# Patient Record
Sex: FEMALE | Race: WHITE | NOT HISPANIC OR LATINO | Employment: OTHER
[De-identification: names, ages, dates, MRNs, and addresses within clinical notes are randomized per-mention and may not be internally consistent; named-entity substitution may affect disease eponyms.]

---

## 2017-06-24 ENCOUNTER — HEALTH MAINTENANCE LETTER (OUTPATIENT)
Age: 57
End: 2017-06-24

## 2018-10-11 ENCOUNTER — OFFICE VISIT (OUTPATIENT)
Dept: FAMILY MEDICINE | Facility: CLINIC | Age: 58
End: 2018-10-11
Payer: COMMERCIAL

## 2018-10-11 ENCOUNTER — RADIANT APPOINTMENT (OUTPATIENT)
Dept: GENERAL RADIOLOGY | Facility: CLINIC | Age: 58
End: 2018-10-11
Attending: INTERNAL MEDICINE
Payer: COMMERCIAL

## 2018-10-11 VITALS
WEIGHT: 227 LBS | HEART RATE: 98 BPM | RESPIRATION RATE: 16 BRPM | OXYGEN SATURATION: 97 % | TEMPERATURE: 98.1 F | DIASTOLIC BLOOD PRESSURE: 96 MMHG | SYSTOLIC BLOOD PRESSURE: 171 MMHG | BODY MASS INDEX: 33.52 KG/M2

## 2018-10-11 DIAGNOSIS — M25.551 HIP PAIN, RIGHT: ICD-10-CM

## 2018-10-11 DIAGNOSIS — K21.9 GASTROESOPHAGEAL REFLUX DISEASE, ESOPHAGITIS PRESENCE NOT SPECIFIED: ICD-10-CM

## 2018-10-11 DIAGNOSIS — M25.551 HIP PAIN, RIGHT: Primary | ICD-10-CM

## 2018-10-11 PROCEDURE — 73502 X-RAY EXAM HIP UNI 2-3 VIEWS: CPT

## 2018-10-11 PROCEDURE — 99214 OFFICE O/P EST MOD 30 MIN: CPT | Performed by: INTERNAL MEDICINE

## 2018-10-11 NOTE — NURSING NOTE
"Chief Complaint   Patient presents with     Hip right     pain for 4-5 days     initial BP (!) 171/96 (BP Location: Right arm, Cuff Size: Adult Large)  Pulse 98  Temp 98.1  F (36.7  C) (Tympanic)  Resp 16  Wt 227 lb (103 kg)  LMP 08/05/2012  SpO2 97%  BMI 33.52 kg/m2 Estimated body mass index is 33.52 kg/(m^2) as calculated from the following:    Height as of 10/13/15: 5' 9\" (1.753 m).    Weight as of this encounter: 227 lb (103 kg).  BP completed using cuff size: large.    R arm      Health Maintenance that is potentially due pending provider review:  NONE    n/a    Clif Caballero ma  "

## 2018-10-11 NOTE — MR AVS SNAPSHOT
After Visit Summary   10/11/2018    Vandana Gonzalez    MRN: [id number]           Patient Information     Date Of Birth          1960        Visit Information        Provider Department      10/11/2018 3:20 PM Korin Abbasi MD [facility]        Today's Diagnoses     Hip pain, right    -  1    Gastroesophageal reflux disease, esophagitis presence not specified           Follow-ups after your visit        Additional Services     ORTHO  REFERRAL       [facility] is referring you to the Orthopedic  Services at [facility].       The  Representative will assist you in the coordination of your Orthopedic and Musculoskeletal Care as prescribed by your physician.    The  Representative will call you within 1 business day to help schedule your appointment, or you may contact the  Representative at:    All areas ~ [phone]     Type of Referral : Surgical / Specialist       Timeframe requested: 1 - 2 days    Coverage of these services is subject to the terms and limitations of your health insurance plan.  Please call member services at your health plan with any benefit or coverage questions.      If X-rays, CT or MRI's have been performed, please contact the facility where they were done to arrange for , prior to your scheduled appointment.  Please bring this referral request to your appointment and present it to your specialist.                  Your next 10 appointments already scheduled     Oct 11, 2018  3:40 PM CDT   XR HIP RIGHT G/E 2 VIEWS with CSXR1   [facility] ([facility])    [address] [address] [phone]           How do I prepare for my exam? (Food and drink instructions) No Food and Drink Restrictions.  How do I prepare for my exam? (Other instructions) You do not need to do anything special for this exam.  What should I wear: Wear  comfortable clothes.  How long does the exam take: Most scans take less than 5 minutes.  What should I bring: Bring a list of your medicines, including vitamins, minerals and over-the-counter drugs. It is safest to leave personal items at home.  Do I need a :  No  is needed.  What do I need to tell my doctor: Tell your doctor if there s any chance you are pregnant.  What should I do after the exam: No restrictions, You may resume normal activities.  What is this test: An image of a specific body part shown in shades of black and white.  Who should I call with questions: If you have any questions, please call the Imaging Department where you will have your exam. Directions, parking instructions, and other information is available on our website, [email or web address].            Oct 12, 2018  9:20 AM CDT   New Visit with Krzysztof Masterson MD   [facility] ORTHOPEDIC SURGERY ([facility])    [address]  [address]  [place] [address]   [phone]              Who to contact     If you have questions or need follow up information about today's clinic visit or your schedule please contact [facility] directly at [phone].  Normal or non-critical lab and imaging results will be communicated to you by MyChart, letter or phone within 4 business days after the clinic has received the results. If you do not hear from us within 7 days, please contact the clinic through [facility] or phone. If you have a critical or abnormal lab result, we will notify you by phone as soon as possible.  Submit refill requests through [facility] or call your pharmacy and they will forward the refill request to us. Please allow 3 business days for your refill to be completed.          Additional Information About Your Visit        [facility] Information     [facility] gives you secure access to your electronic health record. If you see a primary care provider, you can also send messages to  your care team and make appointments. If you have questions, please call your primary care clinic.  If you do not have a primary care provider, please call [phone] and they will assist you.        Care EveryWhere ID     This is your Care EveryWhere ID. This could be used by other organizations to access your [facility] medical records  [id number]        Your Vitals Were     Pulse Temperature Respirations Last Period Pulse Oximetry BMI (Body Mass Index)    98 98.1  F (36.7  C) (Tympanic) 16 08/05/2012 97% 33.52 kg/m2       Blood Pressure from Last 3 Encounters:   10/11/18 (!) 171/96   10/13/15 166/81   07/30/13 138/81    Weight from Last 3 Encounters:   10/11/18 227 lb (103 kg)   10/13/15 225 lb 9.6 oz (102.3 kg)   07/30/13 223 lb 12.8 oz (101.5 kg)              We Performed the Following     ORTHO  REFERRAL          Today's Medication Changes          These changes are accurate as of 10/11/18  3:39 PM.  If you have any questions, ask your nurse or doctor.               These medicines have changed or have updated prescriptions.        Dose/Directions    priLOSEC 20 MG CR capsule   This may have changed:  how much to take   Used for:  Gastroesophageal reflux disease, esophagitis presence not specified   Generic drug:  omeprazole   Changed by:  Korin Abbasi MD        Dose:  20 mg   Take 1 capsule (20 mg) by mouth 2 times daily   Quantity:  30 capsule   Refills:  0                Primary Care Provider Fax #    Physician No Ref-Primary [phone]       No address on file        Equal Access to Services     [facility]: Hadlyndsey webster Soryan, waaxda luqadaha, qaybta kaalmada jose alberto, debbie leon. So [facility] [phone].    ATENCIÓN: Si habla español, tiene a wheatley disposición servicios gratuitos de asistencia lingüística. Llame al [phone].    We comply with applicable federal civil rights laws and [place] laws. We do not discriminate on the basis of race,  color, national origin, age, disability, sex, sexual orientation, or gender identity.            Thank you!     Thank you for choosing [facility]  for your care. Our goal is always to provide you with excellent care. Hearing back from our patients is one way we can continue to improve our services. Please take a few minutes to complete the written survey that you may receive in the mail after your visit with us. Thank you!             Your Updated Medication List - Protect others around you: Learn how to safely use, store and throw away your medicines at www.disposemymeds.org.          This list is accurate as of 10/11/18  3:39 PM.  Always use your most recent med list.                   Brand Name Dispense Instructions for use Diagnosis    aspirin 81 MG tablet     0    1 tab Once per day        priLOSEC 20 MG CR capsule   Generic drug:  omeprazole     30 capsule    Take 1 capsule (20 mg) by mouth 2 times daily    Gastroesophageal reflux disease, esophagitis presence not specified

## 2018-10-11 NOTE — PROGRESS NOTES
SUBJECTIVE:   Vandana Gonzalez is a 58 year old female who presents to clinic today for the following health issues:      Musculoskeletal problem/pain      Duration: 4-5 days    Description  Location: right hip    Intensity:  mild    Accompanying signs and symptoms: none    History  Previous similar problem: no   Previous evaluation:  none    Precipitating or alleviating factors:  Trauma or overuse: no   Aggravating factors include: weight bearing and walking    Therapies tried and outcome: stretching          Current Medications:     Current Outpatient Prescriptions   Medication Sig Dispense Refill     ASPIRIN 81 MG OR TABS 1 tab Once per day 0 0     omeprazole (PRILOSEC) 20 MG CR capsule Take 1 capsule (20 mg) by mouth 2 times daily 30 capsule          Allergies:      Allergies   Allergen Reactions     No Known Allergies             Past Medical History:     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, unspecified site     large angioma adomen     Klippel Trenaunay disease 2002    AVM left leg     Phlebitis and thrombophlebitis of other deep vessels of lower extremities 6-2002    AVM left leg     Stasis ulcer of lower extremity (H) 2012    Left-recurrent         Past Surgical History:     Past Surgical History:   Procedure Laterality Date     BIOPSY OF BREAST, INCISIONAL  7-09    phyllodes tumor left     C LIGATN FEMORAL VEIN      multiple vein strippings left     FEMUR SURGERY      2002 left side      [facility] UGI ENDOSCOPY, SIMPLE EXAM  4-2002    esophageal stricture         Family Medical History:     Family History   Problem Relation Age of Onset     Cancer - colorectal Father          Social History:     Social History     Social History     Marital status:      Spouse name: N/A     Number of children: 0     Years of education: N/A     Occupational History     book-keeping      Social History  Main Topics     Smoking status: Former Smoker     Smokeless tobacco: Never Used      Comment: Quit 1994     Alcohol use 0.0 oz/week     0 Standard drinks or equivalent per week      Comment: couple beers per day     Drug use: No     Sexual activity: Yes     Partners: Female     Other Topics Concern     Not on file     Social History Narrative           Review of System:     Constitutional: Negative for fever or chills  Skin: Negative for rashes  Ears/Nose/Throat: Negative for nasal congestion, sore throat  Respiratory: No shortness of breath, dyspnea on exertion, cough, or hemoptysis  Cardiovascular: Negative for chest pain  Gastrointestinal: Negative for nausea, vomiting  Genitourinary: Negative for dysuria, hematuria  Musculoskeletal: Positive for right hip pains  Neurologic: Negative for headaches  Psychiatric: Negative for depression, anxiety  Hematologic/Lymphatic/Immunologic: Negative  Endocrine: Negative  Behavioral: Negative for tobacco use       Physical Exam:   BP (!) 171/96 (BP Location: Right arm, Cuff Size: Adult Large)  Pulse 98  Temp 98.1  F (36.7  C) (Tympanic)  Resp 16  Wt 227 lb (103 kg)  LMP 08/05/2012  SpO2 97%  BMI 33.52 kg/m2    GENERAL: alert and no distress  EYES: eyes grossly normal to inspection, and conjunctivae and sclerae normal  HENT: Normocephalic atraumatic. Nose and mouth without ulcers or lesions  NECK: supple  RESP: lungs clear to auscultation   CV: regular rate and rhythm, normal S1 S2  LYMPH: no peripheral edema   ABDOMEN: nondistended  MS: right hip pains noted  SKIN: no suspicious lesions or rashes  NEURO: Alert & Oriented x 3.   PSYCH: mentation appears normal, affect normal        Diagnostic Test Results:     Diagnostic Test Results:  Results for orders placed or performed during the hospital encounter of 10/13/15   US Venous Lower Extremity Rt    Narrative    ULTRASOUND RIGHT LOWER EXTREMITY VENOUS DUPLEX  10/13/2015 11:13 AM     HISTORY:  Right leg pain.    FINDINGS:  Duplex ultrasound with waveform spectral analysis and color  flow imaging was performed.    There is normal compressibility, phasicity, augmentation, and patency  of the right common femoral, superficial femoral, popliteal, and  visualized posterior tibial veins, as well as the greater saphenous  vein, with no thrombus seen.    No evidence of popliteal cyst.      Impression    IMPRESSION: No evidence of deep vein thrombosis or greater saphenous  vein thrombosis in the right leg. No evidence of popliteal cyst.    VERITO DODD MD       ASSESSMENT/PLAN:       (M25.551) Hip pain, right  (primary encounter diagnosis)  Comment: poorly controlled chronic right hip pains  Plan: ORTHO  REFERRAL, XR Hip Right 2-3 Views      (K21.9) Esophageal reflux  Comment: patient is due for a refill of Omeprazole medication for GERD treatment  Plan: omeprazole (PRILOSEC) 20 MG CR capsule        Follow Up Plan:     Patient is instructed to return to Internal Medicine clinic for follow-up visit in 1 week.        Korin Abbasi MD  Internal Medicine  [facility]

## 2018-10-12 ENCOUNTER — OFFICE VISIT (OUTPATIENT)
Dept: ORTHOPEDICS | Facility: CLINIC | Age: 58
End: 2018-10-12
Payer: COMMERCIAL

## 2018-10-12 VITALS — WEIGHT: 227 LBS | BODY MASS INDEX: 33.52 KG/M2 | SYSTOLIC BLOOD PRESSURE: 178 MMHG | DIASTOLIC BLOOD PRESSURE: 84 MMHG

## 2018-10-12 DIAGNOSIS — M25.551 PAIN OF RIGHT HIP JOINT: Primary | ICD-10-CM

## 2018-10-12 PROCEDURE — 99243 OFF/OP CNSLTJ NEW/EST LOW 30: CPT | Performed by: ORTHOPAEDIC SURGERY

## 2018-10-12 NOTE — PROGRESS NOTES
HISTORY OF PRESENT ILLNESS:    Vandana Gonzalez is a 58 year old female who is seen in consultation at the request of Dr. Abbasi for right hip pain. Patient reports right hip pain that started 10/7/18. Patient reports no injury or trauma to the hip.  Patient noted increased pain on Monday, 10/8/18 while moving from sit to stand.     Present symptoms: raghavendra lateral hip and thigh pain. Patient notes sharp pain with walking and weight bearing. She denies pain while seated and at rest. Patient denies swelling and bruising of the area.   Patient denies weakness of the right hip. No catching, clicking, and popping of the hip.  Current pain level while walking 8/10.   Treatments tried to this point: Ice pack  Orthopedic PMH: left femoral neck fracture.     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, unspecified site     large angioma adomen     Klippel Trenaunay disease 2002    AVM left leg     Phlebitis and thrombophlebitis of other deep vessels of lower extremities 6-2002    AVM left leg     Stasis ulcer of lower extremity (H) 2012    Left-recurrent       Past Surgical History:   Procedure Laterality Date     BIOPSY OF BREAST, INCISIONAL  7-09    phyllodes tumor left     C LIGATN FEMORAL VEIN      multiple vein strippings left     FEMUR SURGERY      2002 left side      [facility] UGI ENDOSCOPY, SIMPLE EXAM  4-2002    esophageal stricture       Family History   Problem Relation Age of Onset     Cancer - colorectal Father        Social History     Social History     Marital status:      Spouse name: N/A     Number of children: 0     Years of education: N/A     Occupational History     book-keeping      Social History Main Topics     Smoking status: Former Smoker     Smokeless tobacco: Never Used      Comment: Quit 1994     Alcohol use 0.0 oz/week     0 Standard drinks or equivalent per week      Comment: couple  beers per day     Drug use: No     Sexual activity: Yes     Partners: Female     Other Topics Concern     Not on file     Social History Narrative       Current Outpatient Prescriptions   Medication Sig Dispense Refill     ASPIRIN 81 MG OR TABS 1 tab Once per day 0 0     omeprazole (PRILOSEC) 20 MG CR capsule Take 1 capsule (20 mg) by mouth 2 times daily 30 capsule        Allergies   Allergen Reactions     No Known Allergies        REVIEW OF SYSTEMS:  CONSTITUTIONAL:  NEGATIVE for fever, chills, change in weight  INTEGUMENTARY/SKIN:  NEGATIVE for worrisome rashes, moles or lesions  EYES:  NEGATIVE for vision changes or irritation  ENT/MOUTH:  NEGATIVE for ear, mouth and throat problems  RESP:  NEGATIVE for significant cough or SOB  BREAST:  NEGATIVE for masses, tenderness or discharge  CV:  NEGATIVE for chest pain, palpitations or peripheral edema  GI:  NEGATIVE for nausea, abdominal pain, heartburn, or change in bowel habits  :  Negative   MUSCULOSKELETAL:  See HPI above  NEURO:  NEGATIVE for weakness, dizziness or paresthesias  ENDOCRINE:  NEGATIVE for temperature intolerance, skin/hair changes  HEME/ALLERGY/IMMUNE:  NEGATIVE for bleeding problems  PSYCHIATRIC:  NEGATIVE for changes in mood or affect      PHYSICAL EXAM:  [facility] 08/05/2012  There is no height or weight on file to calculate BMI.   GENERAL APPEARANCE: healthy, alert and no distress   SKIN: no suspicious lesions or rashes  NEURO: Normal strength and tone, mentation intact and speech normal  VASCULAR: Good pulses, and capillary refill   LYMPH: no lymphadenopathy   PSYCH:  mentation appears normal and affect normal/bright    MSK:  A&OX3, NAD  Neck supple, no lymphadenopathy  The patient ambulates without an antalgic gait.  The patient is able to get on and off the exam table without difficulty.    Examination of the spine reveals a normal lordosis to the cervical and lumbar spine, and a normal kyphosis to the thoracic spine.  There is no clinical  evidence of scoliosis.    The pelvis is clinically level.  Trendelenberg is negative.  The patient is nontender to palpation over the greater trochanteric bursal region or piriformis fossa.  With the hip flexed to 90 degrees, internal and external rotation is 30/60 respectively,  with no pain.  The calves and thighs are symmetric, without atrophy and non-tender to palpation. Rima's sign is negative, bilaterally.   CMS is intact to the toes.      ASSESSMENT / PLAN: Acute onset of right hip and groin pain.  I ordered an MRI of the hip to look for pathology such as avascular necrosis, or labral pathology.    Imaging Interpretation:     Recent Results (from the past 744 hour(s))   XR Hip Right 2-3 Views    Narrative    XR HIP RIGHT 2-3 VIEWS 10/11/2018 3:46 PM    HISTORY: Pain.    COMPARISON: None.      Impression    IMPRESSION: No evidence of acute fracture or malalignment.     MD Tal RIDER MD  Department of Orthopedic Surgery

## 2018-10-12 NOTE — LETTER
10/12/2018         RE: Vandana Gonzalez  [address] [address]        Dear Colleague,    Thank you for referring your patient, Vandana Gonzalez, to the [facility] ORTHOPEDIC SURGERY. Please see a copy of my visit note below.    HISTORY OF PRESENT ILLNESS:    Vandana Gonzalez is a 58 year old female who is seen in consultation at the request of Dr. Abbasi for right hip pain. Patient reports right hip pain that started 10/7/18. Patient reports no injury or trauma to the hip.  Patient noted increased pain on Monday, 10/8/18 while moving from sit to stand.     Present symptoms: raghavendra lateral hip and thigh pain. Patient notes sharp pain with walking and weight bearing. She denies pain while seated and at rest. Patient denies swelling and bruising of the area.   Patient denies weakness of the right hip. No catching, clicking, and popping of the hip.  Current pain level while walking 8/10.   Treatments tried to this point: Ice pack  Orthopedic PMH: left femoral neck fracture.     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, unspecified site     large angioma adomen     Klippel Trenaunay disease 2002    AVM left leg     Phlebitis and thrombophlebitis of other deep vessels of lower extremities 6-2002    AVM left leg     Stasis ulcer of lower extremity (H) 2012    Left-recurrent       Past Surgical History:   Procedure Laterality Date     BIOPSY OF BREAST, INCISIONAL  7-09    phyllodes tumor left     C LIGATN FEMORAL VEIN      multiple vein strippings left     FEMUR SURGERY      2002 left side      [facility] UGI ENDOSCOPY, SIMPLE EXAM  4-2002    esophageal stricture       Family History   Problem Relation Age of Onset     Cancer - colorectal Father        Social History     Social History     Marital status:      Spouse name: N/A     Number of children: 0     Years of education: N/A      Occupational History     book-keeping      Social History Main Topics     Smoking status: Former Smoker     Smokeless tobacco: Never Used      Comment: Quit 1994     Alcohol use 0.0 oz/week     0 Standard drinks or equivalent per week      Comment: couple beers per day     Drug use: No     Sexual activity: Yes     Partners: Female     Other Topics Concern     Not on file     Social History Narrative       Current Outpatient Prescriptions   Medication Sig Dispense Refill     ASPIRIN 81 MG OR TABS 1 tab Once per day 0 0     omeprazole (PRILOSEC) 20 MG CR capsule Take 1 capsule (20 mg) by mouth 2 times daily 30 capsule        Allergies   Allergen Reactions     No Known Allergies        REVIEW OF SYSTEMS:  CONSTITUTIONAL:  NEGATIVE for fever, chills, change in weight  INTEGUMENTARY/SKIN:  NEGATIVE for worrisome rashes, moles or lesions  EYES:  NEGATIVE for vision changes or irritation  ENT/MOUTH:  NEGATIVE for ear, mouth and throat problems  RESP:  NEGATIVE for significant cough or SOB  BREAST:  NEGATIVE for masses, tenderness or discharge  CV:  NEGATIVE for chest pain, palpitations or peripheral edema  GI:  NEGATIVE for nausea, abdominal pain, heartburn, or change in bowel habits  :  Negative   MUSCULOSKELETAL:  See HPI above  NEURO:  NEGATIVE for weakness, dizziness or paresthesias  ENDOCRINE:  NEGATIVE for temperature intolerance, skin/hair changes  HEME/ALLERGY/IMMUNE:  NEGATIVE for bleeding problems  PSYCHIATRIC:  NEGATIVE for changes in mood or affect      PHYSICAL EXAM:  LMP 08/05/2012  There is no height or weight on file to calculate BMI.   GENERAL APPEARANCE: healthy, alert and no distress   SKIN: no suspicious lesions or rashes  NEURO: Normal strength and tone, mentation intact and speech normal  VASCULAR: Good pulses, and capillary refill   LYMPH: no lymphadenopathy   PSYCH:  mentation appears normal and affect normal/bright    MSK:  A&OX3, NAD  Neck supple, no lymphadenopathy  The patient ambulates  without an antalgic gait.  The patient is able to get on and off the exam table without difficulty.    Examination of the spine reveals a normal lordosis to the cervical and lumbar spine, and a normal kyphosis to the thoracic spine.  There is no clinical evidence of scoliosis.    The pelvis is clinically level.  Trendelenberg is negative.  The patient is nontender to palpation over the greater trochanteric bursal region or piriformis fossa.  With the hip flexed to 90 degrees, internal and external rotation is 30/60 respectively,  with no pain.  The calves and thighs are symmetric, without atrophy and non-tender to palpation. Rima's sign is negative, bilaterally.   CMS is intact to the toes.      ASSESSMENT / PLAN: Acute onset of right hip and groin pain.  I ordered an MRI of the hip to look for pathology such as avascular necrosis, or labral pathology.    Imaging Interpretation:     Recent Results (from the past 744 hour(s))   XR Hip Right 2-3 Views    Narrative    XR HIP RIGHT 2-3 VIEWS 10/11/2018 3:46 PM    HISTORY: Pain.    COMPARISON: None.      Impression    IMPRESSION: No evidence of acute fracture or malalignment.     MD Tal RIDER MD  Department of Orthopedic Surgery          Again, thank you for allowing me to participate in the care of your patient.        Sincerely,        Krzysztof Masterson MD

## 2018-10-12 NOTE — MR AVS SNAPSHOT
After Visit Summary   10/12/2018    Vandana Gonzalez    MRN: [id number]           Patient Information     Date Of Birth          1960        Visit Information        Provider Department      10/12/2018 9:20 AM Krzysztof Masterson MD [facility] ORTHOPEDIC SURGERY        Today's Diagnoses     Pain of right hip joint    -  1      Care Instructions    Please call  [phone] to schedule your appointment if you have not heard from them in two business days  If you need to cancel or change the appointment please call [phone]     Please follow up in clinic 2 business days following the MRI / MRI Arthrogram of right hip.          Follow-ups after your visit        Future tests that were ordered for you today     Open Future Orders        Priority Expected Expires Ordered    MR Hip Right w Contrast Routine  10/12/2019 10/12/2018    XR Gadolinium Injection Routine 10/12/2018 10/12/2019 10/12/2018            Who to contact     If you have questions or need follow up information about today's clinic visit or your schedule please contact [facility] ORTHOPEDIC SURGERY directly at [phone].  Normal or non-critical lab and imaging results will be communicated to you by [employer], letter or phone within 4 business days after the clinic has received the results. If you do not hear from us within 7 days, please contact the clinic through [employer] or phone. If you have a critical or abnormal lab result, we will notify you by phone as soon as possible.  Submit refill requests through [employer] or call your pharmacy and they will forward the refill request to us. Please allow 3 business days for your refill to be completed.          Additional Information About Your Visit        [employer] Information     [employer] gives you secure access to your electronic health record. If you see a primary care provider, you can also send messages to your care team and make appointments. If you have questions, please call  your primary care clinic.  If you do not have a primary care provider, please call [phone] and they will assist you.        Care EveryWhere ID     This is your Care EveryWhere ID. This could be used by other organizations to access your [place] medical records  [id number]        Your Vitals Were     Last Period BMI (Body Mass Index)                08/05/2012 33.52 kg/m2           Blood Pressure from Last 3 Encounters:   10/12/18 178/84   10/11/18 (!) 171/96   10/13/15 166/81    Weight from Last 3 Encounters:   10/12/18 227 lb (103 kg)   10/11/18 227 lb (103 kg)   10/13/15 225 lb 9.6 oz (102.3 kg)               Primary Care Provider Fax #    Physician No Ref-Primary [phone]       No address on file        Equal Access to Services     JC PIERRE : Shola Pemberton, waaxstacey cameron, max olivasaltomasz abrams, debbie neff . So [facility] [phone].    ATENCIÓN: Si habla español, tiene a wheatley disposición servicios gratuitos de asistencia lingüística. Pedrito al [phone].    We comply with applicable federal civil rights laws and [place] laws. We do not discriminate on the basis of race, color, national origin, age, disability, sex, sexual orientation, or gender identity.            Thank you!     Thank you for choosing [facility] ORTHOPEDIC SURGERY  for your care. Our goal is always to provide you with excellent care. Hearing back from our patients is one way we can continue to improve our services. Please take a few minutes to complete the written survey that you may receive in the mail after your visit with us. Thank you!             Your Updated Medication List - Protect others around you: Learn how to safely use, store and throw away your medicines at www.disposemymeds.org.          This list is accurate as of 10/12/18  9:46 AM.  Always use your most recent med list.                   Brand Name Dispense Instructions for use Diagnosis    aspirin 81 MG  tablet     0    1 tab Once per day        priLOSEC 20 MG CR capsule   Generic drug:  omeprazole     30 capsule    Take 1 capsule (20 mg) by mouth 2 times daily    Gastroesophageal reflux disease, esophagitis presence not specified

## 2018-10-12 NOTE — PATIENT INSTRUCTIONS
Please call  [phone] to schedule your appointment if you have not heard from them in two business days  If you need to cancel or change the appointment please call [phone]     Please follow up in clinic 2 business days following the MRI / MRI Arthrogram of right hip.

## 2018-10-22 ENCOUNTER — HOSPITAL ENCOUNTER (OUTPATIENT)
Dept: MRI IMAGING | Facility: CLINIC | Age: 58
End: 2018-10-22
Attending: ORTHOPAEDIC SURGERY
Payer: COMMERCIAL

## 2018-10-22 ENCOUNTER — HOSPITAL ENCOUNTER (OUTPATIENT)
Dept: GENERAL RADIOLOGY | Facility: CLINIC | Age: 58
Discharge: HOME | End: 2018-10-22
Attending: ORTHOPAEDIC SURGERY | Admitting: ORTHOPAEDIC SURGERY
Payer: COMMERCIAL

## 2018-10-22 VITALS — HEART RATE: 86 BPM | DIASTOLIC BLOOD PRESSURE: 94 MMHG | SYSTOLIC BLOOD PRESSURE: 164 MMHG | OXYGEN SATURATION: 100 %

## 2018-10-22 DIAGNOSIS — M25.551 PAIN OF RIGHT HIP JOINT: ICD-10-CM

## 2018-10-22 PROCEDURE — 27211111 XR GADOLINIUM INJECTION

## 2018-10-22 PROCEDURE — 25000125 ZZHC RX 250: Performed by: PHYSICIAN ASSISTANT

## 2018-10-22 PROCEDURE — A9585 GADOBUTROL INJECTION: HCPCS | Performed by: PHYSICIAN ASSISTANT

## 2018-10-22 PROCEDURE — 73722 MRI JOINT OF LWR EXTR W/DYE: CPT | Mod: RT

## 2018-10-22 PROCEDURE — 25000128 H RX IP 250 OP 636: Performed by: PHYSICIAN ASSISTANT

## 2018-10-22 PROCEDURE — 25500064 ZZH RX 255 OP 636: Performed by: PHYSICIAN ASSISTANT

## 2018-10-22 RX ORDER — GADOBUTROL 604.72 MG/ML
0.1 INJECTION INTRAVENOUS ONCE
Status: COMPLETED | OUTPATIENT
Start: 2018-10-22 | End: 2018-10-22

## 2018-10-22 RX ORDER — EPINEPHRINE 1 MG/ML
1 INJECTION, SOLUTION, CONCENTRATE INTRAVENOUS ONCE
Status: COMPLETED | OUTPATIENT
Start: 2018-10-22 | End: 2018-10-22

## 2018-10-22 RX ORDER — IOPAMIDOL 408 MG/ML
10 INJECTION, SOLUTION INTRATHECAL ONCE
Status: COMPLETED | OUTPATIENT
Start: 2018-10-22 | End: 2018-10-22

## 2018-10-22 RX ADMIN — EPINEPHRINE 0.1 MG: 1 INJECTION, SOLUTION, CONCENTRATE INTRAVENOUS at 09:43

## 2018-10-22 RX ADMIN — GADOBUTROL 0.1 ML: 604.72 INJECTION INTRAVENOUS at 09:42

## 2018-10-22 RX ADMIN — IOPAMIDOL 3 ML: 408 INJECTION, SOLUTION INTRATHECAL at 09:43

## 2018-10-22 RX ADMIN — LIDOCAINE HYDROCHLORIDE 3 ML: 10 INJECTION, SOLUTION EPIDURAL; INFILTRATION; INTRACAUDAL; PERINEURAL at 09:41

## 2018-10-22 NOTE — DISCHARGE INSTRUCTIONS
Orthopedic Discharge Instructions:   After Your Injection or Aspiration  ________________________________________    Patient Name:  Vandana Gonzalez  Today's Date:  October 22, 2018  The doctor who performed your INJECTION PROCEDURE was Herve TUBBS in the RADIOLOGY Department  Care of needle site    If you have new numbness down your leg, this may last up to 6 hours, but it should go away. You may need help with walking until your leg feels normal.     Over the next 24 to 48 hours, pain at the needle site may increase before it gets better.     For the next 48 hours, use ice packs for 15 minutes, three to four times a day for pain.    If you have a bandage, you may remove it the next morning.     No tub baths, hot tubs or swimming for 48 hours. You may shower the next day.   Activity    Do not drive until morning.     Limit your activity based on your pain level. Follow your doctor s orders for activity.     You may eat a normal diet.     If you had sedation,   - You may feel sleepy, forgetful or unsteady.   - Do not drink alcohol for 24 hours.  Medicines    If you take aspirin or platelet inhibitors, you can restart them tomorrow.     Restart all other medicines today at your regular dose, including Coumadin (warfarin).    If you are restarting Coumadin, talk to your doctor about having your INR checked.   If you had a steroid shot     The medicine should help reduce swelling and pain. This may take from 7 to 10 days.     Side effects from the shot will be mild and go away in 2 to 3 days. Common side effects may include:  -  Insomnia (trouble sleeping).  -  Heartburn.  -  Flushed face.  -  Water retention (bloating or fluid build-up).  -  Increased appetite (feeling more hungry than usual).  -  Increased blood sugar.  If you have diabetes, watch your blood sugar closely. If needed, call your doctor to help you control your blood sugar.  Some patients will get lasting relief from a single shot. Others may require  up to three shots to get results. If you have more than one steroid shot, they should be given two weeks apart.  Some patients do not have relief of symptoms.    Follow-up:  NO FOLLOW-UP NEEDED   Call your doctor or go to the Emergency Room if you have severe pain, fever or problems with bowel or bladder control.

## 2018-10-22 NOTE — IP AVS SNAPSHOT
[facility] Radiology    [address] [address]    Phone:  [phone]                                       After Visit Summary   10/22/2018    Vandana Gonzalez    MRN: [id number]           After Visit Summary Signature Page     I have received my discharge instructions, and my questions have been answered. I have discussed any challenges I see with this plan with the nurse or doctor.    ..........................................................................................................................................  Patient/Patient Representative Signature      ..........................................................................................................................................  Patient Representative Print Name and Relationship to Patient    ..................................................               ................................................  Date                                   Time    ..........................................................................................................................................  Reviewed by Signature/Title    ...................................................              ..............................................  Date                                               Time          22EPIC Rev 08/18

## 2018-10-22 NOTE — IP AVS SNAPSHOT
MRN:[id number]                      After Visit Summary   10/22/2018    Vandana Gonzalez    MRN: [id number]           Visit Information        Provider Department      10/22/2018  9:00 AM SHXR4 [facility] Radiology           Review of your medicines      UNREVIEWED medicines. Ask your doctor about these medicines        Dose / Directions    aspirin 81 MG tablet        1 tab Once per day   Quantity:  0   Refills:  0       priLOSEC 20 MG CR capsule   Used for:  Gastroesophageal reflux disease, esophagitis presence not specified   Generic drug:  omeprazole        Dose:  20 mg   Take 1 capsule (20 mg) by mouth 2 times daily   Quantity:  30 capsule   Refills:  0                Protect others around you: Learn how to safely use, store and throw away your medicines at www.disposemymeds.org.         Follow-ups after your visit        Your next 10 appointments already scheduled     Oct 22, 2018 10:00 AM CDT   MR HIP RIGHT W CONTRAST with SHMRP2   [facility] MRI ([facility])    [address] [place] [id number] [phone]           How do I prepare for my exam? (Food and drink instructions) **If you will be receiving sedation or general anesthesia, please see special notes below.**  How do I prepare for my exam? (Other instructions) Take your medicines as usual, unless your doctor tells you not to. You may or may not receive intravenous (IV) contrast for this exam pending the discretion of the Radiologist.  You do not need to do anything special to prepare.  **If you will be receiving sedation or general anesthesia, please see special notes below.**  What should I wear: The MRI machine uses a strong magnet. Please wear clothes without metal (snaps, zippers). A sweatsuit works well, or we may give you a hospital gown. Please remove any body piercings and hair extensions before you arrive. You will also remove watches, jewelry, hairpins, wallets, dentures, partial  dental plates and hearing aids. You may wear contact lenses, and you may be able to wear your rings. We have a safe place to keep your personal items, but it is safer to leave them at home.  How long does the exam take: Most tests take 30 to 60 minutes.  HOWEVER, IF YOUR DOCTOR PRESCRIBES ANESTHESIA please plan on spending four to five hours in the recovery room.  What should I bring:  Bring a list of your current medicines to your exam (including vitamins, minerals and over-the-counter drugs).  Do I need a :  **If you will be receiving sedation or general anesthesia, please see special notes below.**  What should I do after the exam: No Restrictions, You may resume normal activities.  What is this test: MRI (magnetic resonance imaging) uses a strong magnet and radio waves to look inside the body. An MRA (magnetic resonance angiogram) does the same thing, but it lets us look at your blood vessels. A computer turns the radio waves into pictures showing cross sections of the body, much like slices of bread. This helps us see any problems more clearly. You may receive fluid (called  contrast ) before or during your scan. The fluid helps us see the pictures better. We give the fluid through an IV (small needle in your arm).  Who should I call with questions:  Please call the Imaging Department at your exam site with any questions. Directions, parking instructions, and other information is available on our website, [email or web address].  How do I prepare if I m having sedation or anesthesia? **IMPORTANT** THE INSTRUCTIONS BELOW ARE ONLY FOR THOSE PATIENTS WHO HAVE BEEN TOLD THEY WILL RECEIVE SEDATION OR GENERAL ANESTHESIA DURING THEIR MRI PROCEDURE:  IF YOU WILL RECEIVE SEDATION (take medicine to help you relax during your exam): You must get the medicine from your doctor before you arrive. Bring the medicine to the exam. Do not take it at home. Arrive one hour early. Bring someone who can take you home after the  test. Your medicine will make you sleepy. After the exam, you may not drive, take a bus or take a taxi by yourself. No eating 8 hours before your exam. You may have clear liquids up until 4 hours before your exam. (Clear liquids include water, clear tea, black coffee and fruit juice without pulp.)  IF YOU WILL RECEIVE ANESTHESIA (be asleep for your exam): Arrive 1 1/2 hours early. Bring someone who can take you home after the test. You may not drive, take a bus or take a taxi by yourself. No eating 8 hours before your exam. You may have clear liquids up until 4 hours before your exam. (Clear liquids include water, clear tea, black coffee and fruit juice without pulp.)               Care Instructions        Further instructions from your care team         Orthopedic Discharge Instructions:   After Your Injection or Aspiration  ________________________________________    Patient Name:  Vandana Gonzalez  Today's Date:  October 22, 2018  The doctor who performed your INJECTION PROCEDURE was Herve TUBBS in the RADIOLOGY Department  Care of needle site    If you have new numbness down your leg, this may last up to 6 hours, but it should go away. You may need help with walking until your leg feels normal.     Over the next 24 to 48 hours, pain at the needle site may increase before it gets better.     For the next 48 hours, use ice packs for 15 minutes, three to four times a day for pain.    If you have a bandage, you may remove it the next morning.     No tub baths, hot tubs or swimming for 48 hours. You may shower the next day.   Activity    Do not drive until morning.     Limit your activity based on your pain level. Follow your doctor s orders for activity.     You may eat a normal diet.     If you had sedation,   - You may feel sleepy, forgetful or unsteady.   - Do not drink alcohol for 24 hours.  Medicines    If you take aspirin or platelet inhibitors, you can restart them tomorrow.     Restart all other medicines  today at your regular dose, including Coumadin (warfarin).    If you are restarting Coumadin, talk to your doctor about having your INR checked.   If you had a steroid shot     The medicine should help reduce swelling and pain. This may take from 7 to 10 days.     Side effects from the shot will be mild and go away in 2 to 3 days. Common side effects may include:  -  Insomnia (trouble sleeping).  -  Heartburn.  -  Flushed face.  -  Water retention (bloating or fluid build-up).  -  Increased appetite (feeling more hungry than usual).  -  Increased blood sugar.  If you have diabetes, watch your blood sugar closely. If needed, call your doctor to help you control your blood sugar.  Some patients will get lasting relief from a single shot. Others may require up to three shots to get results. If you have more than one steroid shot, they should be given two weeks apart.  Some patients do not have relief of symptoms.    Follow-up:  NO FOLLOW-UP NEEDED   Call your doctor or go to the Emergency Room if you have severe pain, fever or problems with bowel or bladder control.      Additional Information About Your Visit        [employer] Information     [employer] gives you secure access to your electronic health record. If you see a primary care provider, you can also send messages to your care team and make appointments. If you have questions, please call your primary care clinic.  If you do not have a primary care provider, please call [phone] and they will assist you.        Care EveryWhere ID     This is your Care EveryWhere ID. This could be used by other organizations to access your [place] medical records  [id number]        Your Vitals Were     Blood Pressure Pulse Last Period Pulse Oximetry          164/94 (BP Location: Right arm) 86 08/05/2012 100%         Primary Care Provider Fax #    Physician No Ref-Primary [phone]      Equal Access to Services     JC PIERRE AH: lai Zelaya  max cameronmastacey barnesblakecoretta ibarragarret clareerick leon. So [facility] [phone].    ATENCIÓN: Si lashanda schwartz, tiene a wheatley disposición servicios gratuitos de asistencia lingüística. Llame al [phone].    We comply with applicable federal civil rights laws and [place] laws. We do not discriminate on the basis of race, color, national origin, age, disability, sex, sexual orientation, or gender identity.            Thank you!     Thank you for choosing [facility] for your care. Our goal is always to provide you with excellent care. Hearing back from our patients is one way we can continue to improve our services. Please take a few minutes to complete the written survey that you may receive in the mail after you visit with us. Thank you!             Medication List: This is a list of all your medications and when to take them. Check marks below indicate your daily home schedule. Keep this list as a reference.      Medications           Morning Afternoon Evening Bedtime As Needed    aspirin 81 MG tablet   1 tab Once per day                                priLOSEC 20 MG CR capsule   Take 1 capsule (20 mg) by mouth 2 times daily   Generic drug:  omeprazole

## 2018-11-02 ENCOUNTER — OFFICE VISIT (OUTPATIENT)
Dept: ORTHOPEDICS | Facility: CLINIC | Age: 58
End: 2018-11-02
Payer: COMMERCIAL

## 2018-11-02 VITALS — SYSTOLIC BLOOD PRESSURE: 160 MMHG | DIASTOLIC BLOOD PRESSURE: 96 MMHG | BODY MASS INDEX: 33.52 KG/M2 | WEIGHT: 227 LBS

## 2018-11-02 DIAGNOSIS — M25.551 PAIN OF RIGHT HIP JOINT: Primary | ICD-10-CM

## 2018-11-02 PROCEDURE — 99213 OFFICE O/P EST LOW 20 MIN: CPT | Performed by: ORTHOPAEDIC SURGERY

## 2018-11-02 NOTE — MR AVS SNAPSHOT
After Visit Summary   11/2/2018    Vandana Gonzalez    MRN: [id number]           Patient Information     Date Of Birth          1960        Visit Information        Provider Department      11/2/2018 9:00 AM Krzysztof Masterson MD [facility] ORTHOPEDIC SURGERY        Today's Diagnoses     Pain of right hip joint    -  1      Care Instructions    Patient to follow up with Primary Care provider regarding elevated blood pressure.            Follow-ups after your visit        Who to contact     If you have questions or need follow up information about today's clinic visit or your schedule please contact [facility] ORTHOPEDIC SURGERY directly at [phone].  Normal or non-critical lab and imaging results will be communicated to you by [employer], letter or phone within 4 business days after the clinic has received the results. If you do not hear from us within 7 days, please contact the clinic through [employer] or phone. If you have a critical or abnormal lab result, we will notify you by phone as soon as possible.  Submit refill requests through [employer] or call your pharmacy and they will forward the refill request to us. Please allow 3 business days for your refill to be completed.          Additional Information About Your Visit        MyChart Information     [employer] gives you secure access to your electronic health record. If you see a primary care provider, you can also send messages to your care team and make appointments. If you have questions, please call your primary care clinic.  If you do not have a primary care provider, please call [phone] and they will assist you.        Care EveryWhere ID     This is your Care EveryWhere ID. This could be used by other organizations to access your [place] medical records  [id number]        Your Vitals Were     Last Period BMI (Body Mass Index)                08/05/2012 33.52 kg/m2           Blood Pressure from Last 3 Encounters:   11/02/18  (!) 160/96   10/22/18 (!) 164/94   10/12/18 178/84    Weight from Last 3 Encounters:   11/02/18 227 lb (103 kg)   10/12/18 227 lb (103 kg)   10/11/18 227 lb (103 kg)              Today, you had the following     No orders found for display       Primary Care Provider Fax #    Physician No Ref-Primary [phone]       No address on file        Equal Access to Services     JC PIERRE : Hadii debbie flores hadasho Soomaali, waaxda luqadaha, qaybta kaalmada adeegyada, debbie pacheco leonardaaminta duncanjamesrakesh neff . So [facility] [phone].    ATENCIÓN: Si habla español, tiene a wheatley disposición servicios gratuitos de asistencia lingüística. Llame al [phone].    We comply with applicable federal civil rights laws and [place] laws. We do not discriminate on the basis of race, color, national origin, age, disability, sex, sexual orientation, or gender identity.            Thank you!     Thank you for choosing [facility] ORTHOPEDIC SURGERY  for your care. Our goal is always to provide you with excellent care. Hearing back from our patients is one way we can continue to improve our services. Please take a few minutes to complete the written survey that you may receive in the mail after your visit with us. Thank you!             Your Updated Medication List - Protect others around you: Learn how to safely use, store and throw away your medicines at www.disposemymeds.org.          This list is accurate as of 11/2/18 11:05 AM.  Always use your most recent med list.                   Brand Name Dispense Instructions for use Diagnosis    aspirin 81 MG tablet     0    1 tab Once per day        priLOSEC 20 MG CR capsule   Generic drug:  omeprazole     30 capsule    Take 1 capsule (20 mg) by mouth 2 times daily    Gastroesophageal reflux disease, esophagitis presence not specified

## 2018-11-02 NOTE — LETTER
11/2/2018         RE: Vandana Gonzalez  [address] [address]        Dear Colleague,    Thank you for referring your patient, Vandana Gonzalez, to the [facility] ORTHOPEDIC SURGERY. Please see a copy of my visit note below.    HISTORY OF PRESENT ILLNESS:    Vandana Gonzalez is a 58 year old female who is seen in follow up for right hip pain.  Present symptoms: raghavendra lateral hip and thigh pain. Patient notes sharp pain with walking and weight bearing. She denies pain while seated and at rest. Patient denies swelling and bruising of the area.   Patient denies weakness of the right hip. No catching, clicking, and popping of the hip.  Current pain level while walking 8/10.   Treatments tried to this point: ice pack    PHYSICAL EXAM:  [facility] 08/05/2012  There is no height or weight on file to calculate BMI.   GENERAL APPEARANCE: healthy, alert and no distress   SKIN: no suspicious lesions or rashes  NEURO: Normal strength and tone, mentation intact and speech normal  VASCULAR:  good pulses, and cappillary refill   LYMPH: no lymphadenopathy   PSYCH:  mentation appears normal and affect normal/bright    MSK:  The patient ambulates without an antalgic gait.  The patient is able to get on and off the exam table without difficulty.        The calves and thighs are symmetric, without atrophy and non-tender to palpation. Rima's sign is negative, bilaterally.   CMS is intact to the toes.       IMAGING INTERPRETATION:   MRI 10/22/18  Impression:     IMPRESSION: Abnormal signal at the base of the anterior superior  labrum consistent with labral tear. No acetabular cyst seen. Articular  surfaces appear intact.         ASSESSMENT / PLAN: Labral tear to the right hip.  I will refer her to 1 of my colleagues for their considerations, with regard to possible hip arthroscopy.      Tal Masterson MD  Dept. Orthopedic Surgery  [facility]         Again, thank you for allowing me to participate in the care of  your patient.        Sincerely,        Krzysztof Masterson MD

## 2018-11-02 NOTE — PROGRESS NOTES
HISTORY OF PRESENT ILLNESS:    Vandana Gonzalez is a 58 year old female who is seen in follow up for right hip pain.  Present symptoms: raghavendra lateral hip and thigh pain. Patient notes sharp pain with walking and weight bearing. She denies pain while seated and at rest. Patient denies swelling and bruising of the area.   Patient denies weakness of the right hip. No catching, clicking, and popping of the hip.  Current pain level while walking 8/10.   Treatments tried to this point: ice pack    PHYSICAL EXAM:  [facility] 08/05/2012  There is no height or weight on file to calculate BMI.   GENERAL APPEARANCE: healthy, alert and no distress   SKIN: no suspicious lesions or rashes  NEURO: Normal strength and tone, mentation intact and speech normal  VASCULAR:  good pulses, and cappillary refill   LYMPH: no lymphadenopathy   PSYCH:  mentation appears normal and affect normal/bright    MSK:  The patient ambulates without an antalgic gait.  The patient is able to get on and off the exam table without difficulty.        The calves and thighs are symmetric, without atrophy and non-tender to palpation. Rima's sign is negative, bilaterally.   CMS is intact to the toes.       IMAGING INTERPRETATION:   MRI 10/22/18  Impression:     IMPRESSION: Abnormal signal at the base of the anterior superior  labrum consistent with labral tear. No acetabular cyst seen. Articular  surfaces appear intact.         ASSESSMENT / PLAN: Labral tear to the right hip.  I will refer her to 1 of my colleagues for their considerations, with regard to possible hip arthroscopy.      Tal Masterson MD  Dept. Orthopedic Surgery  [facility]

## 2019-02-21 ENCOUNTER — TELEPHONE (OUTPATIENT)
Dept: FAMILY MEDICINE | Facility: CLINIC | Age: 59
End: 2019-02-21

## 2019-02-21 NOTE — TELEPHONE ENCOUNTER
Panel Management Review      Patient has the following on her problem list:       Composite cancer screening  Chart review shows that this patient is due/due soon for the following Pap Smear, Mammogram, Colonoscopy and Fecal Colorectal (FIT)  Summary:    Patient is due/failing the following:   COLONOSCOPY, FIT, MAMMOGRAM and PAP    Action needed:   Patient needs referral/order:     Type of outreach:    Phone, left message for patient to call back.     Questions for provider review:    None                                                                                                                                    Eryn Ireland CMA on 2/21/2019 at 12:40 PM

## 2019-12-15 ENCOUNTER — HEALTH MAINTENANCE LETTER (OUTPATIENT)
Age: 59
End: 2019-12-15

## 2020-10-20 ENCOUNTER — HOSPITAL ENCOUNTER (EMERGENCY)
Facility: CLINIC | Age: 60
Discharge: HOME | End: 2020-10-20
Attending: EMERGENCY MEDICINE | Admitting: EMERGENCY MEDICINE
Payer: COMMERCIAL

## 2020-10-20 ENCOUNTER — TELEPHONE (OUTPATIENT)
Dept: FAMILY MEDICINE | Facility: CLINIC | Age: 60
End: 2020-10-20

## 2020-10-20 VITALS
OXYGEN SATURATION: 100 % | BODY MASS INDEX: 32.49 KG/M2 | HEART RATE: 85 BPM | SYSTOLIC BLOOD PRESSURE: 177 MMHG | RESPIRATION RATE: 20 BRPM | WEIGHT: 220 LBS | DIASTOLIC BLOOD PRESSURE: 82 MMHG | TEMPERATURE: 98.4 F

## 2020-10-20 DIAGNOSIS — R13.10 DYSPHAGIA, UNSPECIFIED TYPE: ICD-10-CM

## 2020-10-20 LAB
ANION GAP SERPL CALCULATED.3IONS-SCNC: 5 MMOL/L (ref 3–14)
BUN SERPL-MCNC: 8 MG/DL (ref 7–30)
CALCIUM SERPL-MCNC: 8.9 MG/DL (ref 8.5–10.1)
CHLORIDE SERPL-SCNC: 109 MMOL/L (ref 94–109)
CO2 SERPL-SCNC: 25 MMOL/L (ref 20–32)
CREAT SERPL-MCNC: 0.45 MG/DL (ref 0.52–1.04)
GFR SERPL CREATININE-BSD FRML MDRD: >90 ML/MIN/{1.73_M2}
GLUCOSE SERPL-MCNC: 105 MG/DL (ref 70–99)
POTASSIUM SERPL-SCNC: 3.7 MMOL/L (ref 3.4–5.3)
SODIUM SERPL-SCNC: 139 MMOL/L (ref 133–144)

## 2020-10-20 PROCEDURE — 99283 EMERGENCY DEPT VISIT LOW MDM: CPT | Mod: 25

## 2020-10-20 PROCEDURE — 96360 HYDRATION IV INFUSION INIT: CPT

## 2020-10-20 PROCEDURE — 258N000003 HC RX IP 258 OP 636: Performed by: EMERGENCY MEDICINE

## 2020-10-20 PROCEDURE — 80048 BASIC METABOLIC PNL TOTAL CA: CPT | Performed by: EMERGENCY MEDICINE

## 2020-10-20 RX ADMIN — SODIUM CHLORIDE 1000 ML: 9 INJECTION, SOLUTION INTRAVENOUS at 16:45

## 2020-10-20 ASSESSMENT — ENCOUNTER SYMPTOMS
FEVER: 0
CONSTIPATION: 0
VOMITING: 0
NAUSEA: 0
TROUBLE SWALLOWING: 1
DIARRHEA: 0
COUGH: 1

## 2020-10-20 NOTE — DISCHARGE INSTRUCTIONS
Nothing to eat or drink after midnight.  [address] [place], at 8 AM for endoscopy.  You will need a ride home after sedation.  Return to the ER if you have new concerns or changes overnight.

## 2020-10-20 NOTE — TELEPHONE ENCOUNTER
Reason for call:  Patient reporting a symptom    Symptom or request: Difficulty swallowing She cant get fluids down    Duration (how long have symptoms been present): For a few days she has been having trouble swallowing food and she just ate slow but this morning she cant get fluids down   She has had this before about 18 years ago and it was GERD    No opening until this afternoon and I Checked [facility] and [facility]    Have you been treated for this before? No    Additional comments: Patient has insurance that states this is her Primary care clinic    Phone Number patient can be reached at:  Cell number on file:    Telephone Information:   Mobile [phone]       Best Time:  anytime    Can we leave a detailed message on this number:  YES    Call taken on 10/20/2020 at 9:15 AM by Tonya Mukherjee

## 2020-10-20 NOTE — ED PROVIDER NOTES
"  History     Chief Complaint:  Dysphagia    The history is provided by the patient.      Vandana Gonzalez is a 60 year old female with a history of GERD who presents with dysphagia. Vandana has not seen gastroenterology in 9 years since an endoscopy which revealed esophagitis and severe stenosis for which she was started on omeprazole. For the past 6 months she has had increased difficulty swallowing solid foods. She has been able to eat by taking small bites and eating a diet mostly of soft foods like soup and yogurt. This morning, she noticed difficulty swallowing her morning coffee which seemed to be getting stuck and \"coming right back up\". She has an associated full sensation in the center of her chest but no significant pain. She has been able to swallow her oral secretions without difficulty. She endorses months of a \"phlegmy\" cough but otherwise denies nausea, vomiting, fever, or irregular bowel movements.     Allergies:  The patient has no known drug allergies.     Medications:    Prilosec      Past Medical History:    Anxiety   IBS   Hypertension   Klippel Tranaunay disease     Past Surgical History:    Phyllodes tumor left breast   Multiple vein stripping-left   Femur surgery-left   UGI endoscopy -2002     Family History:    Colorectal cancer     Social History:  Presents to the ED with: her    Tobacco use: Former smoker  Alcohol use: 2 drinks/night  Drug use: No  Marital Status:      Review of Systems   Constitutional: Negative for fever and unexpected weight change.   HENT: Positive for trouble swallowing.    Respiratory: Positive for cough.    Cardiovascular: Negative for chest pain.   Gastrointestinal: Negative for blood in stool, constipation, diarrhea, nausea and vomiting.   All other systems reviewed and are negative.    Physical Exam     Patient Vitals for the past 24 hrs:   BP Temp Temp src Pulse Resp SpO2 Weight   10/20/20 1729 (!) 177/82 -- -- 85 -- 100 % --   10/20/20 1357 (!) 179/87 " 98.4  F (36.9  C) Oral 107 20 100 % 99.8 kg (220 lb)       Physical Exam  General: Well-developed and well-nourished. Well appearing middle aged  woman. Cooperative.  Head:  Atraumatic.  Eyes:  Conjunctivae, lids, and sclerae are normal.  Neck:  Supple. Normal range of motion.  CV:  Regular rate and rhythm. Normal heart sounds with no murmurs, rubs, or gallops detected.  Resp:  No respiratory distress. Clear to auscultation bilaterally without decreased breath sounds, wheezing, rales, or rhonchi.  GI:  Non-distended.    MS:  Normal ROM.  Skin:  Warm. Non-diaphoretic. No pallor.  Neuro:  Awake. A&Ox3. Normal strength.  Psych: Normal mood and affect. Normal speech.  Vitals reviewed.  Emergency Department Course   Laboratory:  BMP: Glucose 105 (H), Creatinine: 0.45 (L), o/w WNL     Interventions:  1645 NS 1L IV     Emergency Department Course:  Nursing notes and vitals reviewed. (1620) I performed an exam of the patient as documented above. She tolerated water.    IV inserted. Medicine administered as documented above. Blood drawn. This was sent to the lab for further testing, results above.    (1631) I consulted with Dr. Chavez, gastroenterology, regarding the patient's history and presentation here in the emergency department. He recommends giving her fluids and he will see her tomorrow in clinic for an endoscopy.     (1732) I rechecked the patient and discussed the results of her workup thus far.     Findings and plan explained to the patient. Patient discharged home with instructions regarding supportive care, medications, and reasons to return. The importance of close follow-up was reviewed.     I personally reviewed the laboratory results with the patient and answered all related questions prior to discharge.     Impression & Plan      Medical Decision Making:  Vandana is a 60-year-old female who had endoscopy in 2011 that did show severe stricture of the esophagus.  She is on omeprazole but has had no  further endoscopies or GI follow-up since that time.  Over the last several months, she has had difficulty swallowing solids but has been able to stay nourished with soft foods and liquids.  This morning she became concerned when she had difficulty swallowing liquids as well.  She denies all other concerns or complaints and appears quite well on exam.  I discussed her case with Dr. Chavez GI, who agrees she is appropriate for discharge as Vandana was able to tolerate water here for me without vomiting.  She remarks it is improved since this morning but worse than her recent baseline.  Dr. Chavez was able to arrange endoscopy tomorrow morning at 8 a.m. and I updated Vandana on the need to remain NPO after midnight and to have a  after her sedation.  She was given the address and phone number for Dr. Chavez's office.  She was given IV fluids here to make sure she was well hydrated. BMP is unremarkable without significant electrolyte derangements or kidney injury.  Vandana is comfortable with the aforementioned plan and has no further concerns.  I answered all her questions and provided return precautions.    Of note, as she is moderately hypertensive here to 170s systolic.  She does not follow with primary care and does not take antihypertensives.  I encouraged her to establish care and discuss health management as I suspect she will need antihypertensives.    Diagnosis:    ICD-10-CM    1. Dysphagia, unspecified type  R13.10        Disposition:  discharged home    Scribe Disclosure:  I, Guerita Garrett, am serving as a scribe on 10/20/2020 at 4:20 PM to personally document services performed by Lucretia Acevedo MD based on my observations and the provider's statements to me.     Guerita Garrett  10/20/2020   [facility] EMERGENCY DEPT         Lucretia Acevedo MD  10/22/20 0155

## 2020-10-20 NOTE — ED AVS SNAPSHOT
[facility] Emergency Dept  [address] [address]  Phone: [phone]  Fax: [phone]                                    Vandana Gonzalez   MRN: [id number]    Department: [facility] Emergency Dept   Date of Visit: 10/20/2020           After Visit Summary Signature Page    I have received my discharge instructions, and my questions have been answered. I have discussed any challenges I see with this plan with the nurse or doctor.    ..........................................................................................................................................  Patient/Patient Representative Signature      ..........................................................................................................................................  Patient Representative Print Name and Relationship to Patient    ..................................................               ................................................  Date                                   Time    ..........................................................................................................................................  Reviewed by Signature/Title    ...................................................              ..............................................  Date                                               Time          22EPIC Rev 08/18

## 2020-10-21 ENCOUNTER — TRANSFERRED RECORDS (OUTPATIENT)
Dept: HEALTH INFORMATION MANAGEMENT | Facility: CLINIC | Age: 60
End: 2020-10-21
Payer: COMMERCIAL

## 2020-10-22 ASSESSMENT — ENCOUNTER SYMPTOMS
UNEXPECTED WEIGHT CHANGE: 0
BLOOD IN STOOL: 0

## 2020-11-20 ENCOUNTER — TRANSFERRED RECORDS (OUTPATIENT)
Dept: HEALTH INFORMATION MANAGEMENT | Facility: CLINIC | Age: 60
End: 2020-11-20
Payer: COMMERCIAL

## 2020-11-25 ENCOUNTER — TRANSFERRED RECORDS (OUTPATIENT)
Dept: HEALTH INFORMATION MANAGEMENT | Facility: CLINIC | Age: 60
End: 2020-11-25
Payer: COMMERCIAL

## 2021-01-15 ENCOUNTER — TRANSFERRED RECORDS (OUTPATIENT)
Dept: HEALTH INFORMATION MANAGEMENT | Facility: CLINIC | Age: 61
End: 2021-01-15

## 2021-01-15 ENCOUNTER — HEALTH MAINTENANCE LETTER (OUTPATIENT)
Age: 61
End: 2021-01-15

## 2021-02-12 ENCOUNTER — TRANSFERRED RECORDS (OUTPATIENT)
Dept: HEALTH INFORMATION MANAGEMENT | Facility: CLINIC | Age: 61
End: 2021-02-12

## 2021-02-21 ENCOUNTER — TRANSFERRED RECORDS (OUTPATIENT)
Dept: HEALTH INFORMATION MANAGEMENT | Facility: CLINIC | Age: 61
End: 2021-02-21
Payer: COMMERCIAL

## 2021-04-23 ENCOUNTER — TRANSFERRED RECORDS (OUTPATIENT)
Dept: HEALTH INFORMATION MANAGEMENT | Facility: CLINIC | Age: 61
End: 2021-04-23

## 2021-05-21 ENCOUNTER — TRANSFERRED RECORDS (OUTPATIENT)
Dept: HEALTH INFORMATION MANAGEMENT | Facility: CLINIC | Age: 61
End: 2021-05-21

## 2021-06-14 ENCOUNTER — TRANSFERRED RECORDS (OUTPATIENT)
Dept: HEALTH INFORMATION MANAGEMENT | Facility: CLINIC | Age: 61
End: 2021-06-14

## 2021-07-02 ENCOUNTER — OFFICE VISIT (OUTPATIENT)
Dept: FAMILY MEDICINE | Facility: CLINIC | Age: 61
End: 2021-07-02
Payer: COMMERCIAL

## 2021-07-02 VITALS
BODY MASS INDEX: 30.21 KG/M2 | TEMPERATURE: 98.4 F | DIASTOLIC BLOOD PRESSURE: 77 MMHG | WEIGHT: 204 LBS | OXYGEN SATURATION: 100 % | HEART RATE: 99 BPM | HEIGHT: 69 IN | SYSTOLIC BLOOD PRESSURE: 159 MMHG

## 2021-07-02 DIAGNOSIS — H02.403 PTOSIS OF BOTH EYELIDS: ICD-10-CM

## 2021-07-02 DIAGNOSIS — K21.00 GASTROESOPHAGEAL REFLUX DISEASE WITH ESOPHAGITIS WITHOUT HEMORRHAGE: ICD-10-CM

## 2021-07-02 DIAGNOSIS — I10 HYPERTENSION, UNSPECIFIED TYPE: Primary | ICD-10-CM

## 2021-07-02 DIAGNOSIS — Z01.818 PREOP GENERAL PHYSICAL EXAM: ICD-10-CM

## 2021-07-02 PROCEDURE — 99214 OFFICE O/P EST MOD 30 MIN: CPT | Performed by: PHYSICIAN ASSISTANT

## 2021-07-02 PROCEDURE — 36415 COLL VENOUS BLD VENIPUNCTURE: CPT | Performed by: PHYSICIAN ASSISTANT

## 2021-07-02 PROCEDURE — 80048 BASIC METABOLIC PNL TOTAL CA: CPT | Performed by: PHYSICIAN ASSISTANT

## 2021-07-02 PROCEDURE — 93000 ELECTROCARDIOGRAM COMPLETE: CPT | Performed by: PHYSICIAN ASSISTANT

## 2021-07-02 RX ORDER — PANTOPRAZOLE SODIUM 40 MG/1
40 TABLET, DELAYED RELEASE ORAL 2 TIMES DAILY
Status: ON HOLD | COMMUNITY
Start: 2021-06-07 | End: 2021-10-08

## 2021-07-02 RX ORDER — SUCRALFATE 1 G/1
TABLET ORAL
COMMUNITY
Start: 2021-06-06 | End: 2021-10-06

## 2021-07-02 RX ORDER — AMLODIPINE BESYLATE 2.5 MG/1
2.5 TABLET ORAL DAILY
Qty: 60 TABLET | Refills: 0 | Status: SHIPPED | OUTPATIENT
Start: 2021-07-02 | End: 2021-10-06

## 2021-07-02 ASSESSMENT — MIFFLIN-ST. JEOR: SCORE: 1559.72

## 2021-07-02 NOTE — PROGRESS NOTES
[facility]  [address] [place] [address], [address]  [facility] [address]  Phone: [phone]  Primary Provider: No Ref-Primary, Physician  {FV AMB Performing Provider (Optional):540197}    {Provider  Link to PREOP SmartSet  Use this to apply standard patient instructions to AVS; includes medication directions, common orders, guidelines for anemia, warfarin, additional testing   :870655}  PREOPERATIVE EVALUATION:  Today's date: 7/2/2021    Vandana Gonzalez is a 60 year old female who presents for a preoperative evaluation.    Surgical Information:  Surgery/Procedure: ***  Surgery Location: ***  Surgeon: ***  Surgery Date: ***  Time of Surgery: ***  Where patient plans to recover: {Preop post recovery plans :000224}  Fax number for surgical facility: {SURGERY FAX NUMBER:[id number]}    Type of Anesthesia Anticipated: {ANESTHESIA:366488}    {2021 Provider Charting Preference for Preop :087779}    Subjective     HPI related to upcoming procedure: ***    Preop Questions 6/28/2021   1. Have you ever had a heart attack or stroke? No   2. Have you ever had surgery on your heart or blood vessels, such as a stent placement, a coronary artery bypass, or surgery on an artery in your head, neck, heart, or legs? No   3. Do you have chest pain with activity? No   4. Do you have a history of  heart failure? No   5. Do you currently have a cold, bronchitis or symptoms of other infection? No   6. Do you have a cough, shortness of breath, or wheezing? No   7. Do you or anyone in your family have previous history of blood clots? YES - ***   8. Do you or does anyone in your family have a serious bleeding problem such as prolonged bleeding following surgeries or cuts? No   9. Have you ever had problems with anemia or been told to take iron pills? No   10. Have you had any abnormal blood loss such as black, tarry or bloody stools, or abnormal vaginal bleeding? No   11. Have you ever had a blood transfusion? No   12. Are you willing  to have a blood transfusion if it is medically needed before, during, or after your surgery? Yes   13. Have you or any of your relatives ever had problems with anesthesia? No   14. Do you have sleep apnea, excessive snoring or daytime drowsiness? No   15. Do you have any artifical heart valves or other implanted medical devices like a pacemaker, defibrillator, or continuous glucose monitor? No   16. Do you have artificial joints? No   17. Are you allergic to latex? No   18. Is there any chance that you may be pregnant? No       Health Care Directive:  Patient does not have a Health Care Directive or Living Will: {ADVANCE_DIRECTIVE_STATUS:519351}    Preoperative Review of :  {Mnpmpreport:276334}  {Review MNPMP for all patients per [employer]CSI MNPMP Profile:044651}    {Chronic problem details (Optional) :165997}    Review of Systems  {ROS Preop Choices:051583}    Patient Active Problem List    Diagnosis Date Noted     CARDIOVASCULAR SCREENING; LDL GOAL LESS THAN 160 10/31/2010     Priority: Medium     Hypertensive disorder 10/29/2009     Priority: Medium     (Problem list name updated by automated process. Provider to review and confirm.)       NUMBNESS LEFT EXTREMITIES 06/26/2006     Priority: Medium     Anxiety state 06/26/2006     Priority: Medium     Problem list name updated by automated process. 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       Allergies   Allergen Reactions     No Known Allergies         Social History     Tobacco Use     Smoking status: Former Smoker     Smokeless tobacco: Never Used     Tobacco comment: Quit    Substance Use Topics     Alcohol use: Yes     Alcohol/week: 0.0 standard drinks     Comment: couple beers per day     {FAMILY HISTORY (Optional):038105529}  History   Drug Use No         Objective     LMP 2012     Physical Exam  {EXAM Preop Choices:288718}    Recent Labs   Lab Test 10/20/20  1646      POTASSIUM 3.7   CR 0.45*        Diagnostics:  {LABS:572989}   {EK}    Revised Cardiac Risk Index (RCRI):  The patient has the following serious cardiovascular risks for perioperative complications:  {PREOP REVISED CARDIAC RISK INDEX (RCRI) :290553::\" - No serious cardiac risks = 0 points\"}     RCRI Interpretation: {REVISED CARDIAC RISK INTERPRETATION :594074}         Signed Electronically by: Aye Wong PA-C  Copy of this evaluation report is provided to requesting physician.    {Provider Resources  Preop [facility] Preop Guidelines  Revised Cardiac Risk Index :078473}  "

## 2021-07-02 NOTE — PATIENT INSTRUCTIONS
Take protonix w/a sip of water the morning of surgery    Take carafate after your surgery    Don't take any NSAIDs (ibuprofen, aspirin or naproxen) one week prior to surgery    Honoring Choices (Health Care Directive) Phone:  [phone]  Hours 6a to 5p Monday-Friday  & Limited hours on weekends  Please leave a message and we will return your call as soon as possible.    Please start amlodipine 2.5mg daily and check blood pressure daily and record, bring to next wellness visit

## 2021-07-02 NOTE — PROGRESS NOTES
[address], [address]  [facility] [address]  Phone: [phone]  Primary Provider: No Ref-Primary, Physician  Pre-op Performing Provider: ANGELO KAHN      PREOPERATIVE EVALUATION:  Today's date: 7/2/2021    Vandana Gonzalez is a 60 year old female who presents for a preoperative evaluation.    Surgical Information:  Surgery/Procedure: bilateral eyelid lift   Surgery Location: [facility] [place]   Surgeon: Dr. Jeannine Miramontes  Surgery Date: 07/16/2021  Time of Surgery: AM  Where patient plans to recover: At home with family  Fax number for surgical facility: [phone]    Type of Anesthesia Anticipated: Local with MAC    Assessment & Plan     The proposed surgical procedure is considered LOW risk.  Assessment and Plan:     (I10) Hypertension, unspecified type  (primary encounter diagnosis)  Comment: has had elevated pressures on last 8 visits  Plan: amLODIPine (NORVASC) 2.5 MG tablet        Will start w/above amlodipine dose and she will record pressures at home, may need to increase if not controlled at 2.5mg, will get BMP today, she needs to establish w/a pcp and will do so after surgery    (K21.00) Gastroesophageal reflux disease with esophagitis without hemorrhage  Comment: well controlled with ppi and carafate  Plan: take protonix am of surgery, carafate is large pill and takes with cup of water so will hold until after procedure    (H02.403) Ptosis of both eyelids  Comment: ongoing x years, bothers her most at night  Plan: surgery per above    (Z01.818) Preop general physical exam  Comment: low risk surgery, low risk patient, able to do at least 4 METS w/out difficulty  Plan: Basic metabolic panel  (Ca, Cl, CO2, Creat,         Gluc, K, Na, BUN), EKG 12-lead complete w/read         - Clinics            Risks and Recommendations:  The patient has the following additional risks and recommendations for perioperative complications:   - No  identified additional risk factors other than previously addressed    Medication Instructions:  Take protonix w/a sip of water the morning of surgery    Take carafate after your surgery    RECOMMENDATION:  APPROVAL GIVEN to proceed with proposed procedure, without further diagnostic evaluation.    40 minutes on the day of the encounter doing chart review, history and exam, documentation and further activities as noted above.      Aye Wong PA-C        Subjective     HPI related to upcoming procedure:     Vandana will have surgery for correction of bilateral ptosis. The ptosis bothers her during the day and worsens in the evening.    She feels well otherwise.  She hasn't been on blood pressure pills in the past. She is able to walk up a flight of stairs w/out chest pain or significant dyspnea.    She denies dizziness, headache, chest pain, shortness of breath, cough, abdominal pain, nausea/vomiting, diarrhea, urinary issues, new leg swelling (lymphedema left leg is chronic and unchanged).    Preop Questions 6/28/2021   1. Have you ever had a heart attack or stroke? No   2. Have you ever had surgery on your heart or blood vessels, such as a stent placement, a coronary artery bypass, or surgery on an artery in your head, neck, heart, or legs? No   3. Do you have chest pain with activity? No   4. Do you have a history of  heart failure? No   5. Do you currently have a cold, bronchitis or symptoms of other infection? No   6. Do you have a cough, shortness of breath, or wheezing? No   7. Do you or anyone in your family have previous history of blood clots? YES - blood clot 2002 left leg, after prolonged flight   8. Do you or does anyone in your family have a serious bleeding problem such as prolonged bleeding following surgeries or cuts? No   9. Have you ever had problems with anemia or been told to take iron pills? No   10. Have you had any abnormal blood loss such as black, tarry or bloody stools, or abnormal  vaginal bleeding? No   11. Have you ever had a blood transfusion? No   12. Are you willing to have a blood transfusion if it is medically needed before, during, or after your surgery? Yes   13. Have you or any of your relatives ever had problems with anesthesia? No   14. Do you have sleep apnea, excessive snoring or daytime drowsiness? No   15. Do you have any artifical heart valves or other implanted medical devices like a pacemaker, defibrillator, or continuous glucose monitor? No   16. Do you have artificial joints? No   17. Are you allergic to latex? No   18. Is there any chance that you may be pregnant? No       Health Care Directive:  Patient does not have a Health Care Directive or Living Will: Discussed advance care planning with patient; however, patient declined at this time.      Review of Systems  Constitutional, neuro, ENT, endocrine, pulmonary, cardiac, gastrointestinal, genitourinary, musculoskeletal, integument and psychiatric systems are negative, except as otherwise noted.    Patient Active Problem List    Diagnosis Date Noted     CARDIOVASCULAR SCREENING; LDL GOAL LESS THAN 160 10/31/2010     Priority: Medium     Hypertensive disorder 10/29/2009     Priority: Medium     (Problem list name updated by automated process. Provider to review and confirm.)       NUMBNESS LEFT EXTREMITIES 06/26/2006     Priority: Medium     Anxiety state 06/26/2006     Priority: Medium     Problem list name updated by automated process. 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, unspecified site     large  angioma adomen     Klippel Trenaunay disease 2002    AVM left leg     Phlebitis and thrombophlebitis of other deep vessels of lower extremities 6-2002    AVM left leg     Stasis ulcer of lower extremity (H) 2012    Left-recurrent     Past Surgical History:   Procedure Laterality Date     BIOPSY OF BREAST, INCISIONAL  7-09    phyllodes tumor left     C LIGATN FEMORAL VEIN      multiple vein strippings left     FEMUR SURGERY      2002 left side      HC UGI ENDOSCOPY, SIMPLE EXAM  4-2002    esophageal stricture     No current outpatient medications on file.       Allergies   Allergen Reactions     No Known Allergies         Social History     Tobacco Use     Smoking status: Former Smoker     Smokeless tobacco: Never Used     Tobacco comment: Quit 1994   Substance Use Topics     Alcohol use: Yes     Alcohol/week: 0.0 standard drinks     Comment: couple beers per day     Family History   Problem Relation Age of Onset     Cancer - colorectal Father      History   Drug Use No         Objective     LMP 08/05/2012     Physical Exam    GENERAL APPEARANCE: healthy, alert and no distress     EYES: no scleral icterus     HENT: OP clear, mouth without ulcers or lesions     NECK: supple, no bruits     RESP: lungs clear to auscultation - no rales, rhonchi or wheezes     CV: regular rates and rhythm, normal S1 S2, no S3 or S4 and no murmur, click or rub     ABDOMEN:  soft, nontender, no HSM or masses and bowel sounds normal     MS: extremities normal-LLE with lymphedema     NEURO: Normal mentation intact and speech normal     PSYCH: mentation appears normal. and affect normal/bright    Diagnostics:  Labs pending at this time.  BMP results will be reviewed when available.   EKG--NSR, rate 85, no ischemia    Revised Cardiac Risk Index (RCRI):  The patient has the following serious cardiovascular risks for perioperative complications:   - No serious cardiac risks = 0 points     RCRI Interpretation: 0 points: Class I (very low risk -  0.4% complication rate)        Signed Electronically by: Aye Wong PA-C  Copy of this evaluation report is provided to requesting physician.

## 2021-07-03 LAB
ANION GAP SERPL CALCULATED.3IONS-SCNC: 7 MMOL/L (ref 3–14)
BUN SERPL-MCNC: 7 MG/DL (ref 7–30)
CALCIUM SERPL-MCNC: 8.3 MG/DL (ref 8.5–10.1)
CHLORIDE SERPL-SCNC: 107 MMOL/L (ref 94–109)
CO2 SERPL-SCNC: 23 MMOL/L (ref 20–32)
CREAT SERPL-MCNC: 0.55 MG/DL (ref 0.52–1.04)
GFR SERPL CREATININE-BSD FRML MDRD: >90 ML/MIN/{1.73_M2}
GLUCOSE SERPL-MCNC: 97 MG/DL (ref 70–99)
POTASSIUM SERPL-SCNC: 3.7 MMOL/L (ref 3.4–5.3)
SODIUM SERPL-SCNC: 137 MMOL/L (ref 133–144)

## 2021-07-05 NOTE — RESULT ENCOUNTER NOTE
Dear Vandana,     Here are your recent results which are within the expected range. Please continue with your current plan of care and let us know if you have any questions or concerns.    Regards,  Aye Wong PA-C

## 2021-08-06 ENCOUNTER — TRANSFERRED RECORDS (OUTPATIENT)
Dept: HEALTH INFORMATION MANAGEMENT | Facility: CLINIC | Age: 61
End: 2021-08-06

## 2021-09-04 ENCOUNTER — HEALTH MAINTENANCE LETTER (OUTPATIENT)
Age: 61
End: 2021-09-04

## 2021-09-27 ENCOUNTER — TRANSFERRED RECORDS (OUTPATIENT)
Dept: HEALTH INFORMATION MANAGEMENT | Facility: CLINIC | Age: 61
End: 2021-09-27

## 2021-10-06 ENCOUNTER — TELEPHONE (OUTPATIENT)
Dept: FAMILY MEDICINE | Facility: CLINIC | Age: 61
End: 2021-10-06

## 2021-10-06 ENCOUNTER — HOSPITAL ENCOUNTER (OUTPATIENT)
Facility: CLINIC | Age: 61
Setting detail: OBSERVATION
Discharge: HOME | End: 2021-10-08
Attending: EMERGENCY MEDICINE | Admitting: INTERNAL MEDICINE
Payer: COMMERCIAL

## 2021-10-06 ENCOUNTER — OFFICE VISIT (OUTPATIENT)
Dept: FAMILY MEDICINE | Facility: CLINIC | Age: 61
End: 2021-10-06
Payer: COMMERCIAL

## 2021-10-06 VITALS
SYSTOLIC BLOOD PRESSURE: 167 MMHG | OXYGEN SATURATION: 100 % | BODY MASS INDEX: 29.53 KG/M2 | DIASTOLIC BLOOD PRESSURE: 75 MMHG | WEIGHT: 200 LBS | TEMPERATURE: 100 F | HEART RATE: 94 BPM | RESPIRATION RATE: 12 BRPM

## 2021-10-06 DIAGNOSIS — Q87.2 KLIPPEL TRENAUNAY SYNDROME: ICD-10-CM

## 2021-10-06 DIAGNOSIS — I10 PRIMARY HYPERTENSION: ICD-10-CM

## 2021-10-06 DIAGNOSIS — Z01.818 PREOPERATIVE EXAMINATION: Primary | ICD-10-CM

## 2021-10-06 DIAGNOSIS — H02.413 MECHANICAL PTOSIS OF BILATERAL EYELIDS: ICD-10-CM

## 2021-10-06 DIAGNOSIS — D64.9 ANEMIA, UNSPECIFIED TYPE: ICD-10-CM

## 2021-10-06 LAB
ABO/RH(D): NORMAL
ANION GAP SERPL CALCULATED.3IONS-SCNC: 8 MMOL/L (ref 3–14)
ANTIBODY SCREEN: NEGATIVE
BASOPHILS # BLD AUTO: 0 10E3/UL (ref 0–0.2)
BASOPHILS NFR BLD AUTO: 1 %
BLD PROD TYP BPU: NORMAL
BLD PROD TYP BPU: NORMAL
BLOOD COMPONENT TYPE: NORMAL
BLOOD COMPONENT TYPE: NORMAL
BUN SERPL-MCNC: 11 MG/DL (ref 7–30)
CALCIUM SERPL-MCNC: 8.5 MG/DL (ref 8.5–10.1)
CHLORIDE BLD-SCNC: 108 MMOL/L (ref 94–109)
CO2 SERPL-SCNC: 22 MMOL/L (ref 20–32)
CODING SYSTEM: NORMAL
CODING SYSTEM: NORMAL
CREAT SERPL-MCNC: 0.56 MG/DL (ref 0.52–1.04)
CROSSMATCH: NORMAL
CROSSMATCH: NORMAL
EOSINOPHIL # BLD AUTO: 0.1 10E3/UL (ref 0–0.7)
EOSINOPHIL NFR BLD AUTO: 2 %
ERYTHROCYTE [DISTWIDTH] IN BLOOD BY AUTOMATED COUNT: 21.4 % (ref 10–15)
ERYTHROCYTE [DISTWIDTH] IN BLOOD BY AUTOMATED COUNT: 21.9 % (ref 10–15)
FERRITIN SERPL-MCNC: 6 NG/ML (ref 8–252)
GFR SERPL CREATININE-BSD FRML MDRD: >90 ML/MIN/1.73M2
GLUCOSE BLD-MCNC: 123 MG/DL (ref 70–99)
HCT VFR BLD AUTO: 20.4 % (ref 35–47)
HCT VFR BLD AUTO: 20.4 % (ref 35–47)
HEMOCCULT SP1 STL QL: NEGATIVE
HGB BLD-MCNC: 5 G/DL (ref 11.7–15.7)
HGB BLD-MCNC: 5.3 G/DL (ref 11.7–15.7)
HOLD SPECIMEN: NORMAL
HOLD SPECIMEN: NORMAL
IMM GRANULOCYTES # BLD: 0 10E3/UL
IMM GRANULOCYTES NFR BLD: 1 %
IRON SATN MFR SERPL: 4 % (ref 15–46)
IRON SERPL-MCNC: 16 UG/DL (ref 35–180)
ISSUE DATE AND TIME: NORMAL
ISSUE DATE AND TIME: NORMAL
LDH SERPL L TO P-CCNC: 262 U/L (ref 81–234)
LYMPHOCYTES # BLD AUTO: 1.5 10E3/UL (ref 0.8–5.3)
LYMPHOCYTES NFR BLD AUTO: 23 %
MCH RBC QN AUTO: 15.1 PG (ref 26.5–33)
MCH RBC QN AUTO: 15.9 PG (ref 26.5–33)
MCHC RBC AUTO-ENTMCNC: 24.5 G/DL (ref 31.5–36.5)
MCHC RBC AUTO-ENTMCNC: 26 G/DL (ref 31.5–36.5)
MCV RBC AUTO: 61 FL (ref 78–100)
MCV RBC AUTO: 62 FL (ref 78–100)
MONOCYTES # BLD AUTO: 0.5 10E3/UL (ref 0–1.3)
MONOCYTES NFR BLD AUTO: 8 %
NEUTROPHILS # BLD AUTO: 4.3 10E3/UL (ref 1.6–8.3)
NEUTROPHILS NFR BLD AUTO: 65 %
NRBC # BLD AUTO: 0 10E3/UL
NRBC BLD AUTO-RTO: 1 /100
PLATELET # BLD AUTO: 400 10E3/UL (ref 150–450)
PLATELET # BLD AUTO: 420 10E3/UL (ref 150–450)
POTASSIUM BLD-SCNC: 3.5 MMOL/L (ref 3.4–5.3)
RBC # BLD AUTO: 3.31 10E6/UL (ref 3.8–5.2)
RBC # BLD AUTO: 3.33 10E6/UL (ref 3.8–5.2)
RETICS # AUTO: 0.05 10E6/UL (ref 0.03–0.1)
RETICS/RBC NFR AUTO: 1.7 % (ref 0.5–2)
SODIUM SERPL-SCNC: 138 MMOL/L (ref 133–144)
SPECIMEN EXPIRATION DATE: NORMAL
TIBC SERPL-MCNC: 449 UG/DL (ref 240–430)
UNIT ABO/RH: NORMAL
UNIT ABO/RH: NORMAL
UNIT NUMBER: NORMAL
UNIT NUMBER: NORMAL
UNIT STATUS: NORMAL
UNIT STATUS: NORMAL
UNIT TYPE ISBT: 5100
UNIT TYPE ISBT: 5100
WBC # BLD AUTO: 5.6 10E3/UL (ref 4–11)
WBC # BLD AUTO: 6.5 10E3/UL (ref 4–11)

## 2021-10-06 PROCEDURE — 36415 COLL VENOUS BLD VENIPUNCTURE: CPT | Performed by: INTERNAL MEDICINE

## 2021-10-06 PROCEDURE — 85027 COMPLETE CBC AUTOMATED: CPT | Performed by: EMERGENCY MEDICINE

## 2021-10-06 PROCEDURE — 99214 OFFICE O/P EST MOD 30 MIN: CPT | Performed by: INTERNAL MEDICINE

## 2021-10-06 PROCEDURE — 82270 OCCULT BLOOD FECES: CPT | Performed by: EMERGENCY MEDICINE

## 2021-10-06 PROCEDURE — 86900 BLOOD TYPING SEROLOGIC ABO: CPT | Performed by: EMERGENCY MEDICINE

## 2021-10-06 PROCEDURE — C9803 HOPD COVID-19 SPEC COLLECT: HCPCS

## 2021-10-06 PROCEDURE — 83550 IRON BINDING TEST: CPT | Performed by: EMERGENCY MEDICINE

## 2021-10-06 PROCEDURE — 85045 AUTOMATED RETICULOCYTE COUNT: CPT | Performed by: EMERGENCY MEDICINE

## 2021-10-06 PROCEDURE — 83615 LACTATE (LD) (LDH) ENZYME: CPT | Performed by: EMERGENCY MEDICINE

## 2021-10-06 PROCEDURE — 99285 EMERGENCY DEPT VISIT HI MDM: CPT | Mod: 25

## 2021-10-06 PROCEDURE — 83010 ASSAY OF HAPTOGLOBIN QUANT: CPT | Performed by: EMERGENCY MEDICINE

## 2021-10-06 PROCEDURE — 80048 BASIC METABOLIC PNL TOTAL CA: CPT | Performed by: EMERGENCY MEDICINE

## 2021-10-06 PROCEDURE — 86923 COMPATIBILITY TEST ELECTRIC: CPT | Performed by: EMERGENCY MEDICINE

## 2021-10-06 PROCEDURE — 36430 TRANSFUSION BLD/BLD COMPNT: CPT

## 2021-10-06 PROCEDURE — P9016 RBC LEUKOCYTES REDUCED: HCPCS | Performed by: EMERGENCY MEDICINE

## 2021-10-06 PROCEDURE — 36415 COLL VENOUS BLD VENIPUNCTURE: CPT | Performed by: EMERGENCY MEDICINE

## 2021-10-06 PROCEDURE — 85018 HEMOGLOBIN: CPT | Performed by: EMERGENCY MEDICINE

## 2021-10-06 PROCEDURE — 80048 BASIC METABOLIC PNL TOTAL CA: CPT | Performed by: INTERNAL MEDICINE

## 2021-10-06 PROCEDURE — 82728 ASSAY OF FERRITIN: CPT | Performed by: EMERGENCY MEDICINE

## 2021-10-06 PROCEDURE — 85025 COMPLETE CBC W/AUTO DIFF WBC: CPT | Performed by: INTERNAL MEDICINE

## 2021-10-06 RX ORDER — HYDROCHLOROTHIAZIDE 12.5 MG/1
12.5 TABLET ORAL DAILY
Qty: 60 TABLET | Refills: 3 | Status: SHIPPED | OUTPATIENT
Start: 2021-10-06 | End: 2022-05-27 | Stop reason: ALTCHOICE

## 2021-10-06 RX ORDER — FERROUS SULFATE 325(65) MG
325 TABLET ORAL
Qty: 30 TABLET | Refills: 0 | Status: SHIPPED | OUTPATIENT
Start: 2021-10-06 | End: 2022-02-28

## 2021-10-06 ASSESSMENT — ENCOUNTER SYMPTOMS
LIGHT-HEADEDNESS: 0
SHORTNESS OF BREATH: 0
FEVER: 0
BLOOD IN STOOL: 0
WEAKNESS: 0
HEADACHES: 0
FATIGUE: 1
CHEST TIGHTNESS: 0
CHILLS: 0
PALPITATIONS: 0

## 2021-10-06 NOTE — PATIENT INSTRUCTIONS
Avoid aspirin 7 days before the surgery. Avoid nonsteroidal anti-inflammatory pain medication like ibuprofen, Motrin, or Aleve 7 days before the surgery.  Tylenol can be used for pain.  Avoid any over the counter multivitamins or herbal supplement 7 days before surgery   You can resume these medications after surgery

## 2021-10-06 NOTE — PROGRESS NOTES
Critical lab value of hgb 5.3   Called patient to let her know she needs to go to the ER right now. She agreed and will have her  drive her to the ER.

## 2021-10-06 NOTE — ED PROVIDER NOTES
History   Chief Complaint:  Abnormal Labs       HPI   Vandana Gonzalez is a 61 year old female with history of GERD and hypertension who presents with abnormal labs. Patient had an upper endoscopy back in May of this year and was told she had a stenosis of her esophagus but no bleeding. She has had 2 more endoscopies since, the latest being 9 days ago. Today, she was in her clinic having pre-op labs drawn and was told that she had a critically low hemoglobin of 5.3. she has not been feeling unwell recently. Her only complaint is that she often feels fatigued. She denies any weakness, chest pain, blood in her stool, vaginal bleeding, or leg swelling. She has never had a colonoscopy.       Review of Systems   Constitutional: Positive for fatigue.   Cardiovascular: Negative for chest pain and leg swelling.   Gastrointestinal: Negative for blood in stool.   Genitourinary: Negative for vaginal bleeding.   Neurological: Negative for weakness.   All other systems reviewed and are negative.    Allergies:  Gadolinium    Medications:  Protonix   Hydrodiuril     Past Medical History:     Femur fracture   Klippel Trenaunay disease   Stsis ulcer of lower extremity  GERD  Hypertension       Past Surgical History:    Broken femur repair   Breast biopsy   Multiple vein strippings   Endoscopy      Family History:    Father - colorectal   Brother - diabetes     Social History:  Patient presents to the ED alone.    Physical Exam     Patient Vitals for the past 24 hrs:   BP Temp Temp src Pulse Resp SpO2   10/06/21 2055 -- -- -- -- -- 98 %   10/06/21 2050 -- -- -- -- -- 98 %   10/06/21 2045 (!) 155/84 98.9  F (37.2  C) -- 85 16 100 %   10/06/21 2040 -- -- -- -- -- 98 %   10/06/21 1945 -- 98.9  F (37.2  C) -- -- 16 --   10/06/21 1934 (!) 152/80 98.8  F (37.1  C) -- 90 16 --   10/06/21 1720 (!) 167/77 97.2  F (36.2  C) Oral 100 12 100 %       Physical Exam  VS: Reviewed per above  HENT: Mucous membranes moist  EYES: sclera anicteric  CV:  Rate as noted, regular rhythm.   RESP: Effort normal. Breath sounds are normal bilaterally.  GI: no tenderness/rebound/guarding, not distended.  NEURO: Alert, moving all extremities  MSK: No deformity of the extremities  SKIN: Warm and dry    Emergency Department Course       Imaging:  No orders to display       Laboratory:  Labs Ordered and Resulted from Time of ED Arrival Up to the Time of Departure from the ED   BASIC METABOLIC PANEL - Abnormal; Notable for the following components:       Result Value    Glucose 123 (*)     All other components within normal limits   CBC WITH PLATELETS AND DIFFERENTIAL - Abnormal; Notable for the following components:    RBC Count 3.31 (*)     Hemoglobin 5.0 (*)     Hematocrit 20.4 (*)     MCV 62 (*)     MCH 15.1 (*)     MCHC 24.5 (*)     RDW 21.4 (*)     NRBCs per 100 WBC 1 (*)     All other components within normal limits   FERRITIN - Abnormal; Notable for the following components:    Ferritin 6 (*)     All other components within normal limits   IRON AND IRON BINDING CAPACITY - Abnormal; Notable for the following components:    Iron 16 (*)     Iron Binding Capacity 449 (*)     Iron Sat Index 4 (*)     All other components within normal limits   LACTATE DEHYDROGENASE - Abnormal; Notable for the following components:    Lactate Dehydrogenase 262 (*)     All other components within normal limits   OCCULT BLOOD STOOL 1-3 SPEC - Normal   RETICULOCYTE COUNT - Normal   EXTRA RED TOP TUBE   EXTRA BLOOD BANK PURPLE TOP TUBE   HAPTOGLOBIN   TYPE AND SCREEN, ADULT   PREPARE RED BLOOD CELLS (UNIT)   PREPARE RED BLOOD CELLS (UNIT)   PREPARE RED BLOOD CELLS (UNIT)   TRANSFUSE RED BLOOD CELLS (UNIT)   CBC WITH PLATELETS & DIFFERENTIAL    Narrative:     The following orders were created for panel order CBC with platelets + differential.  Procedure                               Abnormality         Status                     ---------                               -----------         ------                      CBC with platelets and d...[[id number]]  Abnormal            Final result                 Please view results for these tests on the individual orders.   ABO/RH TYPE AND SCREEN    Narrative:     The following orders were created for panel order ABO/Rh type and screen.  Procedure                               Abnormality         Status                     ---------                               -----------         ------                     Adult Type and Screen[[id number]]                            Edited Result - FINAL        Please view results for these tests on the individual orders.   EXTRA TUBE    Narrative:     The following orders were created for panel order [place] Draw.  Procedure                               Abnormality         Status                     ---------                               -----------         ------                     Extra Red Top Tube[[id number]]                               Final result               Extra Blood Bank Purple ...[[id number]]                      Final result                 Please view results for these tests on the individual orders.       Emergency Department Course:  Reviewed:  I reviewed nursing notes, vitals, past medical history and Care Everywhere    Assessments:    1902 I obtained history and examined the patient as noted above.     9:40 PM I rechecked the patient and explained findings.       Interventions:  2u PRBC    Disposition:  Care of the patient was transferred to my colleague Dr. Harper pending completion of blood transfusion and repeat hgb check thereafter.     Impression & Plan       Medical Decision Making:  Patient presents to the ER for evaluation of incidental anemia discovered on preoperative laboratory testing and primary care.  On arrival vital signs are reassuring.  Hemoglobin is confirmed to be 5.  Patient describes may be feeling fatigued but does not have any other symptoms.  She was consented for 2 units of packed  red cells.  Iron studies were added on it does appear that she is somewhat iron deficient.  No history to support blood loss anemia.  She is Hemoccult negative.  She has had recent EGDs for esophageal stenosis (without abnormal findings to suggest upper GI bleeding source) but never has had a colonoscopy.  I recommended she follow-up with her GI physician to discuss this.  I also recommended follow-up with primary care to discuss ongoing anemia management and evaluation.  At signout to my colleague, patient was awaiting completion of her transfusions.  If repeat hemoglobin is near transfusion threshold, plan for discharge home with iron supplementation.    Diagnosis:    ICD-10-CM    1. Anemia, unspecified type  D64.9        Discharge Medications:  New Prescriptions    FERROUS SULFATE (FEROSUL) 325 (65 FE) MG TABLET    Take 1 tablet (325 mg) by mouth daily (with breakfast)       Scribe Disclosure:  I, Last Avila, am serving as a scribe at 6:53 PM on 10/6/2021 to document services personally performed by Ned Thomson MD based on my observations and the provider's statements to me.            Ned Thomson MD  10/06/21 7721

## 2021-10-06 NOTE — TELEPHONE ENCOUNTER
"Doctor Tia huddled with triage. Provider advised pt be seen a today at ER given critical hgb value below for blood transfusion. Pt was surprised and states she feels fine. \"I cant\". States she is at work and has other things to do today. Triage stressed she seek care at ER now for possible need of blood trafusion but pt declines. States \"I will look in to it tomorrow\" \"I can't today\".      Hemoglobin   Date Value Ref Range Status   10/06/2021 5.3 (LL) 11.7 - 15.7 g/dL Final   11/26/2002 10.8 (L) 11.7 - 15.7 g/dL Final   ]    "

## 2021-10-06 NOTE — PROGRESS NOTES
[address], [address]  [facility] [address]  Phone: [phone]  Primary Provider: No Ref-Primary, Physician  Pre-op Performing Provider: PHIL STEVENS      PREOPERATIVE EVALUATION:  Today's date: 10/6/2021    Vandana Gonzalez is a 61 year old female who presents for a preoperative evaluation.    Surgical Information:  Surgery/Procedure: Bilateral Blephoplasty  Surgery Location: [facility]  Surgeon: Dr. Jeannine Miramontes  Surgery Date: 10/19/2021  Time of Surgery: 12:30 pm  Where patient plans to recover: At home with family  Fax number for surgical facility: .[phone]    Type of Anesthesia Anticipated: General    Assessment & Plan     The proposed surgical procedure is considered LOW risk.    Primary hypertension  Elevated blood pressure in office today and previous visits.  Started on HCTZ 12.5 mg daily to be taken in the morning.  Will check BMP in 1 week.  - hydrochlorothiazide (HYDRODIURIL) 12.5 MG tablet; Take 1 tablet (12.5 mg) by mouth daily    Klippel Trenaunay syndrome    Preoperative examination  - CBC with platelets; Future  - Basic metabolic panel  (Ca, Cl, CO2, Creat, Gluc, K, Na, BUN); Future    Bilateral eye ptosis:      Risks and Recommendations:  The patient has the following additional risks and recommendations for perioperative complications:   - No identified additional risk factors other than previously addressed    Medication Instructions:  Patient is to take all scheduled medications on the day of surgery    RECOMMENDATION:  APPROVAL GIVEN to proceed with proposed procedure, without further diagnostic evaluation.      Subjective     HPI related to upcoming procedure:     Vandana is a 61 year old lady who presented to the clinic for preoperative exam.  She is undergoing bilateral eyelid surgery for ptosis.  She has a history of elevated blood pressure during office visits.  Last week she checked blood pressure it was 142/60.  She denies  symptoms of chest pain, shortness of breath, palpitations, headache, syncope, lightheadedness, fever, chills.  She does not have a history of TIA or stroke.  She does not have a history of heart failure.  She is able to climb 1 flight of stairs without feeling short of breath or having chest pain.  She has no history of complications with anesthesia.  She does not smoke.  Drinks alcohol about 14 drinks per week.        Preop Questions 10/4/2021   1. Have you ever had a heart attack or stroke? No   2. Have you ever had surgery on your heart or blood vessels, such as a stent placement, a coronary artery bypass, or surgery on an artery in your head, neck, heart, or legs? No   3. Do you have chest pain with activity? No   4. Do you have a history of  heart failure? No   5. Do you currently have a cold, bronchitis or symptoms of other infection? No   6. Do you have a cough, shortness of breath, or wheezing? No   7. Do you or anyone in your family have previous history of blood clots? No   8. Do you or does anyone in your family have a serious bleeding problem such as prolonged bleeding following surgeries or cuts? No   9. Have you ever had problems with anemia or been told to take iron pills? No   10. Have you had any abnormal blood loss such as black, tarry or bloody stools, or abnormal vaginal bleeding? No   11. Have you ever had a blood transfusion? No   12. Are you willing to have a blood transfusion if it is medically needed before, during, or after your surgery? Yes   13. Have you or any of your relatives ever had problems with anesthesia? No   14. Do you have sleep apnea, excessive snoring or daytime drowsiness? No   15. Do you have any artifical heart valves or other implanted medical devices like a pacemaker, defibrillator, or continuous glucose monitor? No   16. Do you have artificial joints? No   17. Are you allergic to latex? No   18. Is there any chance that you may be pregnant? -       Health Care  Directive:  Patient does not have a Health Care Directive or Living Will: Discussed advance care planning with patient; however, patient declined at this time.    Preoperative Review of :   reviewed - no record of controlled substances prescribed.      Review of Systems   Constitutional: Negative for chills and fever.   Respiratory: Negative for chest tightness and shortness of breath.    Cardiovascular: Negative for chest pain and palpitations.   Neurological: Negative for syncope, light-headedness and headaches.       Patient Active Problem List    Diagnosis Date Noted     CARDIOVASCULAR SCREENING; LDL GOAL LESS THAN 160 10/31/2010     Priority: Medium     Hypertensive disorder 10/29/2009     Priority: Medium     (Problem list name updated by automated process. Provider to review and confirm.)       NUMBNESS LEFT EXTREMITIES 06/26/2006     Priority: Medium     Anxiety state 06/26/2006     Priority: Medium     Problem list name updated by automated process. 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, unspecified site     large angioma adomen     Klippel Trenaunay disease 2002    AVM left leg     Phlebitis and thrombophlebitis of other deep vessels of lower extremities 6-2002    AVM left leg     Stasis ulcer of lower extremity (H) 2012    Left-recurrent     Past Surgical History:   Procedure Laterality Date     BIOPSY OF BREAST, INCISIONAL  7-09    phyllodes tumor left     BREAST SURGERY  7-2009     C LIGATN FEMORAL VEIN      multiple vein strippings left     FEMUR SURGERY      2002 left side       ORTHOPEDIC SURGERY      repair broken femur     ZZHC UGI ENDOSCOPY, SIMPLE EXAM      esophageal stricture     Current Outpatient Medications   Medication Sig Dispense Refill     pantoprazole (PROTONIX) 40 MG EC tablet Take 40 mg by mouth 2 times daily         Allergies   Allergen Reactions     No Known Allergies         Social History     Tobacco Use     Smoking status: Former Smoker     Packs/day: 0.25     Years: 2.00     Pack years: 0.50     Types: Cigarettes     Start date: 10/1/1990     Quit date: 10/1/1992     Years since quittin.0     Smokeless tobacco: Never Used     Tobacco comment: Quit    Substance Use Topics     Alcohol use: Yes     Alcohol/week: 0.0 standard drinks     Comment: couple beers per day     History   Drug Use No         Objective     BP (!) 167/75 (BP Location: Right arm, Cuff Size: Adult Regular)   Pulse 94   Temp 100  F (37.8  C) (Temporal)   Resp 12   Wt 90.7 kg (200 lb)   LMP 2012   SpO2 100%   BMI 29.53 kg/m      Physical Exam  Vitals reviewed.   Constitutional:       Appearance: Normal appearance.   Cardiovascular:      Rate and Rhythm: Normal rate and regular rhythm.      Heart sounds: Normal heart sounds. No murmur heard.   No gallop.    Pulmonary:      Effort: Pulmonary effort is normal. No respiratory distress.      Breath sounds: Normal breath sounds. No wheezing or rales.   Neurological:      General: No focal deficit present.      Mental Status: She is alert and oriented to person, place, and time.   Psychiatric:         Mood and Affect: Mood normal.         Behavior: Behavior normal.         Recent Labs   Lab Test 21  1040 10/20/20  1646    139   POTASSIUM 3.7 3.7   CR 0.55 0.45*        Diagnostics: CBC, BMP    Revised Cardiac Risk Index (RCRI):  The patient has the following serious cardiovascular risks for perioperative complications:   - No serious cardiac risks = 0 points     RCRI Interpretation: 0 points: Class I (very low risk  - 0.4% complication rate)       Signed Electronically by: PHIL STEVENS MD  Copy of this evaluation report is provided to requesting physician.

## 2021-10-07 ENCOUNTER — TELEPHONE (OUTPATIENT)
Dept: WOUND CARE | Facility: CLINIC | Age: 61
End: 2021-10-07

## 2021-10-07 PROBLEM — D64.9 ANEMIA, UNSPECIFIED TYPE: Status: ACTIVE | Noted: 2021-10-07

## 2021-10-07 LAB
ANION GAP SERPL CALCULATED.3IONS-SCNC: 5 MMOL/L (ref 3–14)
ANION GAP SERPL CALCULATED.3IONS-SCNC: 9 MMOL/L (ref 3–14)
BLD PROD TYP BPU: NORMAL
BLD PROD TYP BPU: NORMAL
BLOOD COMPONENT TYPE: NORMAL
BLOOD COMPONENT TYPE: NORMAL
BUN SERPL-MCNC: 10 MG/DL (ref 7–30)
BUN SERPL-MCNC: 7 MG/DL (ref 7–30)
CALCIUM SERPL-MCNC: 8.4 MG/DL (ref 8.5–10.1)
CALCIUM SERPL-MCNC: 8.7 MG/DL (ref 8.5–10.1)
CHLORIDE BLD-SCNC: 109 MMOL/L (ref 94–109)
CHLORIDE BLD-SCNC: 109 MMOL/L (ref 94–109)
CO2 SERPL-SCNC: 19 MMOL/L (ref 20–32)
CO2 SERPL-SCNC: 26 MMOL/L (ref 20–32)
CODING SYSTEM: NORMAL
CODING SYSTEM: NORMAL
CREAT SERPL-MCNC: 0.52 MG/DL (ref 0.52–1.04)
CREAT SERPL-MCNC: 0.53 MG/DL (ref 0.52–1.04)
CROSSMATCH: NORMAL
CROSSMATCH: NORMAL
ERYTHROCYTE [DISTWIDTH] IN BLOOD BY AUTOMATED COUNT: 25.1 % (ref 10–15)
GFR SERPL CREATININE-BSD FRML MDRD: >90 ML/MIN/1.73M2
GFR SERPL CREATININE-BSD FRML MDRD: >90 ML/MIN/1.73M2
GLUCOSE BLD-MCNC: 106 MG/DL (ref 70–99)
GLUCOSE BLD-MCNC: 97 MG/DL (ref 70–99)
HAPTOGLOB SERPL-MCNC: 159 MG/DL (ref 32–197)
HCT VFR BLD AUTO: 22.8 % (ref 35–47)
HGB BLD-MCNC: 6.1 G/DL (ref 11.7–15.7)
HGB BLD-MCNC: 6.2 G/DL (ref 11.7–15.7)
HGB BLD-MCNC: 6.6 G/DL (ref 11.7–15.7)
HGB BLD-MCNC: 7.4 G/DL (ref 11.7–15.7)
INR PPP: 1.12 (ref 0.85–1.15)
ISSUE DATE AND TIME: NORMAL
ISSUE DATE AND TIME: NORMAL
MCH RBC QN AUTO: 17.5 PG (ref 26.5–33)
MCHC RBC AUTO-ENTMCNC: 26.8 G/DL (ref 31.5–36.5)
MCV RBC AUTO: 65 FL (ref 78–100)
PLATELET # BLD AUTO: 326 10E3/UL (ref 150–450)
POTASSIUM BLD-SCNC: 3.4 MMOL/L (ref 3.4–5.3)
POTASSIUM BLD-SCNC: 4.2 MMOL/L (ref 3.4–5.3)
RBC # BLD AUTO: 3.49 10E6/UL (ref 3.8–5.2)
SARS-COV-2 RNA RESP QL NAA+PROBE: NEGATIVE
SODIUM SERPL-SCNC: 137 MMOL/L (ref 133–144)
SODIUM SERPL-SCNC: 140 MMOL/L (ref 133–144)
UNIT ABO/RH: NORMAL
UNIT ABO/RH: NORMAL
UNIT NUMBER: NORMAL
UNIT NUMBER: NORMAL
UNIT STATUS: NORMAL
UNIT STATUS: NORMAL
UNIT TYPE ISBT: 5100
UNIT TYPE ISBT: 5100
UPPER GI ENDOSCOPY: NORMAL
WBC # BLD AUTO: 4.1 10E3/UL (ref 4–11)

## 2021-10-07 PROCEDURE — 99220 PR INITIAL OBSERVATION CARE,LEVEL III: CPT | Performed by: STUDENT IN AN ORGANIZED HEALTH CARE EDUCATION/TRAINING PROGRAM

## 2021-10-07 PROCEDURE — 36415 COLL VENOUS BLD VENIPUNCTURE: CPT | Performed by: PHYSICIAN ASSISTANT

## 2021-10-07 PROCEDURE — 85014 HEMATOCRIT: CPT | Performed by: STUDENT IN AN ORGANIZED HEALTH CARE EDUCATION/TRAINING PROGRAM

## 2021-10-07 PROCEDURE — 80048 BASIC METABOLIC PNL TOTAL CA: CPT | Performed by: STUDENT IN AN ORGANIZED HEALTH CARE EDUCATION/TRAINING PROGRAM

## 2021-10-07 PROCEDURE — 258N000003 HC RX IP 258 OP 636: Performed by: PHYSICIAN ASSISTANT

## 2021-10-07 PROCEDURE — 87635 SARS-COV-2 COVID-19 AMP PRB: CPT | Performed by: EMERGENCY MEDICINE

## 2021-10-07 PROCEDURE — 43235 EGD DIAGNOSTIC BRUSH WASH: CPT | Performed by: INTERNAL MEDICINE

## 2021-10-07 PROCEDURE — 250N000011 HC RX IP 250 OP 636: Performed by: INTERNAL MEDICINE

## 2021-10-07 PROCEDURE — 250N000013 HC RX MED GY IP 250 OP 250 PS 637: Performed by: STUDENT IN AN ORGANIZED HEALTH CARE EDUCATION/TRAINING PROGRAM

## 2021-10-07 PROCEDURE — 258N000003 HC RX IP 258 OP 636: Performed by: STUDENT IN AN ORGANIZED HEALTH CARE EDUCATION/TRAINING PROGRAM

## 2021-10-07 PROCEDURE — 85018 HEMOGLOBIN: CPT | Mod: 91 | Performed by: PHYSICIAN ASSISTANT

## 2021-10-07 PROCEDURE — G0378 HOSPITAL OBSERVATION PER HR: HCPCS

## 2021-10-07 PROCEDURE — 85610 PROTHROMBIN TIME: CPT | Performed by: STUDENT IN AN ORGANIZED HEALTH CARE EDUCATION/TRAINING PROGRAM

## 2021-10-07 PROCEDURE — 250N000013 HC RX MED GY IP 250 OP 250 PS 637: Performed by: INTERNAL MEDICINE

## 2021-10-07 PROCEDURE — P9016 RBC LEUKOCYTES REDUCED: HCPCS | Performed by: STUDENT IN AN ORGANIZED HEALTH CARE EDUCATION/TRAINING PROGRAM

## 2021-10-07 PROCEDURE — 250N000009 HC RX 250: Performed by: INTERNAL MEDICINE

## 2021-10-07 PROCEDURE — 999N000099 HC STATISTIC MODERATE SEDATION < 10 MIN: Performed by: INTERNAL MEDICINE

## 2021-10-07 PROCEDURE — 85018 HEMOGLOBIN: CPT | Performed by: PHYSICIAN ASSISTANT

## 2021-10-07 PROCEDURE — 86923 COMPATIBILITY TEST ELECTRIC: CPT | Performed by: STUDENT IN AN ORGANIZED HEALTH CARE EDUCATION/TRAINING PROGRAM

## 2021-10-07 PROCEDURE — 86923 COMPATIBILITY TEST ELECTRIC: CPT | Performed by: PHYSICIAN ASSISTANT

## 2021-10-07 PROCEDURE — 999N000197 HC STATISTIC WOC PT EDUCATION, 0-15 MIN

## 2021-10-07 PROCEDURE — G0463 HOSPITAL OUTPT CLINIC VISIT: HCPCS

## 2021-10-07 PROCEDURE — 36415 COLL VENOUS BLD VENIPUNCTURE: CPT | Performed by: STUDENT IN AN ORGANIZED HEALTH CARE EDUCATION/TRAINING PROGRAM

## 2021-10-07 PROCEDURE — P9016 RBC LEUKOCYTES REDUCED: HCPCS | Performed by: PHYSICIAN ASSISTANT

## 2021-10-07 RX ORDER — NALOXONE HYDROCHLORIDE 0.4 MG/ML
0.4 INJECTION, SOLUTION INTRAMUSCULAR; INTRAVENOUS; SUBCUTANEOUS
Status: DISCONTINUED | OUTPATIENT
Start: 2021-10-07 | End: 2021-10-08 | Stop reason: HOSPADM

## 2021-10-07 RX ORDER — NALOXONE HYDROCHLORIDE 0.4 MG/ML
0.2 INJECTION, SOLUTION INTRAMUSCULAR; INTRAVENOUS; SUBCUTANEOUS
Status: DISCONTINUED | OUTPATIENT
Start: 2021-10-07 | End: 2021-10-08 | Stop reason: HOSPADM

## 2021-10-07 RX ORDER — LIDOCAINE 40 MG/G
CREAM TOPICAL
Status: CANCELLED | OUTPATIENT
Start: 2021-10-07

## 2021-10-07 RX ORDER — FLUMAZENIL 0.1 MG/ML
0.2 INJECTION, SOLUTION INTRAVENOUS
Status: ACTIVE | OUTPATIENT
Start: 2021-10-07 | End: 2021-10-08

## 2021-10-07 RX ORDER — LIDOCAINE 40 MG/G
CREAM TOPICAL
Status: DISCONTINUED | OUTPATIENT
Start: 2021-10-07 | End: 2021-10-08 | Stop reason: HOSPADM

## 2021-10-07 RX ORDER — PANTOPRAZOLE SODIUM 40 MG/1
40 TABLET, DELAYED RELEASE ORAL 2 TIMES DAILY
Status: DISCONTINUED | OUTPATIENT
Start: 2021-10-07 | End: 2021-10-07

## 2021-10-07 RX ORDER — PANTOPRAZOLE SODIUM 40 MG/1
40 TABLET, DELAYED RELEASE ORAL
Status: DISCONTINUED | OUTPATIENT
Start: 2021-10-07 | End: 2021-10-08 | Stop reason: HOSPADM

## 2021-10-07 RX ORDER — ONDANSETRON 4 MG/1
4 TABLET, ORALLY DISINTEGRATING ORAL EVERY 6 HOURS PRN
Status: DISCONTINUED | OUTPATIENT
Start: 2021-10-07 | End: 2021-10-08 | Stop reason: HOSPADM

## 2021-10-07 RX ORDER — FENTANYL CITRATE 50 UG/ML
INJECTION, SOLUTION INTRAMUSCULAR; INTRAVENOUS PRN
Status: COMPLETED | OUTPATIENT
Start: 2021-10-07 | End: 2021-10-07

## 2021-10-07 RX ORDER — SODIUM CHLORIDE, SODIUM LACTATE, POTASSIUM CHLORIDE, CALCIUM CHLORIDE 600; 310; 30; 20 MG/100ML; MG/100ML; MG/100ML; MG/100ML
INJECTION, SOLUTION INTRAVENOUS CONTINUOUS
Status: DISCONTINUED | OUTPATIENT
Start: 2021-10-07 | End: 2021-10-07

## 2021-10-07 RX ORDER — SODIUM CHLORIDE, SODIUM LACTATE, POTASSIUM CHLORIDE, CALCIUM CHLORIDE 600; 310; 30; 20 MG/100ML; MG/100ML; MG/100ML; MG/100ML
INJECTION, SOLUTION INTRAVENOUS CONTINUOUS
Status: DISCONTINUED | OUTPATIENT
Start: 2021-10-07 | End: 2021-10-08

## 2021-10-07 RX ORDER — SUCRALFATE ORAL 1 G/10ML
1 SUSPENSION ORAL
Status: DISCONTINUED | OUTPATIENT
Start: 2021-10-07 | End: 2021-10-08 | Stop reason: HOSPADM

## 2021-10-07 RX ORDER — ONDANSETRON 2 MG/ML
4 INJECTION INTRAMUSCULAR; INTRAVENOUS EVERY 6 HOURS PRN
Status: DISCONTINUED | OUTPATIENT
Start: 2021-10-07 | End: 2021-10-08 | Stop reason: HOSPADM

## 2021-10-07 RX ORDER — IBUPROFEN 200 MG
200 TABLET ORAL EVERY 8 HOURS PRN
Status: ON HOLD | COMMUNITY
End: 2021-10-08

## 2021-10-07 RX ADMIN — SUCRALFATE ORAL 1 G: 1 SUSPENSION ORAL at 22:33

## 2021-10-07 RX ADMIN — SODIUM CHLORIDE, POTASSIUM CHLORIDE, SODIUM LACTATE AND CALCIUM CHLORIDE: 600; 310; 30; 20 INJECTION, SOLUTION INTRAVENOUS at 22:34

## 2021-10-07 RX ADMIN — SUCRALFATE ORAL 1 G: 1 SUSPENSION ORAL at 13:38

## 2021-10-07 RX ADMIN — MIDAZOLAM 2 MG: 1 INJECTION INTRAMUSCULAR; INTRAVENOUS at 12:14

## 2021-10-07 RX ADMIN — SODIUM CHLORIDE, POTASSIUM CHLORIDE, SODIUM LACTATE AND CALCIUM CHLORIDE: 600; 310; 30; 20 INJECTION, SOLUTION INTRAVENOUS at 05:53

## 2021-10-07 RX ADMIN — TOPICAL ANESTHETIC 1 EACH: 200 SPRAY DENTAL; PERIODONTAL at 12:13

## 2021-10-07 RX ADMIN — PANTOPRAZOLE SODIUM 40 MG: 40 TABLET, DELAYED RELEASE ORAL at 17:16

## 2021-10-07 RX ADMIN — FENTANYL CITRATE 100 MCG: 50 INJECTION, SOLUTION INTRAMUSCULAR; INTRAVENOUS at 12:14

## 2021-10-07 RX ADMIN — PANTOPRAZOLE SODIUM 40 MG: 40 TABLET, DELAYED RELEASE ORAL at 07:58

## 2021-10-07 RX ADMIN — SUCRALFATE ORAL 1 G: 1 SUSPENSION ORAL at 17:16

## 2021-10-07 NOTE — PLAN OF CARE
Pt A/O, VSS on RA ex htn. Denies pain. Up SBA w/ crutches in room. Hgb 6.1, transfused 1UPRBC, recheck 6.6, additional unit given, tolerated well. Hgb checks Q6h. EGD completed today, please see report. PIV infusing LR at 75ml/hr. Port wine stain to L side abd down to LLE. LLE +3 edema. Continent. GI following. Discharge tomorrow to home, pending hgb and after iron transfusion.

## 2021-10-07 NOTE — PROVIDER NOTIFICATION
MD Notification    Notified Person: MD    Notified Person Name: SULY Lopez    Notification Date/Time: 10/07/21, 4:04 PM    Notification Interaction: vocera message    Purpose of Notification: FYI hgb 6.6    Orders Received:    Comments:

## 2021-10-07 NOTE — DISCHARGE INSTRUCTIONS
Return for new weakness, chest pain, shortness of breath, black or bloody stools or heavy bleeding from elsewhere.  Follow-up with your primary care doctor and gastroenterologist to discuss your anemia further.  Take iron supplement daily.    [facility] call you to set up an appointment.  Call: [address] [place] [address] [phone] if you have not heard from them.     LLE wound every three to 4 days   Cleanse wound with saline, sterile water or wound cleanser  Pat dry  Apply alcohol free skin prep to surrounding skin  If you choose to use a topical therapy consider a light layer of medical grade honey (cvs/amazon) or plurogel (medline)  Apply thin silicone dressing (equivalent to mepilex lite)   Pad to your comfort level  Wear compression  Consider using a lymphedema pump twice daily starting with 10 -15 minutes to start and work up to what is prescribed by lymphedema therapy

## 2021-10-07 NOTE — PHARMACY-ADMISSION MEDICATION HISTORY
Pharmacy Medication History  Admission medication history interview status for the 10/6/2021  admission is complete. See EPIC admission navigator for prior to admission medications     Location of Interview: Patient room  Medication history sources: Patient    Significant changes made to the medication list:  Added: PRN ibuprofen    In the past week, patient estimated taking medication this percent of the time: greater than 90%    Additional medication history information:   Patient was recently prescribed Hydrochlorothiazide 12.5 mg daily, but has not picked this prescription up yet.     Medication reconciliation completed by provider prior to medication history? Yes    Time spent in this activity: 15 minutes     Prior to Admission medications    Medication Sig Last Dose Taking? Auth Provider   ferrous sulfate (FEROSUL) 325 (65 Fe) MG tablet Take 1 tablet (325 mg) by mouth daily (with breakfast)  Yes Ned Thomson MD   ibuprofen (ADVIL/MOTRIN) 200 MG tablet Take 200 mg by mouth every 8 hours as needed for mild pain Past Month at Unknown time Yes Unknown, Entered By History   pantoprazole (PROTONIX) 40 MG EC tablet Take 40 mg by mouth 2 times daily 10/6/2021 at AM Yes Reported, Patient   hydrochlorothiazide (HYDRODIURIL) 12.5 MG tablet Take 1 tablet (12.5 mg) by mouth daily   Joan Weber MD       The information provided in this note is only as accurate as the sources available at the time of update(s)

## 2021-10-07 NOTE — PROGRESS NOTES
Care Coordination:    Asked by PA to make Follow-up appointment.  Pt has no PCP and needs to establish with one.   CC met with patient and she would like to be seen at [facility]  Following appointment made and added to AVS:  Oct 14, 2021  9:30 AM   Office Visit with Aye Wong PA-C   [facility] [address], [address]   [facility] [address] [phone]     PA would also like Follow-up with [facility].  They need to assess patient prior to seeing.   They will contact her to schedule appointment. Information added to AVS.  Pt voices no other needs for discharge.   Pt to discharge today vs tomorrow depending on labs.  Bedside nurse to review AVS.      Millie Patel RN BSN  Inpatient Care Coordination  [facility]  [phone]

## 2021-10-07 NOTE — PROGRESS NOTES
Pt received 1UPRBCs today, unable to scan/doccument in chart. Blood transfusion record downtime completed. Pt tolerated blood transfusion.

## 2021-10-07 NOTE — UTILIZATION REVIEW
Concurrent stay review; Secondary Review Determination    Under the authority of the Utilization Management Committee, the utilization review process indicated a secondary review on the above patient. The review outcome is based on review of the medical records, discussions with staff, and applying clinical experience noted on the date of the review.    (x) Observation Status Appropriate - Concurrent stay review        RATIONALE FOR DETERMINATION: 61-year-old female with history of esophageal stricture with multiple EGDs during the past 5 months with most recently noted to have a large circumferential ulcer at the base of the esophagus.  While attending a preop evaluation for bilateral blephoplasty, patient was otherwise asymptomatic but found to have severe anemia with a hemoglobin 5.3 consistent with a severe iron deficiency anemia.  Due to the concern for potential active bleeding patient admitted to the hospital for evaluation and management.  Observation care appropriate to rule out active bleeding while initiating transfusions and iron replacement.  If patient found to have ongoing active bleeding, then at that time patient would be appropriate to advance to inpatient care.    Patient is clinically improving and there is no clear indication to change patient's status to inpatient. The severity of illness, intensity of service provided, expected LOS and risk for adverse outcome make the care appropriate for observation.    This document was produced using voice recognition software    The information on this document is developed by the utilization review team in order for the business office to ensure compliance. This only denotes the appropriateness of proper admission status and does not reflect the quality of care rendered.    The definitions of Inpatient Status and Observation Status used in making the determination above are those provided in the CMS Coverage Manual, Chapter 1 and Chapter 6, section  70.4.    Sincerely,    Tae Lunsford MD  Utilization Review  Physician Advisor  [facility].

## 2021-10-07 NOTE — CONSULTS
"[facility] Nurse Inpatient Wound Assessment     Reason for consultation: Evaluate and treat \"left lower extremity chronic/recurrent wound\"     Attempted to meet with patient in person for consult, unable due to patient off unit at  for procedure. Will re-attempt consult at a later time/date.       Asya TAN    "

## 2021-10-07 NOTE — ED NOTES
[facility]  ED Nurse Handoff Report    ED Chief complaint: Abnormal Labs      ED Diagnosis:   Final diagnoses:   Anemia, unspecified type       Code Status: as per hospitalist.    Allergies:   Allergies   Allergen Reactions     No Known Allergies      Gadolinium Rash       Patient Story: pt was seen at her clinic today, her hgd was 5.3 so she was referred to ED. Pt c/o tiredness. Hx. Of GERD, HTN. Patient had an upper endoscopy back in May of this year and was told she had a stenosis of her esophagus but no bleeding.     Focused Assessment:  A&Ox4, ambulates with crutches, respirations even and unlabored. Skin dry, warm, pale. Pt received 2 units or RBC, hgb increased to 6.2 only.   Results for orders placed or performed during the hospital encounter of 10/06/21   Basic metabolic panel     Status: Abnormal   Result Value Ref Range    Sodium 138 133 - 144 mmol/L    Potassium 3.5 3.4 - 5.3 mmol/L    Chloride 108 94 - 109 mmol/L    Carbon Dioxide (CO2) 22 20 - 32 mmol/L    Anion Gap 8 3 - 14 mmol/L    Urea Nitrogen 11 7 - 30 mg/dL    Creatinine 0.56 0.52 - 1.04 mg/dL    Calcium 8.5 8.5 - 10.1 mg/dL    Glucose 123 (H) 70 - 99 mg/dL    GFR Estimate >90 >60 mL/min/1.73m2   CBC with platelets and differential     Status: Abnormal   Result Value Ref Range    WBC Count 6.5 4.0 - 11.0 10e3/uL    RBC Count 3.31 (L) 3.80 - 5.20 10e6/uL    Hemoglobin 5.0 (LL) 11.7 - 15.7 g/dL    Hematocrit 20.4 (L) 35.0 - 47.0 %    MCV 62 (L) 78 - 100 fL    MCH 15.1 (L) 26.5 - 33.0 pg    MCHC 24.5 (L) 31.5 - 36.5 g/dL    RDW 21.4 (H) 10.0 - 15.0 %    Platelet Count 400 150 - 450 10e3/uL    % Neutrophils 65 %    % Lymphocytes 23 %    % Monocytes 8 %    % Eosinophils 2 %    % Basophils 1 %    % Immature Granulocytes 1 %    NRBCs per 100 WBC 1 (H) <1 /100    Absolute Neutrophils 4.3 1.6 - 8.3 10e3/uL    Absolute Lymphocytes 1.5 0.8 - 5.3 10e3/uL    Absolute Monocytes 0.5 0.0 - 1.3 10e3/uL    Absolute Eosinophils 0.1 0.0 - 0.7  10e3/uL    Absolute Basophils 0.0 0.0 - 0.2 10e3/uL    Absolute Immature Granulocytes 0.0 <=0.0 10e3/uL    Absolute NRBCs 0.0 10e3/uL   Extra Red Top Tube     Status: None   Result Value Ref Range    Hold Specimen [facility]    Extra Blood Bank Purple Top Tube     Status: None   Result Value Ref Range    Hold Specimen [facility]    Occult blood stool 1-3 spec     Status: Normal   Result Value Ref Range    Occult Blood Slide 1 Negative Negative   Reticulocyte count     Status: Normal   Result Value Ref Range    % Reticulocyte 1.7 0.5 - 2.0 %    Absolute Reticulocyte 0.054 0.025 - 0.095 10e6/uL   Ferritin     Status: Abnormal   Result Value Ref Range    Ferritin 6 (L) 8 - 252 ng/mL   Iron & Iron Binding Capacity (UU,UR,SH,RH,PH,WY,HI)     Status: Abnormal   Result Value Ref Range    Iron 16 (L) 35 - 180 ug/dL    Iron Binding Capacity 449 (H) 240 - 430 ug/dL    Iron Sat Index 4 (L) 15 - 46 %   Lactate Dehydrogenase     Status: Abnormal   Result Value Ref Range    Lactate Dehydrogenase 262 (H) 81 - 234 U/L   Hemoglobin     Status: Abnormal   Result Value Ref Range    Hemoglobin 6.2 (LL) 11.7 - 15.7 g/dL   Adult Type and Screen     Status: None   Result Value Ref Range    ABO/RH(D) O POS     Antibody Screen Negative Negative    SPECIMEN EXPIRATION DATE [id number]    Prepare red blood cells (unit)     Status: None (Preliminary result)   Result Value Ref Range    CROSSMATCH Compatible     UNIT ABO/RH O Pos     Unit Number [id number]     Unit Status Issued     Blood Component Type Red Blood Cells     Product Code Q1593J34     CODING SYSTEM [id number]     UNIT TYPE ISBT 5100     ISSUE DATE AND TIME [id number]    Prepare red blood cells (unit)     Status: None (Preliminary result)   Result Value Ref Range    CROSSMATCH Compatible     UNIT ABO/RH O Pos     Unit Number [id number]     Unit Status Issued     Blood Component Type Red Blood Cells     Product Code P1186I03     CODING SYSTEM [id number]     UNIT TYPE ISBT 5100     ISSUE  DATE AND TIME [id number]    CBC with platelets + differential     Status: Abnormal    Narrative    The following orders were created for panel order CBC with platelets + differential.  Procedure                               Abnormality         Status                     ---------                               -----------         ------                     CBC with platelets and d...[[id number]]  Abnormal            Final result                 Please view results for these tests on the individual orders.   ABO/Rh type and screen     Status: None    Narrative    The following orders were created for panel order ABO/Rh type and screen.  Procedure                               Abnormality         Status                     ---------                               -----------         ------                     Adult Type and Screen[[id number]]                            Edited Result - FINAL        Please view results for these tests on the individual orders.   [place] Draw     Status: None    Narrative    The following orders were created for panel order [place] Draw.  Procedure                               Abnormality         Status                     ---------                               -----------         ------                     Extra Red Top Tube[[id number]]                               Final result               Extra Blood Bank Purple ...[[id number]]                      Final result                 Please view results for these tests on the individual orders.       Treatments and/or interventions provided: monitoring, blood transfusion.   Patient's response to treatments and/or interventions: hgb from 5.3 to 6.2.     To be done/followed up on inpatient unit:  continue with POC    Does this patient have any cognitive concerns?: n/a    Activity level - Baseline/Home:  Independent  Activity Level - Current:   Independent    Patient's Preferred language: English   Needed?: No    Isolation:  None  Infection: Not Applicable  Patient tested for COVID 19 prior to admission: YES  Bariatric?: No    Vital Signs:   Vitals:    10/06/21 2300 10/06/21 2315 10/06/21 2330 10/07/21 0000   BP: (!) 146/85 (!) 154/80 (!) 160/84 (!) 161/82   Pulse: 81 85 82 82   Resp:  13 16    Temp: 98.9  F (37.2  C) 98.5  F (36.9  C)     TempSrc:       SpO2: 97% 99% 98% 98%       Cardiac Rhythm:     Was the PSS-3 completed:   Yes  What interventions are required if any?    Family Comments: pt by herself in ED  OBS brochure/video discussed/provided to patient/family: N/A            For the majority of the shift this patient's behavior was Green.   Behavioral interventions performed were n/a.    ED NURSE PHONE NUMBER: [phone]

## 2021-10-07 NOTE — PROGRESS NOTES
RECEIVING UNIT ED HANDOFF REVIEW    ED Nurse Handoff Report was reviewed by: Diana Brady RN on October 7, 2021 at 7:12 AM

## 2021-10-07 NOTE — H&P
[facility]    History and Physical - Hospitalist Service       Date of Admission:  10/6/2021    Assessment & Plan      Vandana Gonzalez is a 61 year old female admitted on 10/6/2021. She presents with abnormal labs.       Acute Blood Loss Anemia    Assessment: Presents with a critically low hemoglobin level of 5.3 while at clinic today.  Patient's only major complaint at this time is generalized fatigue.  She is overall nontoxic-appearing.  2 units of RBCs was ordered in the emergency department.  She had endoscopy roughly 9 days ago with Dr. Chavez.     Plan:   -Admit observation  -Transfuse additional 1 unit of RBCs  -Gastroenterology consulted  -Keep n.p.o.  -IV fluids  -Follow hemoglobin  -Avoid NSAIDs/nephrotoxins      Esophageal reflux    Assessment/Plan: Continue PTA Protonix      Hypertensive disorder    Assessment/Plan: Resume prior to admission hydrochlorothiazide at discharge         Diet: NPO for Medical/Clinical Reasons Except for: Ice Chips    DVT Prophylaxis: Pneumatic Compression Devices  Lemons Catheter: Not present  Central Lines: None  Code Status: Full Code      Clinically Significant Risk Factors Present on Admission                   Disposition Plan   Expected discharge:  tomorrow recommended to prior living arrangement once hemoglobin stable and GI work up completed.     The patient's care was discussed with the Patient and ED Provider.    Lucas Elias MD  [facility]  Securely message with the [employer] Web Console (learn more here)  Text page via [employer] Paging/Directory      ______________________________________________________________________    Chief Complaint     Abnormal Labs    History is obtained from the patient    History of Present Illness     Vandana Gonzalez is a 61 year old female with past medical history of GERD/hypertension who presents for evaluation of abnormal labs.    Patient has had 2 endoscopies since May, with the most recent one  about 9 days prior to mission.  She was at her primary care clinic today and was having preoperative labs drawn for her upcoming blepharoplasty procedure when she was told that she had a critically low hemoglobin level of 5.2, and was referred to the emergency department for further evaluation.  She otherwise denies any significant weakness, but she does feel some degree of fatigue.  She denies any chest pain or shortness of breath.  She denies any fevers or chills, she denies any blood in her stool, no significant vaginal bleeding.  She denies any history of colon cancer.  She denies any recent significant NSAIDs/steroid medication use.  She denies any history of peptic ulcer disease.  She denies any calf pain or leg swelling.  At this time she has notes.    Review of Systems      The 10 point Review of Systems is negative other than noted in the HPI or here.     Past Medical History    I have reviewed this patient's medical history and updated it with pertinent information if needed.   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  Past Surgical History:   Procedure Laterality Date     BIOPSY OF BREAST, INCISIONAL  7-09    phyllodes tumor left     BREAST SURGERY  7-2009     C LIGATN FEMORAL VEIN      multiple vein strippings left     FEMUR SURGERY      2002 left side      ORTHOPEDIC SURGERY      repair broken femur     [facility] UGI ENDOSCOPY, SIMPLE EXAM  4-2002    esophageal stricture       Social History   I have  reviewed this patient's social history and updated it with pertinent information if needed.  Social History     Tobacco Use     Smoking status: Former Smoker     Packs/day: 0.25     Years: 2.00     Pack years: 0.50     Types: Cigarettes     Start date: 10/1/1990     Quit date: 10/1/1992     Years since quittin.0     Smokeless tobacco: Never Used     Tobacco comment: Quit    Substance Use Topics     Alcohol use: Yes     Alcohol/week: 0.0 standard drinks     Comment: couple beers per day     Drug use: No       Family History   I have reviewed this patient's family history and updated it with pertinent information if needed.  Family History   Problem Relation Age of Onset     Cancer - colorectal Father      Diabetes Brother        Prior to Admission Medications   Prior to Admission Medications   Prescriptions Last Dose Informant Patient Reported? Taking?   hydrochlorothiazide (HYDRODIURIL) 12.5 MG tablet   No No   Sig: Take 1 tablet (12.5 mg) by mouth daily   pantoprazole (PROTONIX) 40 MG EC tablet   Yes No   Sig: Take 40 mg by mouth 2 times daily      Facility-Administered Medications: None     Allergies   Allergies   Allergen Reactions     No Known Allergies      Gadolinium Rash       Physical Exam   Vital Signs: Temp: 98.5  F (36.9  C) Temp src: Oral BP: 138/77 Pulse: 74   Resp: 16 SpO2: 96 % O2 Device: None (Room air)    Weight: 0 lbs 0 oz    Constitutional: awake, alert, cooperative, no apparent distress.   Eyes: Lids and lashes normal, pupils equal, round and reactive to light   ENT: Normocephalic, without obvious abnormality, atraumatic, sinuses nontender on palpation   Hematologic / Lymphatic: no cervical lymphadenopathy   Respiratory: CTABL   Cardiovascular: RRR with no m/r/g   GI: Normal bowel sounds, soft, non-distended, non-tender.   Skin: normal skin color, texture, turgor   Musculoskeletal: There is no redness, warmth, or swelling of the joints. Full range of motion noted.   Neurologic: Awake,  alert, oriented to name, place and time. Cranial nerves II-XII are grossly intact. Motor is 5 out of 5 bilaterally. Sensory is intact.   Neuropsychiatric: normal mood and affect    Data   Data reviewed today: I reviewed all medications, new labs and imaging results over the last 24 hours. I personally reviewed no images or EKG's today.    Most Recent 3 CBC's:Recent Labs   Lab Test 10/06/21  2344 10/06/21  1722 10/06/21  1407   WBC  --  6.5 5.6   HGB 6.2* 5.0* 5.3*   MCV  --  62* 61*   PLT  --  400 420     Most Recent 3 BMP's:Recent Labs   Lab Test 10/06/21  1722 07/02/21  1040 10/20/20  1646    137 139   POTASSIUM 3.5 3.7 3.7   CHLORIDE 108 107 109   CO2 22 23 25   BUN 11 7 8   CR 0.56 0.55 0.45*   ANIONGAP 8 7 5   MATTEO 8.5 8.3* 8.9   * 97 105*     Most Recent 2 LFT's:No lab results found.  Most Recent 3 INR's:No lab results found.  No results found for this or any previous visit (from the past 24 hour(s)).

## 2021-10-07 NOTE — PROGRESS NOTES
[facility]    Hospitalist Progress Note    Assessment & Plan   Vandana Gonzalez is a 61 year old female who was admitted on 10/6/2021.     Past medical history significant for HTN, GERD, Klippel Trenaunay disease (AVM of left lower extremity), History of recurrent left lower extremity stasis ulceration who was registered to observation due ot suspected acute blood loss anemia.      Patient was seen in clinic 10/6/2021 and was informed of a critical lab value (low HGB of 5.3) and was directed to go to the ED.  Notably, the patient has undergone several EGDs the last occurring about 9-10 days ago with Dr. Chavez.  While in the ED, patient complained of feeling unwell and mainly being very fatigued.      Work-up in the ED included a BMP with a creatinine of 0.56 with GFR > 90 and glucose of 123 otherwise within normal limits.  CBC with differential with a HGB of 5.0, Hematocrit of 20.4, RBC of 3.31, MCV of 62, MCH of 15.1, MCHC of 24.5 and RDW of 21.4.  Blood type and screen was completed and patient was transfused 2 unit of PRBC.  Iron studies were completed and showed iron level of 16, ferritin of 6, iron binding capacity of 449, iron saturation of 4.  LDH was mildly elevated at 262.  Absolute Retic of 0.054 and % Retic of 1.7.    After transfusion HGB was checked again and still low at 6.2.  BMP and CBC with platelets were rechecked prior to going to the Obs unit 10/7.  BMP is unremarkable except for calcium of 8.4 and glucose of 106.  CBC with HGB of 6.1, Hematocrit of 22.8, RBC of 3.49, MCV of 65, MCH of 17.5, MCHC of 26.8 and RDW of 25.1.  An INR was also checked at at 1.12.      Acute blood loss anemia  Patient will have received 3 units of PRBC.    - GI consult appreciated:   --EGD completed with noted esophageal stenosis and 4 esophageal ulcers (non-bleeding).   --Mechanical soft diet.     --Sucralfate suspension 1 g QID.     --Protonix 40 mg BID.     --Will follow up as an outpatient with  planned colonoscopy.    - Mechanical soft diet.    - IV Fluids.    - Trend HGB.    - Conditional transfusion order placed for HGB < 7.0.    - Will plan for 1x dose of IV Venofer; will administer tomorrow.    Recent Labs   Lab 10/07/21  1534 10/07/21  0554 10/06/21  2344 10/06/21  1722 10/06/21  1407   HGB 6.6* 6.1* 6.2* 5.0* 5.3*       GERD  - Resumed on PTA Protonix.      HTN  - Hold PTA hydrochlorothiazide and plan to resume at discharge.      Klippel Trenaunay disease (AVM of left lower extremity with recurrent wound)  Chronic left lower extremity edema  Previously seen at [facility] for wound care.    - WOCN consult appreciated:   LLE wound every three to 4 days:   Cleanse wound with saline, sterile water or wound cleanser   Pat dry   Apply alcohol free skin prep to surrounding skin   If you choose to use a topical therapy consider a light layer of medical grade honey  (cvs/amazon) or plurogel (medline)   Apply thin silicone dressing (equivalent to mepilex lite)    Pad to your comfort level   Wear compression   Consider using a lymphedema pump twice daily starting with 10 -15 minutes to start  and work up to what is prescribed by lymphedema therapy  - [facility] the patient to schedule an outpatient appointment.      Clinically Significant Risk Factors Present on Admission                     Diet: Mechanical/Dental Soft Diet     DVT Prophylaxis: Low Risk/Ambulatory with no VTE prophylaxis indicated   Lemons Catheter: Not present  Code Status: Full Code     Disposition Plan    Expected discharge: Today; recommended to prior living arrangement once hemoglobin stable.   Entered: Chuy Macias PA-C 10/07/2021, 3:59 PM        The patient's care was discussed with the Bedside Nurse, Care Coordinator/ and Patient.      The patient has been discussed with Dr. Argueta, who agrees with the assessment and plan at this time.    Chuy Macias PA-C  [facility]  [facility]  Securely message with the [employer] Web Console (learn more here)  Text page via [employer] Paging/Directory      Interval History   Patient was resting in bed upon arrival.  She underwent an EGD performed by Dr. Chavez.  Patient denied fever, chills, chest pain, shortness of breath or abdominal pain.  She has a chronic non-healing left leg wound and chronic lymphedema of her left leg.      We discussed EGD findings briefly and overall plan.  Initially talked about leaving tonight as long as hemoglobin was stable.  We also discussed medication management and plans for following up with Dr. Chavez.  She has no PCP but will be working with CC/SW to get established with one and will also work on getting her into wound clinic.      Returned to patient's room this afternoon and reviewed HGB result of 6.6 and plan for transfusion and continued monitoring overnight.      -Data reviewed today: I reviewed all new labs and imaging results over the last 24 hours. I personally reviewed no images or EKG's today.    Physical Exam   Temp: (!) (P) 96.6  F (35.9  C) Temp src: Oral BP: (!) (P) 142/70 Pulse: (P) 78   Resp: (!) 31 SpO2: (P) 99 % O2 Device: (P) None (Room air)    Vital Signs with Ranges  Temp:  [97.2  F (36.2  C)-100  F (37.8  C)] 98.5  F (36.9  C)  Pulse:  [] 77  Resp:  [12-16] 16  BP: (138-167)/(74-86) 142/82  SpO2:  [96 %-100 %] 97 %  I/O last 3 completed shifts:  In: 661   Out: -       Constitutional: Awake, alert, cooperative, no apparent distress.    ENT: Normocephalic, without obvious abnormality, atraumatic, oral pharynx with moist mucus membranes, tonsils without erythema or exudates.  Neck: Supple, symmetrical, trachea midline, no adenopathy.  Pulmonary: No increased work of breathing, good air exchange, clear to auscultation bilaterally, no crackles or wheezing.  Cardiovascular: Regular rate and rhythm, normal S1 and S2, no S3 or S4, and no murmur noted.  GI: Normal bowel sounds, soft,  non-distended, non-tender.  Skin/Integumen: Visble skin appears clear.    Neuro: CN II-XII grossly intact.  Psych:  Alert and oriented x 3. Normal affect.  Extremities: Left lower extremity edema noted 2-3+ up past the knee, and calves are non-TTP bilaterally.       Medications     lactated ringers 100 mL/hr at 10/07/21 0553       pantoprazole  40 mg Oral BID       Data   Recent Labs   Lab 10/07/21  1534 10/07/21  0554 10/06/21  2344 10/06/21  1722 10/06/21  1722 10/06/21  1407 10/06/21  1407   WBC  --  4.1  --   --  6.5  --  5.6   HGB 6.6* 6.1* 6.2*   < > 5.0*   < > 5.3*   MCV  --  65*  --   --  62*  --  61*   PLT  --  326  --   --  400  --  420   INR  --  1.12  --   --   --   --   --    NA  --  140  --   --  138  --  137   POTASSIUM  --  3.4  --   --  3.5  --  4.2   CHLORIDE  --  109  --   --  108  --  109   CO2  --  26  --   --  22  --  19*   BUN  --  7  --   --  11  --  10   CR  --  0.52  --   --  0.56  --  0.53   ANIONGAP  --  5  --   --  8  --  9   MATTEO  --  8.4*  --   --  8.5  --  8.7   GLC  --  106*  --   --  123*  --  97    < > = values in this interval not displayed.       No results found for this or any previous visit (from the past 24 hour(s)).

## 2021-10-07 NOTE — CONSULTS
[facility]  Gastroenterology Consultation         Vandana Gonzalez  [address] [id number]  61 year old female    Admission Date/Time: 10/6/2021  Primary Care Provider: No Ref-Primary, Physician  Referring / Attending Physician:  Dr. Lucas Elias     We were asked to see the patient in consultation by Dr. Lucas Elias for evaluation of acute blood loss anemia.      CC: anemia    HPI:  Vandana Gonzalez is a 61 year old female who has a PMHx of HTN, GERD, esophageal stricture and chronic left lower leg wound. She was seen for routine pre operative visit yesterday. Incidentally noted hemoglobin was 5.3. Patient sent to ED for further evaluation. She had undergone and EGD 10 days ago for esophageal dilation. She undergoes these every 2-3 months. She has a large circumferential ulcer at base of esophagus measuring 5 cm. She take daily pantoprazole, avoids NSAIDs. Has been asymptomatic without melena, nausea, vomiting, hematemesis, chest or abdominal pain. Has no hematochezia. Denies shortness of breath, palpitations, fever or chills. She denies any visible blood loss.    VSS. Afebrile. Labs significant for hemoglobin 5.0 ->6.2 after a unit, received a second unit of PRBC->6.2. Third unit ordered. WBC 4.1. BMP unremarkable. COVID 19 negative.    ROS: A comprehensive ten point review of systems was negative aside from those in mentioned in the HPI.      PAST MED HX:  I have reviewed this patient's medical history and updated it with pertinent information if needed.   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 Taking?   hydrochlorothiazide (HYDRODIURIL) 12.5 MG tablet   No No   Sig: Take 1 tablet (12.5 mg) by mouth daily   pantoprazole (PROTONIX) 40 MG EC tablet   Yes No   Sig: Take 40 mg by mouth 2 times daily      Facility-Administered Medications: None       ALLERGIES:   Allergies   Allergen Reactions     No Known Allergies      Gadolinium Rash       SOCIAL HISTORY:  Social History     Tobacco Use     Smoking status: Former Smoker     Packs/day: 0.25     Years: 2.00     Pack years: 0.50     Types: Cigarettes     Start date: 10/1/1990     Quit date: 10/1/1992     Years since quittin.0     Smokeless tobacco: Never Used     Tobacco comment: Quit    Substance Use Topics     Alcohol use: Yes     Alcohol/week: 0.0 standard drinks     Comment: couple beers per day     Drug use: No       FAMILY HISTORY:  Family History   Problem Relation Age of Onset     Cancer - colorectal Father      Diabetes Brother        PHYSICAL EXAM:   General  Alert, oriented and comfortable  Vital Signs with Ranges  Temp: 97.1  F (36.2  C) Temp src: Oral BP: (!) 171/81 Pulse: 103   Resp: 16 SpO2: 97 % O2 Device: None (Room air)    I/O last 3 completed shifts:  In: 661   Out: -     Constitutional: healthy, alert and no distress, pale  Cardiovascular: negative, PMI normal. No lifts, heaves, or thrills. RRR. No murmurs, clicks gallops or rub  Respiratory: negative, Percussion normal. Good diaphragmatic excursion. Lungs clear  Abdomen: Abdomen soft, non-tender. BS normal. No masses, organomegaly          ADDITIONAL COMMENTS:   I reviewed the patient's new clinical lab test results.   Recent Labs   Lab Test 10/07/21  0554 10/06/21  2344 10/06/21  1722 10/06/21  1407 10/06/21  1407   WBC 4.1  --  6.5  --  5.6   HGB 6.1* 6.2* 5.0*   < > 5.3*   MCV 65*  --  62*  --  61*     --  400  --  420   INR 1.12  --   --   --   --     < > = values in  this interval not displayed.     Recent Labs   Lab Test 10/07/21  0554 10/06/21  1722 07/02/21  1040   POTASSIUM 3.4 3.5 3.7   CHLORIDE 109 108 107   CO2 26 22 23   BUN 7 11 7   ANIONGAP 5 8 7     No lab results found.    Invalid input(s): ALKDEVAN    I reviewed the patient's new imaging results.        CONSULTATION ASSESSMENT AND PLAN:   Vandana Gonzalez is a 61 year old with h/o GERD, esophageal stenosis, esophagitis and presented with hemoglobin of 5.3. GI consulted on 10/7/21 with concerns for GI bleed.    Principal Problem:  Anemia, unspecified type  GERD  Esophageal stenosis  Hemoglobin 5.0->6.2->6.1. Baseline unsure, no record, 2012 was 10-11  Has no h/o prior colonoscopy  Has large 5 cm esophageal ulceration and EGD done 10 days ago noted this with chronic esophageal stenosis and dilated.  Concern for bleed from esophageal ulceration vs possible lower GI bleed.  ALso has lower leg wound- possible blood loss??    -- Plan EGD this a.m.  -- Serial hemoglobin q 6 hours and transfuse for hemoglobin less than 7  -- pantoprazole 40 mg BID  -- NPO  -- Consider colonoscopy if no source of blood loss on EGD      [facility].  Office: [phone]  Cell : [phone] (Dr. Chavez)  Cell: [phone] (Gwen Bedolla PA-C)

## 2021-10-07 NOTE — ED PROVIDER NOTES
Hgb continued to be low.  Pt admitted to hospitalist service for further work up and treatment.     Omid Harper MD  10/07/21 0619

## 2021-10-07 NOTE — CONSULTS
[facility] Nurse Inpatient Wound Assessment   Reason for consultation: Evaluate and treat LLE lymphedema wounds    Assessment  LLE wounds due to Venous Ulcer and has had surgery on the same leg for broken femur and also many vein stripping procedures.   Status: initial assessment  Chronic and just refuses to close, not overly sensitive number. She does not currently use a lymphedema pump. Needs new compression stocking   Treatment Plan  LLE wound every three to 4 days   Cleanse wound with saline, sterile water or wound cleanser  Pat dry  Apply alcohol free skin prep to surrounding skin  If you choose to use a topical therapy consider a light layer of medical grade honey (cvs/amazon) or plurogel (medline)  Apply thin silicone dressing (equivalent to mepilex lite)   Pad to your comfort level  Wear compression  Consider using a lymphedema pump twice daily starting with 10 -15 minutes to start and work up to what is prescribed by lymphedema therapy      Orders Written  Recommended provider order: Lymphedema consult and already has a referrel to see the I.   WO Nurse follow-up plan:weekly  Nursing to notify the Provider(s) and re-consult the [facility] Nurse if wound(s) deteriorates or new skin concern.    Patient HistoryAcute Blood Loss Anemia     According to provider note(s):   Assessment: Presents with a critically low hemoglobin level of 5.3 while at clinic today.  Patient's only major complaint at this time is generalized fatigue.  She is overall nontoxic-appearing.  2 units of RBCs was ordered in the emergency department.  She had endoscopy roughly 9 days ago with Dr. Chavez.     Objective Data  Containment of urine/stool: Continent of bladder and Continent of bowel    Active Diet Order  Orders Placed This Encounter      Mechanical/Dental Soft Diet      Output:   I/O last 3 completed shifts:  In: 661   Out: -     Risk Assessment:   Sensory Perception: 4-->no impairment  Moisture: 4-->rarely moist  Activity: 4-->walks  frequently  Mobility: 3-->slightly limited  Nutrition: 3-->adequate  Friction and Shear: 3-->no apparent problem  Ruben Score: 21                          Labs:   Recent Labs   Lab 10/07/21  0554   HGB 6.1*   INR 1.12   WBC 4.1       Physical Exam  Areas of skin assessed: focused LLE     Wound Location:  Left lower leg  Date of last photo none taken   Wound History: chronic lymphedema - currently has thigh high compression stocking on, is due to get a new one as it rolls down  Wound Base: 100 % epidermis several areas that look like skin tears but the wound bed is dry     Palpation of the wound bed: normal      Drainage: small     Description of drainage: yellow     Measurements (length x width x depth, in cm) 5  x 5  x  0.1 cm      Tunneling N/A     Undermining N/A  Periwound skin: dry/scaly and edematous      Color: purple      Temperature: normal   Odor: none  Pain: denies , none      Interventions  Visual inspection and assessment completed today  Wound Care Rationale Promote moist wound healing without tissue dehydration  and Decrease bacterial load  Wound Care: completed by myself and the patient  Supplies: floor stock, discussed with RN and discussed with patient  Current off-loading measures: Pillows under calves  Current support surface: Standard  Atmos Air mattress  Education provided to: plan of care and insurance does not pay for wound care and has been paying out of pocket. showed her what other equivalents she can resource  Discussed plan of care with Patient and Nurse    Lashawn Ortiz RN BS CWOCN

## 2021-10-07 NOTE — TELEPHONE ENCOUNTER
[facility] hospital called as hospitalist wants patient set up here at Wound Care for her leg wound.   Patient is leaving today or tomorrow.   Referred by Chuy TUBBS.   Call patient at .   Explained we are 4 to 5 weeks out and that is ok.

## 2021-10-08 VITALS
TEMPERATURE: 97.9 F | OXYGEN SATURATION: 100 % | BODY MASS INDEX: 29.2 KG/M2 | DIASTOLIC BLOOD PRESSURE: 79 MMHG | WEIGHT: 197.75 LBS | RESPIRATION RATE: 16 BRPM | HEART RATE: 81 BPM | SYSTOLIC BLOOD PRESSURE: 153 MMHG

## 2021-10-08 LAB
HGB BLD-MCNC: 7.3 G/DL (ref 11.7–15.7)
HGB BLD-MCNC: 7.8 G/DL (ref 11.7–15.7)

## 2021-10-08 PROCEDURE — G0378 HOSPITAL OBSERVATION PER HR: HCPCS

## 2021-10-08 PROCEDURE — 250N000011 HC RX IP 250 OP 636: Performed by: PHYSICIAN ASSISTANT

## 2021-10-08 PROCEDURE — 99217 PR OBSERVATION CARE DISCHARGE: CPT | Performed by: PHYSICIAN ASSISTANT

## 2021-10-08 PROCEDURE — 85018 HEMOGLOBIN: CPT | Performed by: PHYSICIAN ASSISTANT

## 2021-10-08 PROCEDURE — 36415 COLL VENOUS BLD VENIPUNCTURE: CPT | Performed by: PHYSICIAN ASSISTANT

## 2021-10-08 PROCEDURE — 258N000003 HC RX IP 258 OP 636: Performed by: PHYSICIAN ASSISTANT

## 2021-10-08 PROCEDURE — 96374 THER/PROPH/DIAG INJ IV PUSH: CPT

## 2021-10-08 PROCEDURE — 250N000013 HC RX MED GY IP 250 OP 250 PS 637: Performed by: INTERNAL MEDICINE

## 2021-10-08 RX ORDER — PANTOPRAZOLE SODIUM 40 MG/1
40 TABLET, DELAYED RELEASE ORAL 2 TIMES DAILY
Qty: 60 TABLET | Refills: 0 | Status: SHIPPED | OUTPATIENT
Start: 2021-10-08

## 2021-10-08 RX ORDER — SUCRALFATE ORAL 1 G/10ML
1 SUSPENSION ORAL
Qty: 420 ML | Refills: 1 | Status: SHIPPED | OUTPATIENT
Start: 2021-10-08 | End: 2022-02-28

## 2021-10-08 RX ADMIN — SUCRALFATE ORAL 1 G: 1 SUSPENSION ORAL at 11:54

## 2021-10-08 RX ADMIN — IRON SUCROSE 300 MG: 20 INJECTION, SOLUTION INTRAVENOUS at 08:35

## 2021-10-08 RX ADMIN — PANTOPRAZOLE SODIUM 40 MG: 40 TABLET, DELAYED RELEASE ORAL at 08:41

## 2021-10-08 RX ADMIN — SUCRALFATE ORAL 1 G: 1 SUSPENSION ORAL at 08:41

## 2021-10-08 NOTE — PROGRESS NOTES
Observation goals PRIOR TO DISCHARGE     Comments: -diagnostic tests and consults completed and resulted: Not Met  -vital signs normal or at patient baseline: Met  -tolerating oral intake to maintain hydration Met  -returns to baseline functional status: Not met  -safe disposition plan has been identified: Not met  Nurse to notify provider when observation goals have been met and patient is ready for discharge.

## 2021-10-08 NOTE — PROGRESS NOTES
[facility]  Gastroenterology Progress Note     Vandana Gonzalez MRN# [id number]   YOB: 1960 Age: 61 year old          Assessment and Plan:   Vandana Gonzalez is a 61 year old with h/o GERD, esophageal stenosis, esophagitis and presented with hemoglobin of 5.3. GI consulted on 10/7/21 with concerns for GI bleed.     Principal Problem:  Anemia, unspecified type  GERD  Esophageal stenosis  Hemoglobin stable around mid 7. Baseline unsure, no record, 2012 was 10-11  Has no h/o prior colonoscopy  Has large 5 cm esophageal ulceration and EGD done 10 days ago noted this with chronic esophageal stenosis and dilated.  10/7 EGD noted benign esophageal stenosis 7 cm in length, 4 cratered non bleeding esophageal ulcers , largest measuring 20 mm  ALso has lower leg wound- possible blood loss??     -- Serial hemoglobin q 6 hours and transfuse for hemoglobin less than 7  -- pantoprazole 40 mg BID  -- Agree with Carafate 1 g QID  -- Mechanical soft diet  -- Colonoscopy outpatient  -- Ok with GI to discharge patient with plans for follow up in 1- 2 weeks            Interval History:   no new complaints, doing well, denies chest pain, denies shortness of breath, denies abdominal pain, alert, oriented to person, place and time, has had a bowel movement in the last 24 hours and doing well; no cp, sob, n/v/d, or abd pain.              Review of Systems:   C: NEGATIVE for fever, chills, change in weight  E/M: NEGATIVE for ear, mouth and throat problems  R: NEGATIVE for significant cough or SOB  CV: NEGATIVE for chest pain, palpitations or peripheral edema             Medications:   I have reviewed this patient's current medications    pantoprazole  40 mg Oral BID AC     sodium chloride (PF)  3 mL Intracatheter Q8H     sucralfate  1 g Oral 4x Daily AC & HS                  Physical Exam:   Vitals were reviewed  Vital Signs with Ranges  Temp:  [96.6  F (35.9  C)-98.7  F (37.1  C)] 97.9  F (36.6  C)  Pulse:   [77-89] 81  Resp:  [14-31] 16  BP: (137-158)/(70-85) 153/79  SpO2:  [94 %-100 %] 100 %  I/O last 3 completed shifts:  In: 250   Out: -   Constitutional: healthy, alert and no distress   Cardiovascular: negative, PMI normal. No lifts, heaves, or thrills. RRR. No murmurs, clicks gallops or rub  Respiratory: negative, Percussion normal. Good diaphragmatic excursion. Lungs clear  Abdomen: Abdomen soft, non-tender. BS normal. No masses, organomegaly           Data:   I reviewed the patient's new clinical lab test results.   Recent Labs   Lab Test 10/08/21  0513 10/07/21  2332 10/07/21  1534 10/07/21  0554 10/07/21  0554 10/06/21  2344 10/06/21  1722 10/06/21  1407 10/06/21  1407   WBC  --   --   --   --  4.1  --  6.5  --  5.6   HGB 7.3* 7.4* 6.6*   < > 6.1*   < > 5.0*   < > 5.3*   MCV  --   --   --   --  65*  --  62*  --  61*   PLT  --   --   --   --  326  --  400  --  420   INR  --   --   --   --  1.12  --   --   --   --     < > = values in this interval not displayed.     Recent Labs   Lab Test 10/07/21  0554 10/06/21  1722 10/06/21  1407   POTASSIUM 3.4 3.5 4.2   CHLORIDE 109 108 109   CO2 26 22 19*   BUN 7 11 10   ANIONGAP 5 8 9     No lab results found.    Invalid input(s): ALKPHOSPH    I reviewed the patient's new imaging results.    All laboratory data reviewed  All imaging studies reviewed by me.    Gwen Bedolla PA-C,  10/8/2021  [facility]  Office : [phone]  Cell: [phone] (Dr. Chavez)  Cell: [phone] (Gwen Bedolla PA-C)

## 2021-10-08 NOTE — PROGRESS NOTES
Discharge Note    Patient discharged to home via private vehicle  accompanied by significant other .  IV: Discontinued  Prescriptions filled and given to patient/family.   Belongings reviewed and sent with patient.   Home medications returned to patient: NA  Equipment sent with: patient, N/A.   patient verbalizes understanding of discharge instructions. AVS given to patient.

## 2021-10-08 NOTE — PLAN OF CARE
Hgb this AM= 7.3. Noon check= 7.8. Received iron infusion without signs of reaction. VSS on room air. Chronic wound to LLE, ace wrap in place. Denies pain. Up with SBA and crutches. Discharge home this afternoon. Awaiting medications from pharmacy.

## 2021-10-08 NOTE — PROVIDER NOTIFICATION
MD Notification    Notified Person: MD    Notified Person Name: Fletcher Maynard    Notification Date/Time: 10/08/2021 at 0035 am.    Notification Interaction: Page    Purpose of Notification:Hemoglobin check at 0000am came back at 7.4 from 6.6g/dl after the 4th unit . On a Q6hrly hemoglobin check. VS are stable and pt reports she is doing fine.  FYI    Orders Received: None    Comments: Continue monitoring

## 2021-10-08 NOTE — PLAN OF CARE
Pt is A & O X4, SBA and uses crutches at baseline. [facility] soft diet. Vss at RA exp for elevated SBP. 2 PIV. LR running at 75 ml/h. Hb at midnight was 7.4 and 7.3 at 6am. Chronic wound on the LLE. LLE in ace wrap with a 3+ edema. Denies pain, chills, SOB or fatigue. Pending discharge today after iron transfusion.

## 2021-10-08 NOTE — DISCHARGE SUMMARY
[facility]  Hospitalist Discharge Summary     Admit Date:  10/6/2021  Discharge Date:     10/8/2021  Discharging Provider: Chuy Macias PA-C    PRIMARY CARE PROVIDER:    No Ref-Primary, Physician  Has been scheduled with an appointment at [facility] for 10/14/2021.     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     Past medical history significant for HTN, GERD, Klippel Trenaunay disease (AVM of left lower extremity), History of recurrent left lower extremity stasis ulceration who was registered to observation due ot suspected acute blood loss anemia.      Patient was seen in clinic 10/6/2021 and was informed of a critical lab value (low HGB of 5.3) and was directed to go to the ED.  Notably, the patient has undergone several EGDs the last occurring about 9-10 days ago with Dr. Chavez.  While in the ED, patient complained of feeling unwell and mainly being very fatigued.      Work-up in the ED included a BMP with a creatinine of 0.56 with GFR > 90 and glucose of 123 otherwise within normal limits.  CBC with differential with a HGB of 5.0, Hematocrit of 20.4, RBC of 3.31, MCV of 62, MCH of 15.1, MCHC of 24.5 and RDW of 21.4.  Blood type and screen was completed and patient was transfused 2 unit of PRBC.  Iron studies were completed and showed iron level of 16, ferritin of 6, iron binding capacity of 449, iron saturation of 4.  LDH was mildly elevated at 262 with a normal Haptoglobin.  Absolute Retic of 0.054 and % Retic of 1.7.    After transfusion HGB was checked again and still low at 6.2.  BMP and CBC with platelets were rechecked prior to going to the Obs unit 10/7.   BMP is unremarkable except for calcium of 8.4 and glucose of 106.  CBC with HGB of 6.1, Hematocrit of 22.8, RBC of 3.49, MCV of 65, MCH of 17.5, MCHC of 26.8 and RDW of 25.1.  An INR was also checked at at 1.12.      HOSPITAL COURSE:    Anemia; suspected acute on chronic due to blood loss  Iron deficiency   Patient received 3 units of PRBC.    Iron studies were completed in the ED: Iron level of 16, ferritin of 6, iron binding capacity of 449, iron saturation of 4.  Retic (Absolute and %) were within normal limit.    - GI consult appreciated:   --EGD completed with noted esophageal stenosis and 4 esophageal ulcers (non-bleeding).   --Mechanical soft diet.     --Sucralfate suspension 1 g QID.     --Protonix 40 mg BID.     --Iron supplement daily.     --Will follow up as an outpatient with planned colonoscopy.    - Mechanical soft diet.    - IV Fluids.    - HGB monitored.     --Stable on day of discharge has remained > 7.0 throughout the morning.    - Conditional transfusion order placed for HGB < 7.0.    - 1x dose of IV Venofer on day of discharge.    *At discharge patient will be provided a supply of Sucralfate, Iron supplement and Protonix.      Cardiac murmur  Appreciated on day of discharge.  Patient without knowledge of murmur.  Could be due to anemia.    - Advised to discuss at upcoming PCP appointment.      GERD  - Resumed on PTA Protonix 40 mg BID.      HTN  - Hold PTA hydrochlorothiazide and plan to resume at discharge.      Klippel Trenaunay disease (AVM of left lower extremity with recurrent wound)  Chronic left lower extremity edema  Previously seen at [facility] for wound care.    - WOCN consult appreciated:   LLE wound every three to 4 days:   Cleanse wound with saline, sterile water or wound cleanser   Pat dry   Apply alcohol free skin prep to surrounding skin   If you choose to use a topical therapy consider a light layer of medical grade honey  (cvs/amazon) or plurogel (medline)   Apply thin silicone  dressing (equivalent to mepilex lite)    Pad to your comfort level   Wear compression   Consider using a lymphedema pump twice daily starting with 10 -15 minutes to start  and work up to what is prescribed by lymphedema therapy  - [facility] will be contacting the patient to schedule an outpatient appointment.       The patient was discussed with Dr. Argueta who agrees with discharge at this time.       TOTAL DISCHARGE TIME:  Chuy FLORES PA-C, personally saw the patient today and spent less than or equal to 30 minutes discharging this patient.    Chuy Macias PA-C  [facility]  Securely message with the Vocera Web Console (learn more here)  Text page via [facility] Paging/Directory      DISCHARGE MEDICATIONS:       Review of your medicines      START taking      Dose / Directions   ferrous sulfate 325 (65 Fe) MG tablet  Commonly known as: FEROSUL      Dose: 325 mg  Take 1 tablet (325 mg) by mouth daily (with breakfast)  Quantity: 30 tablet  Refills: 0     sucralfate 1 GM/10ML suspension  Commonly known as: CARAFATE      Dose: 1 g  Take 10 mLs (1 g) by mouth 4 times daily (before meals and nightly)  Quantity: 420 mL  Refills: 1        CONTINUE these medicines which have NOT CHANGED      Dose / Directions   hydrochlorothiazide 12.5 MG tablet  Commonly known as: HYDRODIURIL  Used for: Primary hypertension      Dose: 12.5 mg  Take 1 tablet (12.5 mg) by mouth daily  Quantity: 60 tablet  Refills: 3     pantoprazole 40 MG EC tablet  Commonly known as: PROTONIX      Dose: 40 mg  Take 1 tablet (40 mg) by mouth 2 times daily  Quantity: 60 tablet  Refills: 0        STOP taking    ibuprofen 200 MG tablet  Commonly known as: ADVIL/MOTRIN              Where to get your medicines      These medications were sent to [facility] [address]  [address] [address]    Phone: [phone]     pantoprazole 40 MG  EC tablet    sucralfate 1 GM/10ML suspension     Some of these will need a paper prescription and others can be bought over the counter. Ask your nurse if you have questions.    Bring a paper prescription for each of these medications    ferrous sulfate 325 (65 Fe) MG tablet        ALLERGIES:    Allergies   Allergen Reactions     No Known Allergies      Gadolinium Rash       DISPOSITION:    Discharged to home  Condition at discharge: Stable        Reason for your hospital stay    You were at [facility] due to symptomatic anemia.  You were found to have a hemoglobin of 5.0.  You received 3 units of packed red blood cell.  You were seen by gastroenterologist (stomach specialist Dr. Chavez).  You underwent an EGD (esophagus scope) that showed esophageal stenosis (narrowing) and 4 esophageal ulcers.  Your hemoglobin was monitored and is now stable.  You received an iron infusion.     Follow-up and recommended labs and tests     Follow up with Aye Wong Pa-C on 10/14/2021 at 9:30AM at [facility].  Recommend checking CBC and Iron studies.      Follow up with Dr. Chavez GI as recommended: In 1-2 weeks with plan for outpatient colonoscopy.  If you don't hear from them about setting up this appointment please call [phone].    Follow up with [facility] ([facility] at [address] [place] [address]).  They plan on calling you to set up an appointment.  If you don't hear from them please call [phone],     Activity    Your activity upon discharge: activity as tolerated     Wound care and dressings    Instructions to care for your wound at home:   Left leg wound every 3 to 4 days:  --Cleanse wound with saline, sterile water or wound cleanser.  --Pat dry.  --Apply alcohol free skin prep to surrounding skin  --If you choose to use a topical therapy consider a light layer of medical grade honey (cvs/amazon) or plurogel (medline).  --Apply thin silicone dressing (equivalent  to mepilex lite)   --Pad to your comfort level.  --Wear compression stocking.    --Consider using a lymphedema pump twice daily starting with 10 -15 minutes to start and work up to what is prescribed by lymphedema therapy     Diet    Follow this diet upon discharge: Orders Placed This Encounter      Mechanical/Dental Soft Diet        Consultations This Hospital Stay   GASTROENTEROLOGY IP CONSULT  WOUND OSTOMY CONTINENCE NURSE  IP CONSULT     LABORATORY IMAGING AND PROCEDURES:   Laboratory studies that included CBC with platelets differential, CMP, BMP x2, CBC with platelets, Serial HGB, INR, COVID-19 PCR, Occult blood (stool), LDH, Haptoglobin, Iron Studies (Iron/Iron Binding Capacity/Ferritin), Blood Type and screen, Reticulocyte count.    Upper GI Endoscopy.       PENDING RESULTS:    None     PHYSICAL EXAMINATION ON DAY OF DISCHARGE:    Temp: 97.9  F (36.6  C) Temp src: Oral BP: (!) 153/79 Pulse: 81   Resp: 16 SpO2: 100 % O2 Device: None (Room air)      Constitutional: Awake, alert, cooperative, no apparent distress.    ENT: Normocephalic, without obvious abnormality, atraumatic, oral pharynx with moist mucus membranes, tonsils without erythema or exudates.    Neck: Supple, symmetrical, trachea midline, no adenopathy.  Pulmonary: No increased work of breathing, good air exchange, clear to auscultation bilaterally, no crackles or wheezing.  Cardiovascular: R/R/R, normal S1 and S2, no S3 or S4, and systolic murmur noted 2-3/6.  GI: Active bowel sounds, soft, non-distended, non-tender.    Skin/Integumen: Clear.  Neuro: CN II-XII grossly intact.  Upper and lower extremities strength, coordination and sensation intact bilaterally.    Psych:  Alert and oriented x 3. Normal affect.  Extremities: 3+ pitting left lower extremity edema noted no edema on the right lower extremity.  Calves are non-tender to palpation bilaterally.

## 2021-10-09 DIAGNOSIS — Z71.89 OTHER SPECIFIED COUNSELING: ICD-10-CM

## 2021-10-10 ENCOUNTER — PATIENT OUTREACH (OUTPATIENT)
Dept: CARE COORDINATION | Facility: CLINIC | Age: 61
End: 2021-10-10

## 2021-10-10 NOTE — PROGRESS NOTES
Clinic Care Coordination Contact  [facility]/Voicemail       Clinical Data: Care Coordinator Outreach  Outreach attempted x 1.  Left message on patient's voicemail with call back information and requested return call.  Plan: Care Coordinator will try to reach patient again in 1-2 business days.      CHRISTA Owens  [phone]  [facility]

## 2021-10-11 NOTE — PROGRESS NOTES
Clinic Care Coordination Contact  [facility]: Post-Discharge Note  SITUATION                                                      Admission:    Admission Date: 10/06/21   Reason for Admission: Anemia; suspected acute on chronic due to blood loss  Discharge:   Discharge Date: 10/08/21  Discharge Diagnosis: Anemia; suspected acute on chronic due to blood loss    BACKGROUND                                                      Anemia; suspected acute on chronic due to blood loss  Iron deficiency   Patient received 3 units of PRBC.    Iron studies were completed in the ED: Iron level of 16, ferritin of 6, iron binding capacity of 449, iron saturation of 4.  Retic (Absolute and %) were within normal limit.    - GI consult appreciated:               --EGD completed with noted esophageal stenosis and 4 esophageal ulcers (non-bleeding).               --Mechanical soft diet.                 --Sucralfate suspension 1 g QID.                 --Protonix 40 mg BID.                 --Iron supplement daily.                 --Will follow up as an outpatient with planned colonoscopy.    - Mechanical soft diet.    - IV Fluids.    - HGB monitored.                 --Stable on day of discharge has remained > 7.0 throughout the morning.    - Conditional transfusion order placed for HGB < 7.0.    - 1x dose of IV Venofer on day of discharge.    *At discharge patient will be provided a supply of Sucralfate, Iron supplement and Protonix.       Cardiac murmur  Appreciated on day of discharge.  Patient without knowledge of murmur.  Could be due to anemia.    - Advised to discuss at upcoming PCP appointment.       GERD  - Resumed on PTA Protonix 40 mg BID.       HTN  - Hold PTA hydrochlorothiazide and plan to resume at discharge.       Klippel Trenaunay disease (AVM of left lower extremity with recurrent wound)  Chronic left lower extremity edema  Previously seen at [facility] for wound care.    - WOCN consult appreciated:               DC  wound every three to 4 days:               Cleanse wound with saline, sterile water or wound cleanser               Pat dry               Apply alcohol free skin prep to surrounding skin               If you choose to use a topical therapy consider a light layer of medical grade honey           (cvs/amazon) or plurogel (medline)               Apply thin silicone dressing (equivalent to mepilex lite)                Pad to your comfort level               Wear compression               Consider using a lymphedema pump twice daily starting with 10 -15 minutes to start          and work up to what is prescribed by lymphedema therapy  - [facility] will [facility] the patient to schedule an outpatient appointment.         ASSESSMENT           Discharge Assessment  How are you doing now that you are home?: Doing just fine, feel good!  How are your symptoms? (Red Flag symptoms escalate to triage hotline per guidelines): Improved  Do you feel your condition is stable enough to be safe at home until your provider visit?: Yes  Does the patient have their discharge instructions? : Yes  Does the patient have questions regarding their discharge instructions? : No  Were you started on any new medications or were there changes to any of your previous medications? : Yes (No questions)  Does the patient have all of their medications?: Yes  Do you have questions regarding any of your medications? : No  Discharge follow-up appointment scheduled within 14 calendar days? : Yes  Discharge Follow Up Appointment Date: 10/14/21  Discharge Follow Up Appointment Scheduled with?: Primary Care Provider    Post-op ([facility] Only)  If the patient had a surgery or procedure, do they have any questions for a nurse?: Yes (see comment) (EGD)    Post-op (Clinicians Only)  Fever: No  Chills: No  Eating & Drinking: eating and drinking without complaints/concerns  PO Intake: regular diet  Bowel Function: normal  Date of last BM:  10/11/21  Urinary Status: voiding without complaint/concerns        PLAN                                                      Outpatient Plan:   Follow up with Aye Wong Pa-C on 10/14/2021 at 9:30AM at [facility].  Recommend checking CBC and Iron studies.       Follow up with Dr. Chavez GI as recommended: In 1-2 weeks with plan for outpatient colonoscopy.  If you don't hear from them about setting up this appointment please call [phone].     Follow up with [facility] ([facility] [place] at [address] [place] [address]).  They plan on calling you to set up an appointment.  If you don't hear from them please call [phone],          Future Appointments   Date Time Provider Department Center   10/14/2021  9:30 AM Aye Osorio PA-C [facility]         For any urgent concerns, please contact our 24 hour nurse triage line: [phone] ([phone])         Aye Beltran MA

## 2021-10-14 ENCOUNTER — TELEPHONE (OUTPATIENT)
Facility: CLINIC | Age: 61
End: 2021-10-14

## 2021-10-14 ENCOUNTER — OFFICE VISIT (OUTPATIENT)
Dept: FAMILY MEDICINE | Facility: CLINIC | Age: 61
End: 2021-10-14
Payer: COMMERCIAL

## 2021-10-14 VITALS
WEIGHT: 196 LBS | HEIGHT: 69 IN | TEMPERATURE: 97.6 F | HEART RATE: 82 BPM | BODY MASS INDEX: 29.03 KG/M2 | RESPIRATION RATE: 16 BRPM | SYSTOLIC BLOOD PRESSURE: 154 MMHG | OXYGEN SATURATION: 100 % | DIASTOLIC BLOOD PRESSURE: 82 MMHG

## 2021-10-14 DIAGNOSIS — Q87.2 KLIPPEL TRENAUNAY SYNDROME: ICD-10-CM

## 2021-10-14 DIAGNOSIS — Q27.9 VENOUS MALFORMATION: ICD-10-CM

## 2021-10-14 DIAGNOSIS — R01.1 HEART MURMUR: ICD-10-CM

## 2021-10-14 DIAGNOSIS — Z09 HOSPITAL DISCHARGE FOLLOW-UP: Primary | ICD-10-CM

## 2021-10-14 DIAGNOSIS — I10 HYPERTENSION, UNSPECIFIED TYPE: ICD-10-CM

## 2021-10-14 DIAGNOSIS — Q27.9 VENOUS MALFORMATION: Primary | ICD-10-CM

## 2021-10-14 DIAGNOSIS — K22.11 ULCER OF ESOPHAGUS WITH BLEEDING: ICD-10-CM

## 2021-10-14 DIAGNOSIS — R60.0 EDEMA OF LOWER EXTREMITY: ICD-10-CM

## 2021-10-14 LAB
ALBUMIN SERPL-MCNC: 3.4 G/DL (ref 3.4–5)
ALP SERPL-CCNC: 132 U/L (ref 40–150)
ALT SERPL W P-5'-P-CCNC: 31 U/L (ref 0–50)
ANION GAP SERPL CALCULATED.3IONS-SCNC: 6 MMOL/L (ref 3–14)
AST SERPL W P-5'-P-CCNC: 29 U/L (ref 0–45)
BILIRUB SERPL-MCNC: 0.4 MG/DL (ref 0.2–1.3)
BUN SERPL-MCNC: 11 MG/DL (ref 7–30)
CALCIUM SERPL-MCNC: 9.2 MG/DL (ref 8.5–10.1)
CHLORIDE BLD-SCNC: 104 MMOL/L (ref 94–109)
CO2 SERPL-SCNC: 25 MMOL/L (ref 20–32)
CREAT SERPL-MCNC: 0.51 MG/DL (ref 0.52–1.04)
ERYTHROCYTE [DISTWIDTH] IN BLOOD BY AUTOMATED COUNT: ABNORMAL %
GFR SERPL CREATININE-BSD FRML MDRD: >90 ML/MIN/1.73M2
GLUCOSE BLD-MCNC: 106 MG/DL (ref 70–99)
HCT VFR BLD AUTO: 32.9 % (ref 35–47)
HGB BLD-MCNC: 9.5 G/DL (ref 11.7–15.7)
MCH RBC QN AUTO: 20.7 PG (ref 26.5–33)
MCHC RBC AUTO-ENTMCNC: 28.9 G/DL (ref 31.5–36.5)
MCV RBC AUTO: 72 FL (ref 78–100)
PLATELET # BLD AUTO: 401 10E3/UL (ref 150–450)
POTASSIUM BLD-SCNC: 3.8 MMOL/L (ref 3.4–5.3)
PROT SERPL-MCNC: 7.8 G/DL (ref 6.8–8.8)
RBC # BLD AUTO: 4.6 10E6/UL (ref 3.8–5.2)
SODIUM SERPL-SCNC: 135 MMOL/L (ref 133–144)
WBC # BLD AUTO: 5.3 10E3/UL (ref 4–11)

## 2021-10-14 PROCEDURE — 99215 OFFICE O/P EST HI 40 MIN: CPT | Performed by: PHYSICIAN ASSISTANT

## 2021-10-14 PROCEDURE — 85027 COMPLETE CBC AUTOMATED: CPT | Performed by: PHYSICIAN ASSISTANT

## 2021-10-14 PROCEDURE — 80053 COMPREHEN METABOLIC PANEL: CPT | Performed by: PHYSICIAN ASSISTANT

## 2021-10-14 PROCEDURE — 36415 COLL VENOUS BLD VENIPUNCTURE: CPT | Performed by: PHYSICIAN ASSISTANT

## 2021-10-14 ASSESSMENT — MIFFLIN-ST. JEOR: SCORE: 1518.43

## 2021-10-14 NOTE — PROGRESS NOTES
Assessment and Plan:     (Z09) Hospital discharge follow-up  (primary encounter diagnosis)  Comment: Admitted with hemoglobin of 5.3, had 3 units during hospitalization, was seen by GI and EGD done, see below, overall doing much better, upon questioning she had been fatigued for months  Plan: See below    (K22.11) Ulcer of esophagus with bleeding  Comment: For nonbleeding ulcer seen on EGD, has longstanding history of GERD, suspect she has had slow bleed for months, she reports generalized fatigue for months, maybe even a year  Plan: Continue Protonix, CBC, GI follow-up for colonoscopy    (R60.0) Edema of lower extremity  Comment: Chronic left lower extremity edema and wound, unchanged, no bleeding per patient  Plan: Referral to wound clinic    (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 Due to anemia, she does not recall ever having an echocardiogram  Plan: Echocardiogram Complete    (I10) Hypertension, unspecified type  Comment: on hydrochlorothiazide, has been prescribed amlodipine in the past, never started taking it because she was afraid of side effects, open to restarting today as blood pressure is high and she has been taking hydrochlorothiazide  Plan: Comprehensive metabolic panel (BMP + Alb, Alk         Phos, ALT, AST, Total. Bili, TP)       Add amlodipine 2.5 mg    She needs an establish care visit w/ a pcp sometime in the next month or so, I have asked her to schedule that      Aye Wong PA-C  40 minutes on the day of the encounter doing chart review, history and exam, documentation and further activities as noted above.        Minda Ross is a 61 year old who presents for the following health issues: hospital follow-up    HPI      Post Discharge Outreach 10/11/2021   Admission Date 10/6/2021    Reason for Admission Anemia; suspected acute on chronic due to blood loss   Discharge Date 10/8/2021   Discharge Diagnosis Anemia; suspected acute on chronic due to blood loss   How are you doing now that you are home? Doing just fine, feel good!   How are your symptoms? (Red Flag symptoms escalate to triage hotline per guidelines) Improved   Do you feel your condition is stable enough to be safe at home until your provider visit? Yes   Does the patient have their discharge instructions?  Yes   Does the patient have questions regarding their discharge instructions?  No   Were you started on any new medications or were there changes to any of your previous medications?  Yes   Does the patient have all of their medications? Yes   Do you have questions regarding any of your medications?  No   Discharge follow-up appointment scheduled within 14 calendar days?  Yes   Discharge Follow Up Appointment Date 10/14/2021   Discharge Follow Up Appointment Scheduled with? Primary Care Provider     Hospital Follow-up Visit:    Hospital/Nursing Home/IP Rehab Facility: [facility]  Date of Admission: 10/06/2021  Date of Discharge: 10/08/2021  Reason(s) for Admission: Esophageal reflux, Anemia, Hypertensive disorder      Was your hospitalization related to COVID-19? No   Problems taking medications regularly:  None  Medication changes since discharge: None  Problems adhering to non-medication therapy:  None    Summary of hospitalization:  [facility] discharge summary reviewed  Diagnostic Tests/Treatments reviewed.  Follow up needed: GI, wound care  Other Healthcare Providers Involved in Patient s Care:         Specialist appointment - Dr. Chavez  Update since discharge: improved.       Post Discharge Medication Reconciliation: discharge medications reconciled, continue medications without change.  Plan of care communicated with patient                Patient was seen in clinic 10/6/2021 for  a preop (eye surgery) and was sent to ED with critical hgb of 5.3.   Notably, the patient has undergone several EGDs the last occurring about 9-10 days prior to admission with Dr. Chavez.  While in the ED, patient complained of feeling unwell and mainly being very fatigued.       Work-up in the ED included a BMP with a creatinine of 0.56 with GFR > 90 and glucose of 123 otherwise within normal limits. The patient was transfused two units in the ED and admitted.   She ended up getting a total of 3 units PRBCs while in the hospital.  Iron studies were completed in the ED: Iron level of 16, ferritin of 6, iron binding capacity of 449, iron saturation of 4.  Retic (Absolute and %) were within normal limit.  GI was consulted and EGD completed with noted esophageal stenosis and 4 esophageal ulcers (non-bleeding).  She was started on protonix BID, sucralfate and iron supplementation.      She was also noted to have a new murmur on discharge.  She has never had an echocardiogram.       Hydrochlorothiazide was held in hospital she resumed over the weekend. She was prescribed amlodipine in the past but never took it.        She also has Klippel Trenaunay disease (AVM of left lower extremity with recurrent wound) and chronic left lower extremity edema.  She was previously seen at [place] for wound care.  She denies any bleeding from the wound.  She is open to seeing wound care through  since closer.    She is feeling much better since discharge.  She states she didn't realize how tired she has been over last several months until energy improved with transfusions/iron.  Sees Dr. Chavez for colonoscopy in 3 weeks.     She states her LLE wound is the same.  It does not bleed, no increased pain/swelling or redness.       She is eating and drinking well.  She is back to a normal diet.  No nausea/vomiting, bloody/black stool.    She denies fever/chills, chest pain, sob.      Review of Systems   See above      Objective      BP (!)  "154/82   Pulse 82   Temp 97.6  F (36.4  C) (Temporal)   Resp 16   Ht 1.753 m (5' 9\")   Wt 88.9 kg (196 lb)   LMP 08/05/2012   SpO2 100%   BMI 28.94 kg/m        Physical Exam     GENERAL: healthy, alert and no distress  RESP: lungs clear to auscultation - no rales, no rhonchi, no wheezes  CV: regular rates and rhythm, normal S1 S2, no S3 or S4 and no murmur, no click or rub   ABD: soft, NT, +BS  MS: extremities- chronic LLE edema/wound on lateral aspect, dressed and with thigh high hose on            "

## 2021-10-14 NOTE — PATIENT INSTRUCTIONS
Labs today    Start amlodipine today at 2.5mg daily    Schedule echocardiogram: cardiology procedures [phone]

## 2021-10-14 NOTE — TELEPHONE ENCOUNTER
Consult received via workque from provider Juan Jose for ulcer of the leg.     Per Dr Mendenhall Patient qualifies for Venous insufficiency to be scheduled prior to assessment with Za at [facility].     Routing to  Lurdes Bejarano.

## 2021-10-19 NOTE — TELEPHONE ENCOUNTER
2nd and final attempt     to sched Venous Comp and NEW CONSULT with Dr. Mendenhall.    Ashlyn CAMARGO

## 2021-10-27 ENCOUNTER — APPOINTMENT (OUTPATIENT)
Dept: CARDIOLOGY | Facility: CLINIC | Age: 61
DRG: 522 | End: 2021-10-27
Attending: PHYSICIAN ASSISTANT
Payer: COMMERCIAL

## 2021-10-27 ENCOUNTER — HOSPITAL ENCOUNTER (INPATIENT)
Facility: CLINIC | Age: 61
LOS: 3 days | Discharge: HOME HEALTH | DRG: 522 | End: 2021-10-30
Attending: EMERGENCY MEDICINE | Admitting: INTERNAL MEDICINE
Payer: COMMERCIAL

## 2021-10-27 ENCOUNTER — APPOINTMENT (OUTPATIENT)
Dept: GENERAL RADIOLOGY | Facility: CLINIC | Age: 61
DRG: 522 | End: 2021-10-27
Attending: EMERGENCY MEDICINE
Payer: COMMERCIAL

## 2021-10-27 DIAGNOSIS — S72.001A HIP FRACTURE, RIGHT, CLOSED, INITIAL ENCOUNTER (H): ICD-10-CM

## 2021-10-27 LAB
ABO/RH(D): NORMAL
ANION GAP SERPL CALCULATED.3IONS-SCNC: 8 MMOL/L (ref 3–14)
ANTIBODY SCREEN: NEGATIVE
BASOPHILS # BLD AUTO: 0 10E3/UL (ref 0–0.2)
BASOPHILS NFR BLD AUTO: 0 %
BUN SERPL-MCNC: 11 MG/DL (ref 7–30)
CALCIUM SERPL-MCNC: 8.6 MG/DL (ref 8.5–10.1)
CHLORIDE BLD-SCNC: 101 MMOL/L (ref 94–109)
CO2 SERPL-SCNC: 24 MMOL/L (ref 20–32)
CREAT SERPL-MCNC: 0.43 MG/DL (ref 0.52–1.04)
EOSINOPHIL # BLD AUTO: 0 10E3/UL (ref 0–0.7)
EOSINOPHIL NFR BLD AUTO: 0 %
ERYTHROCYTE [DISTWIDTH] IN BLOOD BY AUTOMATED COUNT: ABNORMAL %
GFR SERPL CREATININE-BSD FRML MDRD: >90 ML/MIN/1.73M2
GLUCOSE BLD-MCNC: 132 MG/DL (ref 70–99)
HCT VFR BLD AUTO: 33.3 % (ref 35–47)
HGB BLD-MCNC: 9.3 G/DL (ref 11.7–15.7)
IMM GRANULOCYTES # BLD: 0.1 10E3/UL
IMM GRANULOCYTES NFR BLD: 1 %
LVEF ECHO: NORMAL
LYMPHOCYTES # BLD AUTO: 1.2 10E3/UL (ref 0.8–5.3)
LYMPHOCYTES NFR BLD AUTO: 14 %
MCH RBC QN AUTO: 20.7 PG (ref 26.5–33)
MCHC RBC AUTO-ENTMCNC: 27.9 G/DL (ref 31.5–36.5)
MCV RBC AUTO: 74 FL (ref 78–100)
MONOCYTES # BLD AUTO: 0.5 10E3/UL (ref 0–1.3)
MONOCYTES NFR BLD AUTO: 6 %
NEUTROPHILS # BLD AUTO: 6.9 10E3/UL (ref 1.6–8.3)
NEUTROPHILS NFR BLD AUTO: 79 %
NRBC # BLD AUTO: 0 10E3/UL
NRBC BLD AUTO-RTO: 0 /100
PLATELET # BLD AUTO: 433 10E3/UL (ref 150–450)
POTASSIUM BLD-SCNC: 4 MMOL/L (ref 3.4–5.3)
RBC # BLD AUTO: 4.49 10E6/UL (ref 3.8–5.2)
SARS-COV-2 RNA RESP QL NAA+PROBE: NEGATIVE
SODIUM SERPL-SCNC: 133 MMOL/L (ref 133–144)
SPECIMEN EXPIRATION DATE: NORMAL
WBC # BLD AUTO: 8.7 10E3/UL (ref 4–11)

## 2021-10-27 PROCEDURE — 36415 COLL VENOUS BLD VENIPUNCTURE: CPT | Performed by: EMERGENCY MEDICINE

## 2021-10-27 PROCEDURE — C9803 HOPD COVID-19 SPEC COLLECT: HCPCS

## 2021-10-27 PROCEDURE — 85025 COMPLETE CBC W/AUTO DIFF WBC: CPT | Performed by: EMERGENCY MEDICINE

## 2021-10-27 PROCEDURE — 93005 ELECTROCARDIOGRAM TRACING: CPT

## 2021-10-27 PROCEDURE — 86901 BLOOD TYPING SEROLOGIC RH(D): CPT | Performed by: EMERGENCY MEDICINE

## 2021-10-27 PROCEDURE — 120N000001 HC R&B MED SURG/OB

## 2021-10-27 PROCEDURE — 93306 TTE W/DOPPLER COMPLETE: CPT | Mod: 26 | Performed by: INTERNAL MEDICINE

## 2021-10-27 PROCEDURE — 255N000002 HC RX 255 OP 636: Performed by: INTERNAL MEDICINE

## 2021-10-27 PROCEDURE — 73502 X-RAY EXAM HIP UNI 2-3 VIEWS: CPT

## 2021-10-27 PROCEDURE — 999N000208 ECHOCARDIOGRAM COMPLETE

## 2021-10-27 PROCEDURE — 99223 1ST HOSP IP/OBS HIGH 75: CPT | Mod: AI | Performed by: INTERNAL MEDICINE

## 2021-10-27 PROCEDURE — 99285 EMERGENCY DEPT VISIT HI MDM: CPT | Mod: 25

## 2021-10-27 PROCEDURE — 258N000003 HC RX IP 258 OP 636: Performed by: PHYSICIAN ASSISTANT

## 2021-10-27 PROCEDURE — 250N000011 HC RX IP 250 OP 636: Performed by: EMERGENCY MEDICINE

## 2021-10-27 PROCEDURE — 80048 BASIC METABOLIC PNL TOTAL CA: CPT | Performed by: EMERGENCY MEDICINE

## 2021-10-27 PROCEDURE — 87635 SARS-COV-2 COVID-19 AMP PRB: CPT | Performed by: EMERGENCY MEDICINE

## 2021-10-27 PROCEDURE — 250N000013 HC RX MED GY IP 250 OP 250 PS 637: Performed by: PHYSICIAN ASSISTANT

## 2021-10-27 PROCEDURE — 96374 THER/PROPH/DIAG INJ IV PUSH: CPT

## 2021-10-27 RX ORDER — HYDROMORPHONE HCL IN WATER/PF 6 MG/30 ML
0.2 PATIENT CONTROLLED ANALGESIA SYRINGE INTRAVENOUS
Status: DISCONTINUED | OUTPATIENT
Start: 2021-10-27 | End: 2021-10-28

## 2021-10-27 RX ORDER — TRANEXAMIC ACID 10 MG/ML
1 INJECTION, SOLUTION INTRAVENOUS ONCE
Status: COMPLETED | OUTPATIENT
Start: 2021-10-28 | End: 2021-10-28

## 2021-10-27 RX ORDER — LIDOCAINE 40 MG/G
CREAM TOPICAL
Status: DISCONTINUED | OUTPATIENT
Start: 2021-10-27 | End: 2021-10-28

## 2021-10-27 RX ORDER — NALOXONE HYDROCHLORIDE 0.4 MG/ML
0.4 INJECTION, SOLUTION INTRAMUSCULAR; INTRAVENOUS; SUBCUTANEOUS
Status: DISCONTINUED | OUTPATIENT
Start: 2021-10-27 | End: 2021-10-30 | Stop reason: HOSPADM

## 2021-10-27 RX ORDER — AMOXICILLIN 250 MG
1 CAPSULE ORAL 2 TIMES DAILY PRN
Status: DISCONTINUED | OUTPATIENT
Start: 2021-10-27 | End: 2021-10-30 | Stop reason: HOSPADM

## 2021-10-27 RX ORDER — AMOXICILLIN 250 MG
2 CAPSULE ORAL 2 TIMES DAILY PRN
Status: DISCONTINUED | OUTPATIENT
Start: 2021-10-27 | End: 2021-10-30 | Stop reason: HOSPADM

## 2021-10-27 RX ORDER — CEFAZOLIN SODIUM 2 G/100ML
2 INJECTION, SOLUTION INTRAVENOUS
Status: COMPLETED | OUTPATIENT
Start: 2021-10-27 | End: 2021-10-28

## 2021-10-27 RX ORDER — ACETAMINOPHEN 325 MG/1
975 TABLET ORAL EVERY 8 HOURS
Status: DISCONTINUED | OUTPATIENT
Start: 2021-10-27 | End: 2021-10-28 | Stop reason: ALTCHOICE

## 2021-10-27 RX ORDER — HYDROCHLOROTHIAZIDE 12.5 MG/1
12.5 TABLET ORAL DAILY
Status: DISCONTINUED | OUTPATIENT
Start: 2021-10-27 | End: 2021-10-30 | Stop reason: HOSPADM

## 2021-10-27 RX ORDER — METHOCARBAMOL 500 MG/1
500 TABLET, FILM COATED ORAL 4 TIMES DAILY PRN
Status: DISCONTINUED | OUTPATIENT
Start: 2021-10-27 | End: 2021-10-28 | Stop reason: DRUGHIGH

## 2021-10-27 RX ORDER — BISACODYL 10 MG
10 SUPPOSITORY, RECTAL RECTAL DAILY PRN
Status: DISCONTINUED | OUTPATIENT
Start: 2021-10-27 | End: 2021-10-28

## 2021-10-27 RX ORDER — ONDANSETRON 2 MG/ML
4 INJECTION INTRAMUSCULAR; INTRAVENOUS EVERY 6 HOURS PRN
Status: DISCONTINUED | OUTPATIENT
Start: 2021-10-27 | End: 2021-10-28

## 2021-10-27 RX ORDER — NALOXONE HYDROCHLORIDE 0.4 MG/ML
0.2 INJECTION, SOLUTION INTRAMUSCULAR; INTRAVENOUS; SUBCUTANEOUS
Status: DISCONTINUED | OUTPATIENT
Start: 2021-10-27 | End: 2021-10-30 | Stop reason: HOSPADM

## 2021-10-27 RX ORDER — SUCRALFATE 1 G/1
1 TABLET ORAL 4 TIMES DAILY
Status: DISCONTINUED | OUTPATIENT
Start: 2021-10-27 | End: 2021-10-30 | Stop reason: HOSPADM

## 2021-10-27 RX ORDER — POLYETHYLENE GLYCOL 3350 17 G/17G
17 POWDER, FOR SOLUTION ORAL DAILY
Status: DISCONTINUED | OUTPATIENT
Start: 2021-10-27 | End: 2021-10-28

## 2021-10-27 RX ORDER — OXYCODONE HYDROCHLORIDE 5 MG/1
5 TABLET ORAL EVERY 4 HOURS PRN
Status: DISCONTINUED | OUTPATIENT
Start: 2021-10-27 | End: 2021-10-28

## 2021-10-27 RX ORDER — ONDANSETRON 4 MG/1
4 TABLET, ORALLY DISINTEGRATING ORAL EVERY 6 HOURS PRN
Status: DISCONTINUED | OUTPATIENT
Start: 2021-10-27 | End: 2021-10-28

## 2021-10-27 RX ORDER — SUCRALFATE 1 G/1
1 TABLET ORAL 4 TIMES DAILY
COMMUNITY
End: 2023-07-11

## 2021-10-27 RX ORDER — SODIUM CHLORIDE 9 MG/ML
INJECTION, SOLUTION INTRAVENOUS CONTINUOUS
Status: DISCONTINUED | OUTPATIENT
Start: 2021-10-27 | End: 2021-10-28 | Stop reason: ALTCHOICE

## 2021-10-27 RX ORDER — PANTOPRAZOLE SODIUM 40 MG/1
40 TABLET, DELAYED RELEASE ORAL 2 TIMES DAILY
Status: DISCONTINUED | OUTPATIENT
Start: 2021-10-27 | End: 2021-10-30 | Stop reason: HOSPADM

## 2021-10-27 RX ORDER — CEFAZOLIN SODIUM 2 G/100ML
2 INJECTION, SOLUTION INTRAVENOUS SEE ADMIN INSTRUCTIONS
Status: DISCONTINUED | OUTPATIENT
Start: 2021-10-27 | End: 2021-10-28 | Stop reason: HOSPADM

## 2021-10-27 RX ORDER — KETOROLAC TROMETHAMINE 15 MG/ML
15 INJECTION, SOLUTION INTRAMUSCULAR; INTRAVENOUS ONCE
Status: COMPLETED | OUTPATIENT
Start: 2021-10-27 | End: 2021-10-27

## 2021-10-27 RX ORDER — FERROUS SULFATE 325(65) MG
325 TABLET ORAL
Status: DISCONTINUED | OUTPATIENT
Start: 2021-10-28 | End: 2021-10-30 | Stop reason: HOSPADM

## 2021-10-27 RX ADMIN — SODIUM CHLORIDE: 9 INJECTION, SOLUTION INTRAVENOUS at 15:52

## 2021-10-27 RX ADMIN — SUCRALFATE 1 G: 1 TABLET ORAL at 21:47

## 2021-10-27 RX ADMIN — KETOROLAC TROMETHAMINE 15 MG: 15 INJECTION, SOLUTION INTRAMUSCULAR; INTRAVENOUS at 09:58

## 2021-10-27 RX ADMIN — ACETAMINOPHEN 975 MG: 325 TABLET, FILM COATED ORAL at 21:47

## 2021-10-27 RX ADMIN — HUMAN ALBUMIN MICROSPHERES AND PERFLUTREN 9 ML: 10; .22 INJECTION, SOLUTION INTRAVENOUS at 14:58

## 2021-10-27 RX ADMIN — ACETAMINOPHEN 975 MG: 325 TABLET, FILM COATED ORAL at 15:42

## 2021-10-27 RX ADMIN — OXYCODONE HYDROCHLORIDE 5 MG: 5 TABLET ORAL at 15:45

## 2021-10-27 RX ADMIN — PANTOPRAZOLE SODIUM 40 MG: 40 TABLET, DELAYED RELEASE ORAL at 21:48

## 2021-10-27 RX ADMIN — SUCRALFATE 1 G: 1 TABLET ORAL at 18:50

## 2021-10-27 RX ADMIN — HYDROCHLOROTHIAZIDE 12.5 MG: 12.5 TABLET ORAL at 15:42

## 2021-10-27 RX ADMIN — SUCRALFATE 1 G: 1 TABLET ORAL at 15:42

## 2021-10-27 RX ADMIN — POLYETHYLENE GLYCOL 3350 17 G: 17 POWDER, FOR SOLUTION ORAL at 15:44

## 2021-10-27 ASSESSMENT — ACTIVITIES OF DAILY LIVING (ADL)
ADLS_ACUITY_SCORE: 22
ADLS_ACUITY_SCORE: 16
ADLS_ACUITY_SCORE: 22
ADLS_ACUITY_SCORE: 16
ADLS_ACUITY_SCORE: 20
ADLS_ACUITY_SCORE: 20
ADLS_ACUITY_SCORE: 16
ADLS_ACUITY_SCORE: 22
ADLS_ACUITY_SCORE: 16
ADLS_ACUITY_SCORE: 22
ADLS_ACUITY_SCORE: 20
ADLS_ACUITY_SCORE: 12

## 2021-10-27 ASSESSMENT — MIFFLIN-ST. JEOR: SCORE: 1536.57

## 2021-10-27 ASSESSMENT — ENCOUNTER SYMPTOMS
ARTHRALGIAS: 1
HEADACHES: 0

## 2021-10-27 NOTE — H&P
[facility]    History and Physical - Hospitalist Service       Date of Admission:  10/27/2021  PRIMARY CARE PROVIDER:    No Ref-Primary, Physician    Assessment & Plan   Vandana Gonzalez is a 61 year old female admitted on 10/27/2021.    Past medical history significant for Anemia (iron deficiency and chronic blood loss), HTN, GERD, Esophageal ulcers, Klippel Trenaunay disease (AVM of left lower extremity), Chronic left lower extremity edema, Recurrent/chronic left lower extremity stasis ulceration who was admitted to [facility] after a mechanical fall that resulted in a right femoral neck fracture.      Patient slipped on the hardwood floor of her kitchen last night.  She attempted to catch herself on a mobile kitchen island that was unfortunately unlocked and and ultimately fell directly on her right hip.  She has had persistent pain since the fall.      Patient was evaluated by Dr. Archibald in the ED.  This included a BMP that revealed a creatinine of 0.43 with GFR > 90 and glucose of 132 otherwise within normal limits.  CBC with differential revealed a WBC of 8.7, HGB of 9.3, Hematocrit of 33.3, PLT count of 433, MCV of 74, MCH of 20.7, MCHC of 27.9, abs immature granulocytes of 0.1 otherwise within normal limits.  Blood type and screen was completed.  1 view right sided pelvis/hip xray performed with noted right femoral neck fracture.  Orthopedic surgery was consulted.      Mechanical fall  Right femoral neck fracture  - Formal Ortho surgery consult requested.    - Analgesic management:   --Schedule APAP 975 mg every 8 hours.     --PRN PO oxycodone 5 mg every 4 hours.     --PRN IV Dilaudid every 2 hours.    - Await PT/OT consult for after surgery.    **Patient has had no issues with anesthesia in the past.  She ambulates with a crutch but stated she goes slow but does not develop shortness of breath or chest pain when ambulating for a block or so.  Based of revised cardiac risk  assessment patient is at low risk to proceed with intermediate risk surgery.      Systolic murmur  Patient with noted systolic murmur on physical exam.  Discussed with the patient and she indicated that she was suppose to schedule an outpatient ECHO which she has yet to schedule.    - ECHO ordered:   --Discussed with ECHO tech regarding getting this completed prior to surgery.      HTN  Started on Norvasc 2.5 mg/d on 10/14/21 and on hydrochlorothiazide 12.5 mg/d.  When discussed with the patient she stated she has yet to start the Norvasc.    - Resumed on PTA hydrochlorothiazide.    - Hold on Norvasc as the patient has yet to start this.      GERD  Esophageal ulcerations, recent history  Anemia (iron deficiency and chronic blood loss)  Noted 4 esophageal ulcerations and esophageal stenosis on EGD 10/7/2021.    - Resumed on PTA Protonix 40 mg BID.    - Resumed on PTA sucralfate suspension 1 gm QID.    - Resumed on iron supplementation.      Klippel Trenaunay disease (AVM of left lower extremity)  Chronic left lower extremity edema  Recurrent/chronic left lower extremity stasis ulceration  Initially discharge with plans to see Wound clinic as outpatient; has not set up appointment.    - WOCN consult requested.    - Resumed on PTA hydrochlorothiazide.   - Continue with compression hose.       Clinically Significant Risk Factors Present on Admission                        Diet: NPO for Medical/Clinical Reasons Except for: Ice Chips    DVT Prophylaxis: Pneumatic Compression Devices  Lemons Catheter: Not present  Code Status: Full Code; discussed with the patient.         Disposition Plan   Expected discharge: 10/30/2021; recommended to TBD once right hip surgery completed, adeauate pain control and safe dispo plan.  Entered: Chuy Macias PA-C 10/27/2021, 11:22 AM     The patient's care was discussed with the Patient and Echo Tech.    The patient has been discussed with Dr. Robins, who agrees with the  assessment and plan at this time.    Chuy Macias PA-C  [facility]  Securely message with the [employer] Web Console (learn more here)  Text page via [employer] Paging/Directory    ______________________________________________________________________    Chief Complaint   Fall with right sided hip pain.      History is obtained from the patient and EMR.      History of Present Illness   Vandana Gonzalez is a 61 year old female with a past medical history significant for Anemia (iron deficiency and chronic blood loss), HTN, GERD, Esophageal ulcers, Klippel Trenaunay disease (AVM of left lower extremity), Chronic left lower extremity edema, Recurrent/chronic left lower extremity stasis ulceration who was admitted to [facility] after a mechanical fall that resulted in a right femoral neck fracture.      Patient slipped on the hardwood floor of her kitchen last night.  She attempted to catch herself on a mobile kitchen island that was unfortunately unlocked and and ultimately fell directly on her right hip.  She has had persistent pain since the fall.      Patient was evaluated by Dr. Archibald in the ED.  This included a BMP that revealed a creatinine of 0.43 with GFR > 90 and glucose of 132 otherwise within normal limits.  CBC with differential revealed a WBC of 8.7, HGB of 9.3, Hematocrit of 33.3, PLT count of 433, MCV of 74, MCH of 20.7, MCHC of 27.9, abs immature granulocytes of 0.1 otherwise within normal limits.  Blood type and screen was completed.  1 view right sided pelvis/hip xray performed with noted right femoral neck fracture.  Orthopedic surgery was consulted.      Patient was resting comfortably on the gurney upon arrival.  We reviewed medical and surgical history as well as PTA medications.  She has yet to start Norvasc.      We discussed her falling last night and coming into the ED.  We dicussed the hip Xray results and plan for Ortho to see.  Upon questioning, she stated  she has the chronic left leg wound that is weepy but unchanged.  She has had no change in swelling or coloration of her left leg.  Lately, she has been feeling more constipated and thinks it is because of the iron supplement she is taking.      During physical exam, we discussed her heart murmur and she said she was advised to call and schedule an outpatient ECHO which she has not yet done.  We discussed plan for proceeding with ECHO prior to surgery.  She was asked about follow-up with wound clinic and she said she had not scheduled an appointment yet.  She stated she had no issues with anesthesia in the past and ambulates with a crutch.  She does not develop chest pain or shortness of breath when ambulating.      Review of Systems    The 10 point Review of Systems is negative other than noted in the HPI.      Past Medical History    I have reviewed this patient's medical history and updated it with pertinent information if needed.   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, unspecified site     large angioma adomen     Hypertensive disorder      Klippel Trenaunay disease 2002    AVM left leg     Phlebitis and thrombophlebitis of other deep vessels of lower extremities 6-2002    AVM left leg     Stasis ulcer of lower extremity (H) 2012    Left-recurrent   Anemia (iron deficiency and chronic blood loss), HTN, GERD, Esophageal ulcers, Klippel Trenaunay disease (AVM of left lower extremity), Chronic left lower extremity edema, Recurrent/chronic left lower extremity stasis ulceration    Past Surgical History   I have reviewed this patient's surgical history and updated it with pertinent information if needed.  Past Surgical History:   Procedure Laterality Date     BIOPSY OF BREAST, INCISIONAL  7-09    phyllodes tumor left     BREAST SURGERY  7-2009     C LIGATN FEMORAL VEIN      multiple vein  strippings left     ESOPHAGOSCOPY, GASTROSCOPY, DUODENOSCOPY (EGD), COMBINED N/A 10/7/2021    Procedure: ESOPHAGOGASTRODUODENOSCOPY (EGD);  Surgeon: Romeo Chavez MD;  Location:  GI     FEMUR SURGERY       left side      ORTHOPEDIC SURGERY      repair broken femur     ZZHC UGI ENDOSCOPY, SIMPLE EXAM      esophageal stricture       Social History   I have reviewed this patient's social history and updated it with pertinent information if needed.  Patient resides in a house in [place], [place] with her . She is a former smoker who quit in .  She consumes alcohol about 2-3 beers per night.  She does not use illicit drugs.  She ambulates with a single crutch at baseline.    Social History     Tobacco Use     Smoking status: Former Smoker     Packs/day: 0.25     Years: 2.00     Pack years: 0.50     Types: Cigarettes     Start date: 10/1/1990     Quit date: 10/1/1992     Years since quittin.0     Smokeless tobacco: Never Used     Tobacco comment: Quit    Substance Use Topics     Alcohol use: Yes     Alcohol/week: 0.0 standard drinks     Comment: couple beers per day     Drug use: No        Family History   I have reviewed this patient's family history and updated it with pertinent information if needed.   Family History   Problem Relation Age of Onset     Cancer - colorectal Father      Diabetes Brother        Prior to Admission Medications   Prior to Admission Medications   Prescriptions Last Dose Informant Patient Reported? Taking?   ferrous sulfate (FEROSUL) 325 (65 Fe) MG tablet 10/26/2021 at AM Self No Yes   Sig: Take 1 tablet (325 mg) by mouth daily (with breakfast)   hydrochlorothiazide (HYDRODIURIL) 12.5 MG tablet 10/26/2021 at AM Self No Yes   Sig: Take 1 tablet (12.5 mg) by mouth daily   pantoprazole (PROTONIX) 40 MG EC tablet 10/26/2021 at PM Self No Yes   Sig: Take 1 tablet (40 mg) by mouth 2 times daily   sucralfate (CARAFATE) 1 GM tablet 10/26/2021 at 3 doses  Self Yes Yes   Sig: Take 1 g by mouth 4 times daily   sucralfate (CARAFATE) 1 GM/10ML suspension Not Taking at Unknown time Self No No   Sig: Take 10 mLs (1 g) by mouth 4 times daily (before meals and nightly)   Patient not taking: Reported on 10/27/2021      Facility-Administered Medications: None     Allergies   Allergies   Allergen Reactions     No Known Allergies      Gadolinium Rash       Physical Exam   Vital Signs: Temp: 98.3  F (36.8  C) Temp src: Oral BP: (!) 141/77 Pulse: 91   Resp: 18 SpO2: 97 % O2 Device: None (Room air)    Weight: 200 lbs 0 oz    Constitutional: Awake, alert, cooperative, no apparent distress.    ENT: Normocephalic, without obvious abnormality, atraumatic.  Oral exam deferred as patient is wearing a mask.  Eyes pupils are equal, round and reactive to light; extra occular movements intact.  Normal sclera.    Neck: Supple, symmetrical, trachea midline, no adenopathy.  Pulmonary: No increased work of breathing, good air exchange, clear to auscultation bilaterally, no crackles or wheezing.  Cardiovascular: Regular rate and rhythm, normal S1 and S2, no S3 or S4, and 2/6 murmur noted.  GI: Normal bowel sounds, soft, non-distended, non-tender.    Skin/Integumen: Left foot/toes are purplish but patient stated this is baseline.  Noted covered left leg wound with noted weepiness.    Neuro: CN II-XII grossly intact.  Upper extremities strength, coordination and sensation intact bilaterally.  Limited lower extremity assessment performed.  Dorsal and plantar flexion even and intact bilaterally.    Psych:  Alert and oriented x 3. Normal affect.  Extremities: 3+ Pitting left lower extremity edema noted with compression hose in place, and calves are non-tender to palpation bilaterally.      Data   Data reviewed today: I reviewed all medications, new labs and imaging results over the last 24 hours. I personally reviewed no images or EKG's today.    Recent Labs   Lab 10/27/21  0956   WBC 8.7   HGB 9.3*    MCV 74*         POTASSIUM 4.0   CHLORIDE 101   CO2 24   BUN 11   CR 0.43*   ANIONGAP 8   MATTEO 8.6   *     Recent Results (from the past 24 hour(s))   XR Pelvis w Hip Right 1 View    Narrative    XR PELVIS AND HIP RIGHT 1 VIEW 10/27/2021 10:19 AM     HISTORY: right hip      Impression    IMPRESSION: Right femoral neck fracture. Hip joint space width appears  within normal limits.    DOUG RENEE MD         SYSTEM ID:  [id number]

## 2021-10-27 NOTE — ED PROVIDER NOTES
"  History   Chief Complaint:  Hip Pain       The history is provided by the patient.      Vandana Gonzalez is a 61 year old female with history of hypertension and anemia who presents via EMS for evaluation of right hip pain in the setting of mechanical fall that occurred last night. The patient was walking through her kitchen to go to bed when she slipped on the hardwood floor. She tried catching herself on the kitchen island, however as this was unlocked and on wheels she ultimately fell onto the floor. Patient landed directly on her right hip, denies hitting her head or losing consciousness. She has persisting hip pain but denies any other concerns.    Review of Systems   Musculoskeletal: Positive for arthralgias (R hip) and gait problem (secondary to injury).   Neurological: Negative for syncope and headaches.   All other systems reviewed and are negative.    Allergies:  Gadolinium    Medications:  Ferrous sulfate  Hydrochlorothiazide  Pantoprazole    Past Medical History:     Hypertension  Klippel Trenaunay disease  Anemia    Past Surgical History:    Incisional biopsy of left breast  Breast surgery  Multiple vein stripping of LLE  EGD  Left femur repair  Repair for esophageal structure    Family History:    Father - colorectal cancer  Brother - diabetes    Social History:  The patient was accompanied to the ED by EMS.  Marital Status:     Physical Exam     Patient Vitals for the past 24 hrs:   BP Temp Temp src Pulse Resp SpO2 Height Weight   10/27/21 1001 -- 98.3  F (36.8  C) Oral -- -- -- -- --   10/27/21 0949 (!) 141/77 -- Oral 91 18 97 % 1.753 m (5' 9\") 90.7 kg (200 lb)       Physical Exam  Vitals reviewed.   HENT:      Head: Normocephalic and atraumatic.      Right Ear: Tympanic membrane normal.      Left Ear: Tympanic membrane normal.      Nose: Nose normal.      Mouth/Throat:      Mouth: Mucous membranes are moist.   Cardiovascular:      Rate and Rhythm: Normal rate and regular rhythm.   Pulmonary:     "  Effort: Pulmonary effort is normal.      Breath sounds: Normal breath sounds.   Abdominal:      Palpations: Abdomen is soft.   Musculoskeletal:      Comments: Shortening and external rotation of the right leg. Patient is tender over the right hip. There is no open wound. Patient has normal DP PT pulse and is intact sensation.   Skin:     Capillary Refill: Capillary refill takes less than 2 seconds.   Neurological:      General: No focal deficit present.      Mental Status: She is alert and oriented to person, place, and time.   Psychiatric:         Mood and Affect: Mood normal.         Behavior: Behavior normal.           Emergency Department Course     Imaging:  XR Pelvis w Hip Right 1 View:  Right femoral neck fracture. Hip joint space width appears within normal limits.  Report per radiology.    Laboratory:  CBC: WBC 8.7, HGB 9.3 (L),   BMP: Glucose 132 (H), Creatinine 0.43 (L), o/w WNL    ABO/Rh type and screen: O positive    Asymptomatic SARS-CoV2 (COVID-19) Virus: In process on admission    Procedures  None.    Emergency Department Course:  Reviewed:  I reviewed nursing notes, vitals, past medical history and Care Everywhere    Assessments:  0946 I obtained history and examined the patient in ED room 06 as noted above.   1030 I rechecked the patient and explained X-ray findings.     Consults:  1052 I spoke with Lizzie Carlisle PA-C of the Orthopedics service regarding patient's presentation, findings, and plan of care.  1116 I consulted with Dr. Robins of the hospitalist service regarding patient, who agrees to admit patient to hospital in monitored bed.     Interventions:  0958 Toradol 15 mg IV    Disposition:  The patient was admitted to the hospital under the care of Dr. Robins.     Impression & Plan         Medical Decision Making:  Patient presents with mechanical fall x-rays confirm right hip fracture clinical suspicions are high based on her shortened externally rotated right leg. Patient  was informed of the findings pain management was provided patient was admitted for definitive surgical care by orthopedic surgery. Orthopedic surgery requested hospitalist for admission care was discussed with Ortho also the hospitalist. No evidence or history of head injury. Patient C-spine cleared clinically using Nexus criteria. Other extremities are normal exam.    Diagnosis:    ICD-10-CM    1. Hip fracture, right, closed, initial encounter (H)  S72.001A        Scribe Disclosure:  Anali FLORES, am serving as a scribe at 9:46 AM on 10/27/2021 to document services personally performed by Chuy Archibald MD based on my observations and the provider's statements to me.     This note was completed in part using Dragon voice recognition software. Although reviewed after completion, some word and grammatical errors may occur.              Chuy Archibald MD  11/01/21 4618

## 2021-10-27 NOTE — CONSULTS
[facility]    Orthopedic Consultation    Vandana Gonzalez MRN# [id number]   Age: 61 year old YOB: 1960     Date of Admission: 10/27/2021    Reason for consult: Right hip femoral neck fracture       Requesting physician: Call from ED, Dr Archibald       Level of consult: Consult, follow and place orders           Assessment and Plan:   Assessment:   Right hip femoral neck fracture  Recent left proximal tibia fracture       Plan:   Patient scheduled for a right total hip arthroplasty tomorrow morning  Surgeon Dr Jean Pineda to eat today.  NPO Status effective midnight.    Will have blood prepared for surgery given her anemia   WBAT on the left LE  NWB/Bedrest until postop  Stat covid testing: negative   Hold anticoagulants if taken   Pain medication as needed, minimize narcotics as able           Chief Complaint:   Right hip pain          History of Present Illness:   Vandana Gonzalez is a 61 year old female with history of  anemia who presented to the ED via EMS for evaluation of right hip pain in the setting of mechanical fall that occurred last night. The patient was walking through her kitchen using crutches to go to bed when she slipped on the hardwood floor. She tried catching herself on the kitchen island, however as this was unlocked and the island slid causing her to ultimately fall onto the floor. Patient landed directly on her right hip.  She has persisting hip pain but denies any other concerns.  Ambulates with a cane at baseline.    Patient has vascular compromise the left lower extremity due to Klippel Trenaunay disease.  She believes she has a clot in the left LE in the past.  Patient was seen in July by Dr Pena for an occult tibial plateau fracture.  She has been weight bearing with assistive device.    Xrays confirmed a right total hip arthroplasty          Past Medical History:     Past Medical History:   Diagnosis Date     Closed fracture of unspecified part of  neck of femur      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, unspecified site     large angioma adomen     Hypertensive disorder      Klippel Trenaunay disease     AVM left leg     Phlebitis and thrombophlebitis of other deep vessels of lower extremities     AVM left leg     Stasis ulcer of lower extremity (H) 2012    Left-recurrent             Past Surgical History:     Past Surgical History:   Procedure Laterality Date     BIOPSY OF BREAST, INCISIONAL      phyllodes tumor left     BREAST SURGERY       C LIGATN FEMORAL VEIN      multiple vein strippings left     ESOPHAGOSCOPY, GASTROSCOPY, DUODENOSCOPY (EGD), COMBINED N/A 10/7/2021    Procedure: ESOPHAGOGASTRODUODENOSCOPY (EGD);  Surgeon: Romeo Chavez MD;  Location:  GI     FEMUR SURGERY       left side      ORTHOPEDIC SURGERY      repair broken femur     ZZHC UGI ENDOSCOPY, SIMPLE EXAM      esophageal stricture             Social History:     Social History     Tobacco Use     Smoking status: Former Smoker     Packs/day: 0.25     Years: 2.00     Pack years: 0.50     Types: Cigarettes     Start date: 10/1/1990     Quit date: 10/1/1992     Years since quittin.0     Smokeless tobacco: Never Used     Tobacco comment: Quit    Substance Use Topics     Alcohol use: Yes     Alcohol/week: 0.0 standard drinks     Comment: couple beers per day             Family History:     Family History   Problem Relation Age of Onset     Cancer - colorectal Father      Diabetes Brother              Immunizations:     VACCINE/DOSE   Diptheria   DPT   DTAP   HBIG   Hepatitis A   Hepatitis B   HIB   Influenza   Measles   Meningococcal   MMR   Mumps   Pneumococcal   Polio   Rubella   Small Pox   TDAP   Varicella   Zoster             Allergies:     Allergies   Allergen Reactions     No Known Allergies      Gadolinium Rash             Medications:     No current facility-administered medications for this encounter.  "            Review of Systems:   ROS:  10 point ROS neg other than the symptoms noted above in the HPI.          Physical Exam:   All vitals have been reviewed  Patient Vitals for the past 24 hrs:   BP Temp Temp src Pulse Resp SpO2 Height Weight   10/27/21 1300 -- -- -- -- -- 100 % -- --   10/27/21 1230 126/72 -- -- -- -- 100 % -- --   10/27/21 1225 -- -- -- -- -- 100 % -- --   10/27/21 1224 126/72 -- -- 87 -- -- -- --   10/27/21 1004 -- -- -- -- -- 99 % -- --   10/27/21 1003 -- -- -- -- -- 99 % -- --   10/27/21 1001 -- 98.3  F (36.8  C) Oral -- -- -- -- --   10/27/21 1000 (!) 147/77 -- -- 74 -- 98 % -- --   10/27/21 0949 (!) 141/77 -- Oral 91 18 97 % 1.753 m (5' 9\") 90.7 kg (200 lb)     No intake or output data in the 24 hours ending 10/27/21 1349      Physical Exam   Temp: 98.3  F (36.8  C) Temp src: Oral BP: 126/72 Pulse: 87   Resp: 18 SpO2: 100 % O2 Device: None (Room air)    Vital Signs with Ranges  Temp:  [98.3  F (36.8  C)] 98.3  F (36.8  C)  Pulse:  [74-91] 87  Resp:  [18] 18  BP: (126-147)/(72-77) 126/72  SpO2:  [97 %-100 %] 100 %  200 lbs 0 oz    Constitutional: Pleasant, alert, appropriate, following commands.  HEENT: Head atraumatic normocephalic. Pupils equal round and reactive to light.  Respiratory: Unlabored breathing no audible wheeze  Cardiovascular: Regular rate and rhythm  GI: Abdomen soft nontender nondistended.  Lymph/Hematologic: No lymphadenopathy in areas examined  Genitourinary:  No gutierrez  Skin: No rashes, no cyanosis, no edema.  Musculoskeletal: On physical exam of the right lower extremity, patient's leg is resting in a shortened and externally rotated position.  No skin abrasions seen at hip.  Patient is able to dorsi and plantarflex both ankles with equal resistance.  Full sensation to light touch on the left versus right lower extremities.  Distal pulse intact on right.  Left leg chronic vascular skin changes.  Lymphedema on left.  Darkened toes.      Neurologic: normal without focal " findings, mental status, speech normal, alert and oriented x iii  Neuropsychiatric: stable           Data:   All laboratory data reviewed  Results for orders placed or performed during the hospital encounter of 10/27/21   XR Pelvis w Hip Right 1 View     Status: None    Narrative    XR PELVIS AND HIP RIGHT 1 VIEW 10/27/2021 10:19 AM     HISTORY: right hip      Impression    IMPRESSION: Right femoral neck fracture. Hip joint space width appears  within normal limits.    DOUG RENEE MD         SYSTEM ID:  [id number]   Basic metabolic panel     Status: Abnormal   Result Value Ref Range    Sodium 133 133 - 144 mmol/L    Potassium 4.0 3.4 - 5.3 mmol/L    Chloride 101 94 - 109 mmol/L    Carbon Dioxide (CO2) 24 20 - 32 mmol/L    Anion Gap 8 3 - 14 mmol/L    Urea Nitrogen 11 7 - 30 mg/dL    Creatinine 0.43 (L) 0.52 - 1.04 mg/dL    Calcium 8.6 8.5 - 10.1 mg/dL    Glucose 132 (H) 70 - 99 mg/dL    GFR Estimate >90 >60 mL/min/1.73m2   CBC with platelets and differential     Status: Abnormal   Result Value Ref Range    WBC Count 8.7 4.0 - 11.0 10e3/uL    RBC Count 4.49 3.80 - 5.20 10e6/uL    Hemoglobin 9.3 (L) 11.7 - 15.7 g/dL    Hematocrit 33.3 (L) 35.0 - 47.0 %    MCV 74 (L) 78 - 100 fL    MCH 20.7 (L) 26.5 - 33.0 pg    MCHC 27.9 (L) 31.5 - 36.5 g/dL    RDW      Platelet Count 433 150 - 450 10e3/uL    % Neutrophils 79 %    % Lymphocytes 14 %    % Monocytes 6 %    % Eosinophils 0 %    % Basophils 0 %    % Immature Granulocytes 1 %    NRBCs per 100 WBC 0 <1 /100    Absolute Neutrophils 6.9 1.6 - 8.3 10e3/uL    Absolute Lymphocytes 1.2 0.8 - 5.3 10e3/uL    Absolute Monocytes 0.5 0.0 - 1.3 10e3/uL    Absolute Eosinophils 0.0 0.0 - 0.7 10e3/uL    Absolute Basophils 0.0 0.0 - 0.2 10e3/uL    Absolute Immature Granulocytes 0.1 (H) <=0.0 10e3/uL    Absolute NRBCs 0.0 10e3/uL   Asymptomatic COVID-19 Virus (Coronavirus) by PCR Nasopharyngeal     Status: Normal    Specimen: Nasopharyngeal; Swab   Result Value Ref Range    SARS CoV2 PCR  Negative Negative    Narrative    Testing was performed using the cassia  SARS-CoV-2 & Influenza A/B Assay on the cassia  Nessa  System.  This test should be ordered for the detection of SARS-COV-2 in individuals who meet SARS-CoV-2 clinical and/or epidemiological criteria. Test performance is unknown in asymptomatic patients.  This test is for in vitro diagnostic use under the FDA EUA for laboratories certified under CLIA to perform moderate and/or high complexity testing. This test has not been FDA cleared or approved.  A negative test does not rule out the presence of PCR inhibitors in the specimen or target RNA in concentration below the limit of detection for the assay. The possibility of a false negative should be considered if the patient's recent exposure or clinical presentation suggests COVID-19.  [facility] are certified under the Clinical Laboratory Improvement Amendments of 1988 (CLIA-88) as qualified to perform moderate and/or high complexity laboratory testing.   Adult Type and Screen     Status: None   Result Value Ref Range    ABO/RH(D) O POS     Antibody Screen Negative Negative    SPECIMEN EXPIRATION DATE [id number]    CBC with platelets differential     Status: Abnormal    Narrative    The following orders were created for panel order CBC with platelets differential.  Procedure                               Abnormality         Status                     ---------                               -----------         ------                     CBC with platelets and d...[[id number]]  Abnormal            Final result                 Please view results for these tests on the individual orders.   ABO/Rh type and screen     Status: None    Narrative    The following orders were created for panel order ABO/Rh type and screen.  Procedure                               Abnormality         Status                     ---------                               -----------         ------                      Adult Type and Screen[[id number]]                            Edited Result - FINAL        Please view results for these tests on the individual orders.          Attestation:  I have reviewed today's vital signs, notes, medications, labs and imaging with Dr. Mattson.  Amount of time performed on this consult: 30 minutes.    Lizzie Carlisle PA-C

## 2021-10-27 NOTE — ED NOTES
[facility]  ED Nurse Handoff Report    ED Chief complaint: Hip Pain      ED Diagnosis:   Final diagnoses:   Hip fracture, right, closed, initial encounter (H)       Code Status: Full Code    Allergies:   Allergies   Allergen Reactions     No Known Allergies      Gadolinium Rash       Patient Story: Mechanical Fall at home hurting right hip.  Focused Assessment:  A/Ox4, can make needs known. Right hip pain after fall, shorter than left. Pt. Has left leg wrapped due to lymphedema.    Treatments and/or interventions provided: IV, labs, meds, xray  Patient's response to treatments and/or interventions: Well    To be done/followed up on inpatient unit:  Monitor possible surgery    Does this patient have any cognitive concerns?: None    Activity level - Baseline/Home:  Cane  Activity Level - Current:   Total Care    Patient's Preferred language: English   Needed?: No    Isolation: None  Infection: Not Applicable  Patient tested for COVID 19 prior to admission: YES  Bariatric?: No    Vital Signs:   Vitals:    10/27/21 1003 10/27/21 1004 10/27/21 1224 10/27/21 1225   BP:   126/72    Pulse:   87    Resp:       Temp:       TempSrc:       SpO2: 99% 99%  100%   Weight:       Height:           Cardiac Rhythm:     Was the PSS-3 completed:   Yes  What interventions are required if any?               Family Comments:  at home  OBS brochure/video discussed/provided to patient/family: N/A              Name of person given brochure if not patient: NA              Relationship to patient: NA    For the majority of the shift this patient's behavior was Green.   Behavioral interventions performed were NA.    ED NURSE PHONE NUMBER: Rn 2

## 2021-10-27 NOTE — PHARMACY-ADMISSION MEDICATION HISTORY
Pharmacy Medication History  Admission medication history interview status for the 10/27/2021  admission is complete. See EPIC admission navigator for prior to admission medications     Location of Interview: Patient room  Medication history sources: Patient, Surescripts and Care Everywhere (COVID testing pending, precautions followed)     Significant changes made to the medication list:  Added sucralfate tablet, marked suspension as not taking    In the past week, patient estimated taking medication this percent of the time: greater than 90%    Additional medication history information:   Patient is a reliable historian. Sucralfate suspension not covered by insurance so patient is taking tablets PTA.     Medication reconciliation completed by provider prior to medication history? No    Time spent in this activity: 10 minutes    Prior to Admission medications    Medication Sig Last Dose Taking? Auth Provider   ferrous sulfate (FEROSUL) 325 (65 Fe) MG tablet Take 1 tablet (325 mg) by mouth daily (with breakfast) 10/26/2021 at AM Yes Ned Thomson MD   hydrochlorothiazide (HYDRODIURIL) 12.5 MG tablet Take 1 tablet (12.5 mg) by mouth daily 10/26/2021 at AM Yes Joan Weber MD   pantoprazole (PROTONIX) 40 MG EC tablet Take 1 tablet (40 mg) by mouth 2 times daily 10/26/2021 at PM Yes Chuy Macias PA-C   sucralfate (CARAFATE) 1 GM tablet Take 1 g by mouth 4 times daily 10/26/2021 at 3 doses Yes Unknown, Entered By History   sucralfate (CARAFATE) 1 GM/10ML suspension Take 10 mLs (1 g) by mouth 4 times daily (before meals and nightly)  Patient not taking: Reported on 10/27/2021 Not Taking at Unknown time  Chuy Macias PA-C       The information provided in this note is only as accurate as the sources available at the time of update(s)   Rosa Elena Castro PharmD

## 2021-10-27 NOTE — PROGRESS NOTES
RECEIVING UNIT ED HANDOFF REVIEW    ED Nurse Handoff Report was reviewed by: Mikel Hamilton RN on October 27, 2021 at 1:14 PM

## 2021-10-27 NOTE — PLAN OF CARE
Alert and oriented x4, VSS. Clear lung sounds bilaterally. Denies SOB. Active bowel sounds x4q. Denies nausea and vomiting. Purewick in place; voiding adequately. Edematous and erythemic left leg. Right hip pain, which was managed with PRN oxycodone. On straight bedrest.

## 2021-10-27 NOTE — ED NOTES
Bed: ED06  Expected date:   Expected time:   Means of arrival:   Comments:  [facility] - 437 - 61 F fall hip pain eta 4827

## 2021-10-27 NOTE — ED TRIAGE NOTES
Pt fell last night and hurt right hip after falling. The center island that she was trying to catch herself with was not locked and moved. Pt landed square on right hip.

## 2021-10-28 ENCOUNTER — ANESTHESIA (OUTPATIENT)
Dept: SURGERY | Facility: CLINIC | Age: 61
DRG: 522 | End: 2021-10-28
Payer: COMMERCIAL

## 2021-10-28 ENCOUNTER — APPOINTMENT (OUTPATIENT)
Dept: GENERAL RADIOLOGY | Facility: CLINIC | Age: 61
DRG: 522 | End: 2021-10-28
Attending: PHYSICIAN ASSISTANT
Payer: COMMERCIAL

## 2021-10-28 ENCOUNTER — ANESTHESIA EVENT (OUTPATIENT)
Dept: SURGERY | Facility: CLINIC | Age: 61
DRG: 522 | End: 2021-10-28
Payer: COMMERCIAL

## 2021-10-28 ENCOUNTER — APPOINTMENT (OUTPATIENT)
Dept: GENERAL RADIOLOGY | Facility: CLINIC | Age: 61
DRG: 522 | End: 2021-10-28
Attending: ORTHOPAEDIC SURGERY
Payer: COMMERCIAL

## 2021-10-28 LAB
ANION GAP SERPL CALCULATED.3IONS-SCNC: 5 MMOL/L (ref 3–14)
BLD PROD TYP BPU: NORMAL
BLOOD COMPONENT TYPE: NORMAL
BUN SERPL-MCNC: 4 MG/DL (ref 7–30)
CALCIUM SERPL-MCNC: 8.3 MG/DL (ref 8.5–10.1)
CHLORIDE BLD-SCNC: 107 MMOL/L (ref 94–109)
CO2 SERPL-SCNC: 24 MMOL/L (ref 20–32)
CODING SYSTEM: NORMAL
CREAT SERPL-MCNC: 0.39 MG/DL (ref 0.52–1.04)
CROSSMATCH: NORMAL
ERYTHROCYTE [DISTWIDTH] IN BLOOD BY AUTOMATED COUNT: ABNORMAL %
GFR SERPL CREATININE-BSD FRML MDRD: >90 ML/MIN/1.73M2
GLUCOSE BLD-MCNC: 108 MG/DL (ref 70–99)
GLUCOSE BLDC GLUCOMTR-MCNC: 102 MG/DL (ref 70–99)
GLUCOSE BLDC GLUCOMTR-MCNC: 111 MG/DL (ref 70–99)
HCT VFR BLD AUTO: 31.4 % (ref 35–47)
HGB BLD-MCNC: 9 G/DL (ref 11.7–15.7)
HGB BLD-MCNC: 9 G/DL (ref 11.7–15.7)
MCH RBC QN AUTO: 21.7 PG (ref 26.5–33)
MCHC RBC AUTO-ENTMCNC: 28.7 G/DL (ref 31.5–36.5)
MCV RBC AUTO: 76 FL (ref 78–100)
PLATELET # BLD AUTO: 343 10E3/UL (ref 150–450)
POTASSIUM BLD-SCNC: 3.6 MMOL/L (ref 3.4–5.3)
RBC # BLD AUTO: 4.14 10E6/UL (ref 3.8–5.2)
SODIUM SERPL-SCNC: 136 MMOL/L (ref 133–144)
UNIT ABO/RH: NORMAL
UNIT NUMBER: NORMAL
UNIT STATUS: NORMAL
UNIT TYPE ISBT: 5100
WBC # BLD AUTO: 5.6 10E3/UL (ref 4–11)

## 2021-10-28 PROCEDURE — 36415 COLL VENOUS BLD VENIPUNCTURE: CPT | Performed by: PHYSICIAN ASSISTANT

## 2021-10-28 PROCEDURE — 710N000009 HC RECOVERY PHASE 1, LEVEL 1, PER MIN: Performed by: ORTHOPAEDIC SURGERY

## 2021-10-28 PROCEDURE — 85018 HEMOGLOBIN: CPT | Performed by: PHYSICIAN ASSISTANT

## 2021-10-28 PROCEDURE — 250N000011 HC RX IP 250 OP 636: Performed by: PHYSICIAN ASSISTANT

## 2021-10-28 PROCEDURE — 250N000013 HC RX MED GY IP 250 OP 250 PS 637: Performed by: PHYSICIAN ASSISTANT

## 2021-10-28 PROCEDURE — 250N000011 HC RX IP 250 OP 636: Performed by: ANESTHESIOLOGY

## 2021-10-28 PROCEDURE — 272N000001 HC OR GENERAL SUPPLY STERILE: Performed by: ORTHOPAEDIC SURGERY

## 2021-10-28 PROCEDURE — 360N000084 HC SURGERY LEVEL 4 W/ FLUORO, PER MIN: Performed by: ORTHOPAEDIC SURGERY

## 2021-10-28 PROCEDURE — 258N000003 HC RX IP 258 OP 636: Performed by: NURSE ANESTHETIST, CERTIFIED REGISTERED

## 2021-10-28 PROCEDURE — 120N000001 HC R&B MED SURG/OB

## 2021-10-28 PROCEDURE — 278N000051 HC OR IMPLANT GENERAL: Performed by: ORTHOPAEDIC SURGERY

## 2021-10-28 PROCEDURE — 999N000179 XR SURGERY CARM FLUORO LESS THAN 5 MIN W STILLS

## 2021-10-28 PROCEDURE — 80048 BASIC METABOLIC PNL TOTAL CA: CPT | Performed by: PHYSICIAN ASSISTANT

## 2021-10-28 PROCEDURE — 999N000063 XR PELVIS AD HIP PORTABLE RIGHT 1 VIEW

## 2021-10-28 PROCEDURE — 0SR9049 REPLACEMENT OF RIGHT HIP JOINT WITH CERAMIC ON POLYETHYLENE SYNTHETIC SUBSTITUTE, CEMENTED, OPEN APPROACH: ICD-10-PCS | Performed by: ORTHOPAEDIC SURGERY

## 2021-10-28 PROCEDURE — 258N000003 HC RX IP 258 OP 636: Performed by: ANESTHESIOLOGY

## 2021-10-28 PROCEDURE — C1713 ANCHOR/SCREW BN/BN,TIS/BN: HCPCS | Performed by: ORTHOPAEDIC SURGERY

## 2021-10-28 PROCEDURE — 250N000025 HC SEVOFLURANE, PER MIN: Performed by: ORTHOPAEDIC SURGERY

## 2021-10-28 PROCEDURE — 85027 COMPLETE CBC AUTOMATED: CPT | Performed by: PHYSICIAN ASSISTANT

## 2021-10-28 PROCEDURE — 258N000003 HC RX IP 258 OP 636: Performed by: INTERNAL MEDICINE

## 2021-10-28 PROCEDURE — 999N000141 HC STATISTIC PRE-PROCEDURE NURSING ASSESSMENT: Performed by: ORTHOPAEDIC SURGERY

## 2021-10-28 PROCEDURE — 250N000011 HC RX IP 250 OP 636: Performed by: NURSE ANESTHETIST, CERTIFIED REGISTERED

## 2021-10-28 PROCEDURE — G0463 HOSPITAL OUTPT CLINIC VISIT: HCPCS

## 2021-10-28 PROCEDURE — C1776 JOINT DEVICE (IMPLANTABLE): HCPCS | Performed by: ORTHOPAEDIC SURGERY

## 2021-10-28 PROCEDURE — P9041 ALBUMIN (HUMAN),5%, 50ML: HCPCS | Performed by: NURSE ANESTHETIST, CERTIFIED REGISTERED

## 2021-10-28 PROCEDURE — 86923 COMPATIBILITY TEST ELECTRIC: CPT | Performed by: INTERNAL MEDICINE

## 2021-10-28 PROCEDURE — 258N000003 HC RX IP 258 OP 636: Performed by: PHYSICIAN ASSISTANT

## 2021-10-28 PROCEDURE — 370N000017 HC ANESTHESIA TECHNICAL FEE, PER MIN: Performed by: ORTHOPAEDIC SURGERY

## 2021-10-28 PROCEDURE — 250N000009 HC RX 250: Performed by: ORTHOPAEDIC SURGERY

## 2021-10-28 PROCEDURE — 250N000009 HC RX 250: Performed by: NURSE ANESTHETIST, CERTIFIED REGISTERED

## 2021-10-28 PROCEDURE — 99233 SBSQ HOSP IP/OBS HIGH 50: CPT | Performed by: INTERNAL MEDICINE

## 2021-10-28 PROCEDURE — 258N000001 HC RX 258: Performed by: ORTHOPAEDIC SURGERY

## 2021-10-28 PROCEDURE — 250N000011 HC RX IP 250 OP 636: Performed by: INTERNAL MEDICINE

## 2021-10-28 DEVICE — IMPLANTABLE DEVICE: Type: IMPLANTABLE DEVICE | Site: HIP | Status: FUNCTIONAL

## 2021-10-28 DEVICE — BUCK FEMORAL CEMENT RESTRICTOR 25MM
Type: IMPLANTABLE DEVICE | Site: HIP | Status: FUNCTIONAL
Brand: BUCK

## 2021-10-28 DEVICE — FULL DOSE BONE CEMENT, 10 PACK CATALOG NUMBER IS 6191-1-010
Type: IMPLANTABLE DEVICE | Site: HIP | Status: FUNCTIONAL
Brand: SIMPLEX

## 2021-10-28 DEVICE — IMPLANTABLE DEVICE
Type: IMPLANTABLE DEVICE | Site: HIP | Status: FUNCTIONAL
Brand: G7® ACETABULAR SYSTEM

## 2021-10-28 DEVICE — IMPLANTABLE DEVICE
Type: IMPLANTABLE DEVICE | Site: HIP | Status: FUNCTIONAL
Brand: G7 ACETABULAR LINER

## 2021-10-28 DEVICE — IMP HEAD FEM ZIM BIOLOX DELTA CER 36MM  -3.5 00-8775-036-01: Type: IMPLANTABLE DEVICE | Site: HIP | Status: FUNCTIONAL

## 2021-10-28 RX ORDER — GLYCOPYRROLATE 0.2 MG/ML
INJECTION, SOLUTION INTRAMUSCULAR; INTRAVENOUS PRN
Status: DISCONTINUED | OUTPATIENT
Start: 2021-10-28 | End: 2021-10-28

## 2021-10-28 RX ORDER — POLYETHYLENE GLYCOL 3350 17 G/17G
1 POWDER, FOR SOLUTION ORAL DAILY
Qty: 7 PACKET | Refills: 0 | Status: SHIPPED | OUTPATIENT
Start: 2021-10-28 | End: 2022-02-28

## 2021-10-28 RX ORDER — HYDROMORPHONE HCL IN WATER/PF 6 MG/30 ML
0.2 PATIENT CONTROLLED ANALGESIA SYRINGE INTRAVENOUS EVERY 5 MIN PRN
Status: DISCONTINUED | OUTPATIENT
Start: 2021-10-28 | End: 2021-10-28 | Stop reason: HOSPADM

## 2021-10-28 RX ORDER — AMOXICILLIN 250 MG
1 CAPSULE ORAL 2 TIMES DAILY
Status: DISCONTINUED | OUTPATIENT
Start: 2021-10-28 | End: 2021-10-30 | Stop reason: HOSPADM

## 2021-10-28 RX ORDER — AMOXICILLIN 250 MG
1-2 CAPSULE ORAL 2 TIMES DAILY
Qty: 30 TABLET | Refills: 0 | Status: SHIPPED | OUTPATIENT
Start: 2021-10-28 | End: 2022-02-28

## 2021-10-28 RX ORDER — SODIUM CHLORIDE, SODIUM LACTATE, POTASSIUM CHLORIDE, CALCIUM CHLORIDE 600; 310; 30; 20 MG/100ML; MG/100ML; MG/100ML; MG/100ML
INJECTION, SOLUTION INTRAVENOUS CONTINUOUS
Status: DISCONTINUED | OUTPATIENT
Start: 2021-10-28 | End: 2021-10-28 | Stop reason: HOSPADM

## 2021-10-28 RX ORDER — OXYCODONE HYDROCHLORIDE 5 MG/1
10 TABLET ORAL EVERY 4 HOURS PRN
Status: DISCONTINUED | OUTPATIENT
Start: 2021-10-28 | End: 2021-10-30 | Stop reason: HOSPADM

## 2021-10-28 RX ORDER — LIDOCAINE HYDROCHLORIDE 20 MG/ML
INJECTION, SOLUTION INFILTRATION; PERINEURAL PRN
Status: DISCONTINUED | OUTPATIENT
Start: 2021-10-28 | End: 2021-10-28

## 2021-10-28 RX ORDER — ASPIRIN 81 MG/1
81 TABLET ORAL 2 TIMES DAILY
Qty: 60 TABLET | Refills: 0 | Status: SHIPPED | OUTPATIENT
Start: 2021-10-28 | End: 2022-02-28

## 2021-10-28 RX ORDER — EPHEDRINE SULFATE 50 MG/ML
INJECTION, SOLUTION INTRAMUSCULAR; INTRAVENOUS; SUBCUTANEOUS PRN
Status: DISCONTINUED | OUTPATIENT
Start: 2021-10-28 | End: 2021-10-28

## 2021-10-28 RX ORDER — SODIUM CHLORIDE, SODIUM LACTATE, POTASSIUM CHLORIDE, CALCIUM CHLORIDE 600; 310; 30; 20 MG/100ML; MG/100ML; MG/100ML; MG/100ML
INJECTION, SOLUTION INTRAVENOUS CONTINUOUS
Status: DISCONTINUED | OUTPATIENT
Start: 2021-10-28 | End: 2021-10-29

## 2021-10-28 RX ORDER — ALBUMIN, HUMAN INJ 5% 5 %
SOLUTION INTRAVENOUS CONTINUOUS PRN
Status: DISCONTINUED | OUTPATIENT
Start: 2021-10-28 | End: 2021-10-28

## 2021-10-28 RX ORDER — PROPOFOL 10 MG/ML
INJECTION, EMULSION INTRAVENOUS PRN
Status: DISCONTINUED | OUTPATIENT
Start: 2021-10-28 | End: 2021-10-28

## 2021-10-28 RX ORDER — PROCHLORPERAZINE MALEATE 5 MG
10 TABLET ORAL EVERY 6 HOURS PRN
Status: DISCONTINUED | OUTPATIENT
Start: 2021-10-28 | End: 2021-10-30 | Stop reason: HOSPADM

## 2021-10-28 RX ORDER — HYDROMORPHONE HCL IN WATER/PF 6 MG/30 ML
0.2 PATIENT CONTROLLED ANALGESIA SYRINGE INTRAVENOUS
Status: DISCONTINUED | OUTPATIENT
Start: 2021-10-28 | End: 2021-10-30 | Stop reason: HOSPADM

## 2021-10-28 RX ORDER — FENTANYL CITRATE 0.05 MG/ML
25 INJECTION, SOLUTION INTRAMUSCULAR; INTRAVENOUS EVERY 5 MIN PRN
Status: DISCONTINUED | OUTPATIENT
Start: 2021-10-28 | End: 2021-10-28 | Stop reason: HOSPADM

## 2021-10-28 RX ORDER — ONDANSETRON 4 MG/1
4 TABLET, ORALLY DISINTEGRATING ORAL EVERY 30 MIN PRN
Status: DISCONTINUED | OUTPATIENT
Start: 2021-10-28 | End: 2021-10-28 | Stop reason: HOSPADM

## 2021-10-28 RX ORDER — CEFAZOLIN SODIUM 2 G/100ML
2 INJECTION, SOLUTION INTRAVENOUS EVERY 8 HOURS
Status: COMPLETED | OUTPATIENT
Start: 2021-10-28 | End: 2021-10-29

## 2021-10-28 RX ORDER — ONDANSETRON 2 MG/ML
4 INJECTION INTRAMUSCULAR; INTRAVENOUS EVERY 6 HOURS PRN
Status: DISCONTINUED | OUTPATIENT
Start: 2021-10-28 | End: 2021-10-30 | Stop reason: HOSPADM

## 2021-10-28 RX ORDER — ACETAMINOPHEN 325 MG/1
975 TABLET ORAL EVERY 8 HOURS
Status: DISCONTINUED | OUTPATIENT
Start: 2021-10-28 | End: 2021-10-30 | Stop reason: HOSPADM

## 2021-10-28 RX ORDER — DEXAMETHASONE SODIUM PHOSPHATE 4 MG/ML
INJECTION, SOLUTION INTRA-ARTICULAR; INTRALESIONAL; INTRAMUSCULAR; INTRAVENOUS; SOFT TISSUE PRN
Status: DISCONTINUED | OUTPATIENT
Start: 2021-10-28 | End: 2021-10-28

## 2021-10-28 RX ORDER — OXYCODONE HYDROCHLORIDE 5 MG/1
5 TABLET ORAL EVERY 4 HOURS PRN
Status: DISCONTINUED | OUTPATIENT
Start: 2021-10-28 | End: 2021-10-28 | Stop reason: HOSPADM

## 2021-10-28 RX ORDER — FENTANYL CITRATE 50 UG/ML
INJECTION, SOLUTION INTRAMUSCULAR; INTRAVENOUS PRN
Status: DISCONTINUED | OUTPATIENT
Start: 2021-10-28 | End: 2021-10-28

## 2021-10-28 RX ORDER — POLYETHYLENE GLYCOL 3350 17 G/17G
17 POWDER, FOR SOLUTION ORAL DAILY
Status: DISCONTINUED | OUTPATIENT
Start: 2021-10-29 | End: 2021-10-30 | Stop reason: HOSPADM

## 2021-10-28 RX ORDER — ONDANSETRON 2 MG/ML
INJECTION INTRAMUSCULAR; INTRAVENOUS PRN
Status: DISCONTINUED | OUTPATIENT
Start: 2021-10-28 | End: 2021-10-28

## 2021-10-28 RX ORDER — ONDANSETRON 4 MG/1
4 TABLET, ORALLY DISINTEGRATING ORAL EVERY 6 HOURS PRN
Status: DISCONTINUED | OUTPATIENT
Start: 2021-10-28 | End: 2021-10-30 | Stop reason: HOSPADM

## 2021-10-28 RX ORDER — ONDANSETRON 2 MG/ML
4 INJECTION INTRAMUSCULAR; INTRAVENOUS EVERY 30 MIN PRN
Status: DISCONTINUED | OUTPATIENT
Start: 2021-10-28 | End: 2021-10-28 | Stop reason: HOSPADM

## 2021-10-28 RX ORDER — ACETAMINOPHEN 325 MG/1
650 TABLET ORAL EVERY 4 HOURS PRN
Status: DISCONTINUED | OUTPATIENT
Start: 2021-10-31 | End: 2021-10-30 | Stop reason: HOSPADM

## 2021-10-28 RX ORDER — OXYCODONE HYDROCHLORIDE 5 MG/1
5 TABLET ORAL EVERY 4 HOURS PRN
Status: DISCONTINUED | OUTPATIENT
Start: 2021-10-28 | End: 2021-10-30 | Stop reason: HOSPADM

## 2021-10-28 RX ORDER — NEOSTIGMINE METHYLSULFATE 1 MG/ML
VIAL (ML) INJECTION PRN
Status: DISCONTINUED | OUTPATIENT
Start: 2021-10-28 | End: 2021-10-28

## 2021-10-28 RX ORDER — METHOCARBAMOL 750 MG/1
750 TABLET, FILM COATED ORAL EVERY 6 HOURS PRN
Status: DISCONTINUED | OUTPATIENT
Start: 2021-10-28 | End: 2021-10-30 | Stop reason: HOSPADM

## 2021-10-28 RX ORDER — LIDOCAINE 40 MG/G
CREAM TOPICAL
Status: DISCONTINUED | OUTPATIENT
Start: 2021-10-28 | End: 2021-10-30 | Stop reason: HOSPADM

## 2021-10-28 RX ORDER — HYDROMORPHONE HCL IN WATER/PF 6 MG/30 ML
0.4 PATIENT CONTROLLED ANALGESIA SYRINGE INTRAVENOUS
Status: DISCONTINUED | OUTPATIENT
Start: 2021-10-28 | End: 2021-10-30 | Stop reason: HOSPADM

## 2021-10-28 RX ORDER — BISACODYL 10 MG
10 SUPPOSITORY, RECTAL RECTAL DAILY PRN
Status: DISCONTINUED | OUTPATIENT
Start: 2021-10-28 | End: 2021-10-30 | Stop reason: HOSPADM

## 2021-10-28 RX ORDER — ACETAMINOPHEN 325 MG/1
650 TABLET ORAL EVERY 4 HOURS PRN
Qty: 100 TABLET | Refills: 0 | Status: SHIPPED | OUTPATIENT
Start: 2021-10-28 | End: 2022-02-28

## 2021-10-28 RX ORDER — SODIUM CHLORIDE, SODIUM LACTATE, POTASSIUM CHLORIDE, CALCIUM CHLORIDE 600; 310; 30; 20 MG/100ML; MG/100ML; MG/100ML; MG/100ML
INJECTION, SOLUTION INTRAVENOUS CONTINUOUS
Status: CANCELLED | OUTPATIENT
Start: 2021-10-28

## 2021-10-28 RX ORDER — MAGNESIUM HYDROXIDE 1200 MG/15ML
LIQUID ORAL PRN
Status: DISCONTINUED | OUTPATIENT
Start: 2021-10-28 | End: 2021-10-28 | Stop reason: HOSPADM

## 2021-10-28 RX ORDER — ASPIRIN 81 MG/1
81 TABLET ORAL 2 TIMES DAILY
Status: DISCONTINUED | OUTPATIENT
Start: 2021-10-28 | End: 2021-10-30 | Stop reason: HOSPADM

## 2021-10-28 RX ADMIN — ASPIRIN 81 MG: 81 TABLET, COATED ORAL at 20:18

## 2021-10-28 RX ADMIN — FENTANYL CITRATE 25 MCG: 50 INJECTION INTRAMUSCULAR; INTRAVENOUS at 12:52

## 2021-10-28 RX ADMIN — DEXAMETHASONE SODIUM PHOSPHATE 4 MG: 4 INJECTION, SOLUTION INTRA-ARTICULAR; INTRALESIONAL; INTRAMUSCULAR; INTRAVENOUS; SOFT TISSUE at 09:30

## 2021-10-28 RX ADMIN — PHENYLEPHRINE HYDROCHLORIDE 200 MCG: 10 INJECTION INTRAVENOUS at 11:20

## 2021-10-28 RX ADMIN — TRANEXAMIC ACID 1 G: 10 INJECTION, SOLUTION INTRAVENOUS at 11:26

## 2021-10-28 RX ADMIN — HYDROCHLOROTHIAZIDE 12.5 MG: 12.5 TABLET ORAL at 08:02

## 2021-10-28 RX ADMIN — NEOSTIGMINE METHYLSULFATE 4.5 MG: 1 INJECTION, SOLUTION INTRAVENOUS at 11:26

## 2021-10-28 RX ADMIN — ROCURONIUM BROMIDE 50 MG: 50 INJECTION, SOLUTION INTRAVENOUS at 09:30

## 2021-10-28 RX ADMIN — CEFAZOLIN SODIUM 2 G: 2 INJECTION, SOLUTION INTRAVENOUS at 16:43

## 2021-10-28 RX ADMIN — PHENYLEPHRINE HYDROCHLORIDE 100 MCG: 10 INJECTION INTRAVENOUS at 10:48

## 2021-10-28 RX ADMIN — PROPOFOL 180 MG: 10 INJECTION, EMULSION INTRAVENOUS at 09:30

## 2021-10-28 RX ADMIN — SODIUM CHLORIDE, POTASSIUM CHLORIDE, SODIUM LACTATE AND CALCIUM CHLORIDE: 600; 310; 30; 20 INJECTION, SOLUTION INTRAVENOUS at 16:32

## 2021-10-28 RX ADMIN — PHENYLEPHRINE HYDROCHLORIDE 100 MCG: 10 INJECTION INTRAVENOUS at 09:54

## 2021-10-28 RX ADMIN — GLYCOPYRROLATE 0.6 MG: 0.2 INJECTION, SOLUTION INTRAMUSCULAR; INTRAVENOUS at 11:26

## 2021-10-28 RX ADMIN — ACETAMINOPHEN 975 MG: 325 TABLET, FILM COATED ORAL at 16:42

## 2021-10-28 RX ADMIN — PHENYLEPHRINE HYDROCHLORIDE 100 MCG: 10 INJECTION INTRAVENOUS at 10:59

## 2021-10-28 RX ADMIN — CEFAZOLIN SODIUM 2 G: 2 INJECTION, SOLUTION INTRAVENOUS at 09:25

## 2021-10-28 RX ADMIN — ALBUMIN HUMAN: 0.05 INJECTION, SOLUTION INTRAVENOUS at 11:13

## 2021-10-28 RX ADMIN — SENNOSIDES AND DOCUSATE SODIUM 1 TABLET: 8.6; 5 TABLET ORAL at 20:18

## 2021-10-28 RX ADMIN — SUCRALFATE 1 G: 1 TABLET ORAL at 22:01

## 2021-10-28 RX ADMIN — TRANEXAMIC ACID 1 G: 10 INJECTION, SOLUTION INTRAVENOUS at 09:21

## 2021-10-28 RX ADMIN — OXYCODONE HYDROCHLORIDE 5 MG: 5 TABLET ORAL at 06:06

## 2021-10-28 RX ADMIN — OXYCODONE HYDROCHLORIDE 5 MG: 5 TABLET ORAL at 22:02

## 2021-10-28 RX ADMIN — OXYCODONE HYDROCHLORIDE 5 MG: 5 TABLET ORAL at 00:47

## 2021-10-28 RX ADMIN — Medication 5 MG: at 11:36

## 2021-10-28 RX ADMIN — Medication 5 MG: at 11:39

## 2021-10-28 RX ADMIN — ONDANSETRON 4 MG: 2 INJECTION INTRAMUSCULAR; INTRAVENOUS at 11:26

## 2021-10-28 RX ADMIN — HYDROMORPHONE HYDROCHLORIDE 0.5 MG: 1 INJECTION, SOLUTION INTRAMUSCULAR; INTRAVENOUS; SUBCUTANEOUS at 10:09

## 2021-10-28 RX ADMIN — FENTANYL CITRATE 25 MCG: 50 INJECTION INTRAMUSCULAR; INTRAVENOUS at 12:25

## 2021-10-28 RX ADMIN — SODIUM CHLORIDE, POTASSIUM CHLORIDE, SODIUM LACTATE AND CALCIUM CHLORIDE: 600; 310; 30; 20 INJECTION, SOLUTION INTRAVENOUS at 09:21

## 2021-10-28 RX ADMIN — ACETAMINOPHEN 975 MG: 325 TABLET, FILM COATED ORAL at 22:01

## 2021-10-28 RX ADMIN — PHENYLEPHRINE HYDROCHLORIDE 100 MCG: 10 INJECTION INTRAVENOUS at 10:29

## 2021-10-28 RX ADMIN — MIDAZOLAM 2 MG: 1 INJECTION INTRAMUSCULAR; INTRAVENOUS at 09:21

## 2021-10-28 RX ADMIN — SUCRALFATE 1 G: 1 TABLET ORAL at 18:15

## 2021-10-28 RX ADMIN — FENTANYL CITRATE 100 MCG: 50 INJECTION, SOLUTION INTRAMUSCULAR; INTRAVENOUS at 09:30

## 2021-10-28 RX ADMIN — LIDOCAINE HYDROCHLORIDE 100 MG: 20 INJECTION, SOLUTION INFILTRATION; PERINEURAL at 09:30

## 2021-10-28 RX ADMIN — METHOCARBAMOL 500 MG: 500 TABLET ORAL at 00:47

## 2021-10-28 RX ADMIN — FENTANYL CITRATE 25 MCG: 50 INJECTION INTRAMUSCULAR; INTRAVENOUS at 13:02

## 2021-10-28 RX ADMIN — FENTANYL CITRATE 25 MCG: 50 INJECTION INTRAMUSCULAR; INTRAVENOUS at 12:42

## 2021-10-28 RX ADMIN — PANTOPRAZOLE SODIUM 40 MG: 40 TABLET, DELAYED RELEASE ORAL at 20:18

## 2021-10-28 RX ADMIN — Medication 1 MG: at 00:47

## 2021-10-28 RX ADMIN — PHENYLEPHRINE HYDROCHLORIDE 100 MCG: 10 INJECTION INTRAVENOUS at 11:11

## 2021-10-28 RX ADMIN — ACETAMINOPHEN 975 MG: 325 TABLET, FILM COATED ORAL at 06:06

## 2021-10-28 ASSESSMENT — ACTIVITIES OF DAILY LIVING (ADL)
ADLS_ACUITY_SCORE: 20
ADLS_ACUITY_SCORE: 17
ADLS_ACUITY_SCORE: 20
ADLS_ACUITY_SCORE: 17
ADLS_ACUITY_SCORE: 20
ADLS_ACUITY_SCORE: 17
ADLS_ACUITY_SCORE: 20
ADLS_ACUITY_SCORE: 17
ADLS_ACUITY_SCORE: 17
ADLS_ACUITY_SCORE: 20
ADLS_ACUITY_SCORE: 20

## 2021-10-28 ASSESSMENT — LIFESTYLE VARIABLES: TOBACCO_USE: 0

## 2021-10-28 NOTE — CONSULTS
[facility] WOC Nurse Inpatient Wound Assessment   Reason for consultation: LLE wound     Assessment  Left lower anterior leg wound due to Lymphedema  Status: initial assessment, superficial, no local signs of infection    Treatment Plan: also transcribed in AVS   Left lower anterior leg wound: Monday/Wednesday/Friday  And PRN   1. Cleanse wound with NS or wound cleanser (MicroKlenz #665353)  2.   Dry and protect surrounding skin with no sting barrier film wipe #[id number]  3.   Cover with silicone foam dressing Mepilex  4x4 #[id number]    Pressure Injury prevention (please order supplies if not in room)  1. Turn every 2 hours, side to side avoid supine on pressure redistribution support surface   2.   Float heels off bed with use of pillows under legs, if ineffective, order offloading boots: Heel Lift boots (Prevalon #887881)  3.   If incontinent Cleanse with incontinent cleanser (Sukumar spray # [id number]) followed by skin barrier protectant (Critic Aid paste #87411)  BID and after each incontinent episode  4.   Prevent sliding and shear by limiting HOB to 30 degrees or less unless contraindicated, use knee gatch first if not contraindicated  5.   If sitting, use pressure redistribution chair cushion (#041934); if unable to shift weight every hour, please limit sitting to an hour at a time  6.   Protective foam dressings PRN,Change at least q 4 days, peel back, peek and replace for daily skin inspection.  7.   Optimize nutrition for healing    Wound Care Rationale Protect periwound skin, Promote moist wound healing without tissue dehydration , Provide protection  and Pain reduction  Orders Written  Recommended provider order: Lymphedema follow up-If compression is desired while in hospital, please consult PT-Lymphedema for evaluation  WOC Nurse follow-up plan while in the hospital:weekly  Nursing to notify the Provider(s) and re-consult the WOC Nurse if wound(s) deteriorates or new skin  concern.    Patient History  According to provider note(s):  61 year old female with history of  anemia who presented to the ED via EMS for evaluation of right hip pain in the setting of mechanical fall that occurred last night (10/26/2021). Ambulates with a cane at baseline.    Patient has vascular compromise the left lower extremity due to Klippel Trenaunay disease.  She believes she has a clot in the left LE in the past.  Patient was seen in July by Dr Pena for an occult tibial plateau fracture.  She has been weight bearing with assistive device.    Xrays confirmed a right total hip arthroplasty. Post-operative diagnosis: right femoral neck fracture. S/P ARTHROPLASTY, RIGHT TOTAL HIP, DIRECT ANTERIOR APPROACH 10/28/2021 by Dr. Pena, Jimmy Levy MD     Data  Documented Allergies: No known allergies and Gadolinium     Recent Labs   Lab Test 10/28/21  1233 10/28/21  0750 10/28/21  0750 10/27/21  0956 10/14/21  1011 10/07/21  1534 10/07/21  0554   ALBUMIN  --   --   --   --  3.4  --   --    HGB 9.0*   < > 9.0*   < > 9.5*   < > 6.1*   INR  --   --   --   --   --   --  1.12   WBC  --   --  5.6   < > 5.3  --  4.1    < > = values in this interval not displayed.     Recent Labs   Lab 10/28/21  0750 10/28/21  0610 10/28/21  0239 10/27/21  0956   * 102* 111* 132*       Temperature: Temp (24hrs), Av.2  F (36.8  C), Min:97.2  F (36.2  C), Max:99.5  F (37.5  C)      Intake/Output Summary (Last 24 hours) at 10/28/2021 1759  Last data filed at 10/28/2021 1141  Gross per 24 hour   Intake 1260 ml   Output 1350 ml   Net -90 ml       Orders Placed This Encounter      Advance Diet as Tolerated: Regular Diet Adult      Regular Diet Adult      Containment of urine/stool: Continent of bladder and Continent of bowel   Medical Devices:Lemons Catheter: Not present    Catheter securement Not applicable    Pressure Injury Risk Assessment (Ruben Scale):  Sensory Perception: 4-->no impairment    Moisture:  3-->occasionally moist   Activity: 1-->bedfast     Mobility: 2-->very limited   Nutrition: 3-->adequate   Friction and Shear: 2-->potential problem  Ruben Score: 15    Focused WOC Nurse Skin/Wound Exam:   Subjective/Objective/History: Alert, pleasant, states wounds come and go with lymphedema. Does not see Lymphedema specialist at the moment but is receptive to resume follow up once her new insurance kicks in    Date of last photo 10/28/2021      Wound Location:  Left lower anterior leg 10/28/2021    Measurements (length x width x depth, in cm) 8  x 7  x  0.02 cm   Wound Base: 20 % dermis and 80 % blanchable erythema and moist desquamation, hyperkeratotic skin  Tunneling up to NA  Undermining N/A  Palpation of the wound bed: normal   Periwound skin: denuded and erythema- blanchable, hemosiderin staining  Periwound Temperature: normal   Drainage:, small  Description of drainage: yellow  Odor: none  Pain: absent,   Local signs of infection: none  LE Condition: normal (same as Right leg), pitting edema    Interventions  Visual inspection and assessment completed   Wound care completed  Pressure redistribution Support surface: Standard  Atmos Air mattress    Off-loading measures in bed: reposition side to side with use of Pillows  Off-loading measures for heels: Pillows under calves  Off-loading measures for chair: Chair cushion as needed once sitting  Orders with supply numbers placed in orders for nursing staff  Order transcribed in AVS   Education provided: importance of repositioning and wound progress  Discussed plan of care with Patient and Nurse    Felisha Godinez MS RN CWOCN

## 2021-10-28 NOTE — ANESTHESIA CARE TRANSFER NOTE
Patient: Vandana Gonzalez    Procedure: Procedure(s):  ARTHROPLASTY, RIGHT TOTAL HIP, DIRECT ANTERIOR APPROACH       Diagnosis: Hip fracture (H) [S72.009A]  Diagnosis Additional Information: No value filed.    Anesthesia Type:   General     Note:    Oropharynx: oropharynx clear of all foreign objects and spontaneously breathing  Level of Consciousness: drowsy  Oxygen Supplementation: face mask  Level of Supplemental Oxygen (L/min / FiO2): 6  Independent Airway: airway patency satisfactory and stable  Dentition: dentition unchanged  Vital Signs Stable: post-procedure vital signs reviewed and stable  Report to RN Given: handoff report given    Comments: Neuromuscular blockade reversed after TOF 4/4, spontaneous respirations, adequate tidal volumes, followed commands to voice, oropharynx suctioned with soft flexible catheter, extubated atraumatically, extubated with suction, airway patent after extubation.  Oxygen via facemask at 6 liters per minute to PACU. Oxygen tubing connected to wall O2 in PACU, SpO2, NiBP, and EKG monitors and alarms on and functioning, report on patient's clinical status given to PACU RN, RN questions answered.           Vitals:  Vitals Value Taken Time   /77 10/28/21 1157   Temp     Pulse 69 10/28/21 1200   Resp 17 10/28/21 1200   SpO2 100 % 10/28/21 1200   Vitals shown include unvalidated device data.    Electronically Signed By: NIMCO Hightower CRNA  October 28, 2021  12:00 PM

## 2021-10-28 NOTE — OP NOTE
10/28/2021    Vandana Gonzalez    Preoperative diagnosis: femoral neck fracture right hip    Postoperative diagnosis: Same    Procedure: Total hip replacement right hip, direct anterior approach    Anesthesia: Gen.    Surgeon: Dr. Jimmy Pena    Asst.: Beatriz Sue PA-C    Description of procedure:  The patient is brought to the operating, supine upon a gurney.Preoperative antibiotic were given, as well as tranexamic acid.Gen. Anesthesia was induced. A Lemons catheter was placed. The patient was placed in the supine position on the Lillian table.  Preoperative  views of both hips are made.    The right lower extremity, flank, and anterior abdomen are prepped and draped in customary fashion. A brief timeout was held verify the procedure and laterality.    A slightly oblique incision was made over the tensor fascia zach muscle. Dissection was carried out over the muscular fascia. The muscle was dissected off the fascia,and retracted laterally. The circumflex vessels were identified,cauterized, and divided.Cobra retractors were placed superiorly and inferiorly around the femoral neck. The hip capsule was opened in a T fashion, and the anterior capsule was excised. The Cobra retractors were now placed directly around the femoral neck. The osteotomy was made with an oscillating saw, just below the fracture and the femoral head and neck were extracted.A power corkscrew was utilized. During the osteotomy and extraction of the femoral head,a small amount of traction was placed on the limb. The labrum was debrided. The acetabulum was reamed with spherical reamers up to 51  mm. A Biomet G7 52 mm shell was impacted in place. A single 30 mm screw was placed up into the ilium, and a trial liner was placed.    All traction was released from the limb. The hip was extended, adducted, and externally rotated. A bone hook was used to keep the proximal femur from impinging on the acetabulum.The table hook was placed under the  proximal femur, and the femur was lifted anteriorly. Releases were performed on the medial surface of the greater trochanter, to facilitate placement of retractors. Broaching was performed with the Regine Biomet Avenir broaches up to size 6.  At intervals during the broaching process, x-rays were made to ensure proper placement. Ultimately, a size 6 Avenir stem was chosen and cemented in place. Based on trial reductions, the standard offset was chosen.     A standard acetabular liner, size E, with a 36 mm Internal diameter was placed. We ultimately choose the -3.5 femoral head, 36 mm in diameter. This was impacted on the taper, and final reductions were performed. Based on C-arm views, leg lengths appeared to be equal. The hip was stable even when traction was applied with a bone hook.    The wound was copiously irrigated with pulsatile lavage and saline solution. The capsule was re-approximated with the tagging sutures. The soft tissues were infiltrated with Marcaine and Toradol. The subcutaneous tissues were closed with 2-0 Vicryl, after closure of the fascia with 0 Vicryl. The skin was reapproximated with skin clips.    A bulky sterile dressing was applied. The patient left the room in satisfactory condition. Final counts were correct.

## 2021-10-28 NOTE — PROGRESS NOTES
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      Mechanical fall  Right femoral neck fracture  -Evaluated by orthopedic surgery  -Patient underwent right total hip arthroplasty today  -Defer routine postop care to orthopedic surgery.    Systolic murmur  -Echocardiogram completed yesterday, EF was normal at 55-60%, grade 1 diastolic dysfunction, moderate aortic root dilatation at 4.1 cm.  -Other than mild aortic sclerosis no other substrate for cardiac murmur.     HTN  -Hold HCTZ for now, anticipate resuming tomorrow morning.     GERD  Esophageal ulcerations, recent history  Anemia (iron deficiency and chronic blood loss)  Noted 4 esophageal ulcerations and esophageal stenosis on EGD 10/7/2021.    -Continue PTA Protonix 40 mg BID and Carafate.  -Recheck hemoglobin tomorrow morning.     Klippel Trenaunay disease (AVM of left lower extremity)  Chronic left lower extremity edema  Recurrent/chronic left lower extremity stasis ulceration  Initially discharge with plans to see Wound clinic as outpatient; has not set up appointment.    -WOCN consult .    -Continue with compression hose.          Diet: NPO per Anesthesia Guidelines for Procedure/Surgery Except for: Meds  Regular Diet Adult     DVT Prophylaxis: Defer to orthopedics  Lemons Catheter: Not present  Code Status: Full Code     Disposition Plan    Expected discharge: 2-3 days, TBD  Entered: Estevan Robins MD 10/28/2021, 2:20 PM        The patient's care was discussed with the Bedside Nurse and  "Patient.    Estevan Robins MD  Hospitalist Service  [facility]  Text Page 7AM-6PM  Securely message with the [employer] Web Console (learn more here)  Text page via [employer] Paging/Directory    ______________________________________________________________________    Interval History   Patient underwent right hip arthroplasty today. He is adequately controlled at the moment. Denies nausea or vomiting. No chest pain or dyspnea.    Data reviewed today: I reviewed all medications, new labs and imaging results over the last 24 hours. I personally reviewed no images or EKG's today.    Physical Exam   Vital signs:  Temp: 97.4  F (36.3  C) Temp src: Temporal BP: 131/74 Pulse: 68   Resp: 8 SpO2: 97 % O2 Device: None (Room air) Oxygen Delivery: 2 LPM Height: 175.3 cm (5' 9\") Weight: 90.7 kg (200 lb)  Estimated body mass index is 29.53 kg/m  as calculated from the following:    Height as of this encounter: 1.753 m (5' 9\").    Weight as of this encounter: 90.7 kg (200 lb).      Wt Readings from Last 2 Encounters:   10/27/21 90.7 kg (200 lb)   10/14/21 88.9 kg (196 lb)       Gen: AAOX3, NAD, slightly groggy from anesthesia but answering questions appropriately  HEENT: no pallor  Resp: CTA B/L, normal WOB, no crackles, no wheezes  CVS: RRR, no murmur  Abd/GI: Soft, non-tender. BS- normoactive.   Skin: Warm, dry no rashes  MSK: Right hip surgical incision is bandaged, left lower extremity with chronic edema  Neuro- CN- intact. No focal deficits.      Data   Recent Labs   Lab 10/28/21  1233 10/28/21  0750 10/28/21  0610 10/28/21  0239 10/27/21  0956   WBC  --  5.6  --   --  8.7   HGB 9.0* 9.0*  --   --  9.3*   MCV  --  76*  --   --  74*   PLT  --  343  --   --  433   NA  --  136  --   --  133   POTASSIUM  --  3.6  --   --  4.0   CHLORIDE  --  107  --   --  101   CO2  --  24  --   --  24   BUN  --  4*  --   --  11   CR  --  0.39*  --   --  0.43*   ANIONGAP  --  5  --   --  8   MATTEO  --  8.3*  --   --  8.6   GLC "  --  108* 102* 111* 132*       Recent Results (from the past 24 hour(s))   Echocardiogram Complete   Result Value    LVEF  55-60%    [facility]    [id number]  [id number]  [id number]^MIRA^BERNICE^MARGY     [facility]  Echocardiography Laboratory  [address] [place] [address]     Name: PRERNA DALE  MRN: [id number]  : 1960  Study Date: 10/27/2021 02:24 PM  Age: 61 yrs  Gender: Female  Patient Location: [facility]  Reason For Study: Syncope  Ordering Physician: BERNICE MEYERS  Referring Physician: BERNICE MEYERS  Performed By: Juan Diego Angel RDCS     BSA: 2.1 m2  Height: 69 in  Weight: 200 lb  HR: 79  BP: 141/77 mmHg  ______________________________________________________________________________  Procedure  Complete Portable Echo Adult. Optison (NDC #[id number]) given intravenously.  ______________________________________________________________________________  Interpretation Summary     Left ventricular systolic function is normal.  The visual ejection fraction is 55-60%.  Grade I or early diastolic dysfunction.  The right ventricle is normal in structure, function and size.  No significant valve dysfunction.  Dilation of the inferior vena cava is present with normal respiratory  variation in diameter.  Moderate aortic root dilatation (4.1 cm).  The ascending aorta is Moderately dilated (4.2 cm).  The patient exhibited PVCs.     No prior for comparison.  ______________________________________________________________________________  Left Ventricle  The left ventricle is normal in structure, function and size. The visual  ejection fraction is 55-60%. Left ventricular systolic function is normal.  Grade I or early diastolic dysfunction. Normal left ventricular wall motion.     Right Ventricle  The right ventricle is normal in structure, function and size.     Atria  The left atrium is mildly dilated. Right atrial size is normal.     Mitral Valve  The mitral valve  is normal in structure and function.     Tricuspid Valve  The tricuspid valve is normal in structure and function. Right ventricle  systolic pressure estimate normal.     Aortic Valve  There is mild trileaflet aortic sclerosis.     Pulmonic Valve  The pulmonic valve is not well visualized.     Vessels  Moderate aortic root dilatation. The ascending aorta is Moderately dilated.  Dilation of the inferior vena cava is present with normal respiratory  variation in diameter.     Pericardium  The pericardium appears normal.     Rhythm  The patient exhibited PVCs.  ______________________________________________________________________________  MMode/2D Measurements & Calculations  IVSd: 1.4 cm     LVIDd: 4.8 cm  LVIDs: 3.2 cm  LVPWd: 0.92 cm  FS: 33.8 %  LV mass(C)d: 212.6 grams  LV mass(C)dI: 102.9 grams/m2  Ao root diam: 3.9 cm  LA dimension: 4.8 cm  asc Aorta Diam: 4.2 cm  LA/Ao: 1.2  LA Volume (BP): 76.4 ml  LA Volume Index (BP): 36.9 ml/m2  RWT: 0.38     Doppler Measurements & Calculations  MV E max wilner: 61.6 cm/sec  MV A max wilner: 110.1 cm/sec  MV E/A: 0.56  MV dec slope: 137.8 cm/sec2  PA acc time: 0.16 sec  E/E' av.4  Lateral E/e': 7.2  Medial E/e': 9.6     ______________________________________________________________________________  Report approved by: Carol Bass 10/27/2021 04:38 PM         XR Surgery COURTNEY Fluoro L/T 5 Min w Stills    Narrative    XR SURGERY COURTNEY FLUORO LESS THAN 5 MIN W STILLS 10/28/2021 11:35 AM     HISTORY: Right Anterior Total Hip Arthroplasty    NUMBER OF IMAGES ACQUIRED: 3    FLUOROSCOPY TIME: .5      Impression    IMPRESSION: Spot images demonstrate placement and positioning of total  hip arthroplasty.    DOUG RENEE MD         SYSTEM ID:  [id number]   XR Pelvis w Hip Port Right 1 View    Narrative    XR PELVIS AD HIP PORTABLE RIGHT 1 VIEW 10/28/2021 1:59 PM     HISTORY: evaluate prosthesis    COMPARISON: Fluoroscopic images from the same day.       Impression     IMPRESSION: Interval right total hip replacement. There is no  dislocation or periprosthetic fracture. Hardware appears well seated.  Bones are demineralized.    ROB SAM MD         SYSTEM ID:  [id number]     Medications     lactated ringers       sodium chloride 75 mL/hr at 10/27/21 2000       [Auto Hold] acetaminophen  975 mg Oral Q8H     [Auto Hold] ferrous sulfate  325 mg Oral Daily with breakfast     [Auto Hold] hydrochlorothiazide  12.5 mg Oral Daily     [Auto Hold] pantoprazole  40 mg Oral BID     [Auto Hold] polyethylene glycol  17 g Oral Daily     [Auto Hold] sodium chloride (PF)  3 mL Intracatheter Q8H     [Auto Hold] sucralfate  1 g Oral 4x Daily

## 2021-10-28 NOTE — OR NURSING
OK by KRISS Rodríguez to transfer to the floor. Belongings left in pt room.  Raúl called with update.

## 2021-10-28 NOTE — DISCHARGE INSTRUCTIONS
Left lower anterior leg wound: Monday/Wednesday/Friday  And PRN   1. Cleanse wound with NS or wound cleanser   2.   Dry and protect surrounding skin with no sting barrier film wipe   3.   Cover with silicone foam dressing Mepilex  4x4     Pressure Injury prevention (please order supplies if not in room)  1. Turn every 2 hours, side to side avoid supine on pressure redistribution support surface   2.   Float heels off bed with use of pillows under legs, if ineffective, order offloading boots: Heel Lift boots   3.   If incontinent Cleanse with incontinent cleanser (eg Sukumar spray) followed by skin barrier protectant (eg Critic Aid paste )  BID and after each incontinent episode  4.   Prevent sliding and shear by limiting HOB to 30 degrees or less unless contraindicated, use knee gatch first if not contraindicated  5.   If sitting, use pressure redistribution chair cushion (#[id number]); if unable to shift weight every hour, please limit sitting to an hour at a time  6.   Protective foam dressings PRN,Change at least q 4 days, peel back, peek and replace for daily skin inspection.  7.   Optimize nutrition for healing  
-Normocytic normochromic anemia, iron panel, B12, folate, LDH, bilirubin all WNL(although iron level and iron saturation are borderline, low side). Starting FeSo4 once daily.  -f/u ferritin, reti count, and haptoglobin level  -Likely from GI occult bleeding given recent GI surgery and positive FOBT.  -Harshal Lizarraga, consulted GI Dr. Chatterjee and hem/onc Dr. Rivera  -Will transfuse 1 PRBC overnight(with 20mg IV lasix), and f/u CBC in AM.

## 2021-10-28 NOTE — BRIEF OP NOTE
[facility]    Brief Operative Note    Pre-operative diagnosis: Hip fracture (H) [S72.009A]  Post-operative diagnosis right femoral neck fracture    Procedure: Procedure(s):  ARTHROPLASTY, RIGHT TOTAL HIP, DIRECT ANTERIOR APPROACH  Surgeon: Surgeon(s) and Role:     * Jimmy Pena MD - Primary     * Beatriz Sue - Assisting  Anesthesia: General   Estimated Blood Loss: 150 ml    Drains: None  Specimens: * No specimens in log *  Findings:   None.  Complications: None.  Implants:   Implant Name Type Inv. Item Serial No.  Lot No. LRB No. Used Action   BONE CEMENT SIMPLEX FULL DOSE 6191-1-001 - [id number] Cement, Bone BONE CEMENT SIMPLEX FULL DOSE 6191-1-001  [employer] [id number] Right 1 Implanted   BONE CEMENT SIMPLEX FULL DOSE 6191-1-001 - [id number] Cement, Bone BONE CEMENT SIMPLEX FULL DOSE 6191-1-001  [employer] [id number] Right 1 Implanted   IMP SHELL BIOM G7 ACETAB PPS SANCHEZ HOLE 52MM SZ E [id number] - [id number] Total Joint Component/Insert IMP SHELL BIOM G7 ACETAB PPS SANCHEZ HOLE 52MM SZ E [id number]  [employer] [id number] Right 1 Implanted   IMP SCR ZIM 6.5X30MM ACET CUP SELF TAP -641-30 - [id number] Metallic Hardware/[place] IMP SCR ZIM 6.5X30MM ACET CUP SELF TAP -074-30  [employer] [id number] Right 1 Implanted   IMP LINER BIOM G7 ACET SONIA ARCOMXL CRSLNK 36MM SZ E [id number] - [id number] Total Joint Component/Insert IMP LINER BIOM G7 ACET SONIA ARCOMXL CRSLNK 36MM SZ E [id number]  [employer] [id number] Right 1 Implanted   BONE CEMENT RESTRICTOR MOSQUERA FEMORAL 25MM 975423 - [id number] Cement, Bone BONE CEMENT RESTRICTOR MOSQUERA FEMORAL 25MM 814110  [employer]-R [id number] Right 1 Implanted   IMP ELA  AVENIR DUCKWORTH HIP STEM, STANDARD, CEMENTED, 6 TAPER 12/14  [id number]     [id number] Right 1 Implanted   IMP HEAD FEM ZIM BIOLOX DELTA CER 36MM  -3.5 -812-01 - [id number] Total Joint Component/Insert IMP HEAD FEM ZIM BIOLOX  DELTA CER 36MM  -3.5 -792-01  [employer] [id number] Right 1 Implanted

## 2021-10-28 NOTE — PLAN OF CARE
Patient vital signs are at baseline: Yes  Patient able to ambulate as they were prior to admission or with assist devices provided by therapies during their stay:  No, Hip surgery awaiting   Patient MUST void prior to discharge:  Yes, using purewick d/t bedrest  Patient able to tolerate oral intake:  yes  Pain has adequate pain control using Oral analgesics:  yes

## 2021-10-28 NOTE — ANESTHESIA POSTPROCEDURE EVALUATION
Patient: Vandana Gonzalez    Procedure: Procedure(s):  ARTHROPLASTY, RIGHT TOTAL HIP, DIRECT ANTERIOR APPROACH       Diagnosis:Hip fracture (H) [S72.009A]  Diagnosis Additional Information: No value filed.    Anesthesia Type:  General    Note:  Disposition: Inpatient   Postop Pain Control: Uneventful            Sign Out: Well controlled pain   PONV: No   Neuro/Psych: Uneventful            Sign Out: Acceptable/Baseline neuro status   Airway/Respiratory: Uneventful            Sign Out: Acceptable/Baseline resp. status   CV/Hemodynamics: Uneventful            Sign Out: Acceptable CV status; No obvious hypovolemia; No obvious fluid overload   Other NRE: NONE   DID A NON-ROUTINE EVENT OCCUR? No           Last vitals:  Vitals Value Taken Time   /74 10/28/21 1400   Temp 36.3  C (97.4  F) 10/28/21 1230   Pulse 68 10/28/21 1405   Resp 9 10/28/21 1405   SpO2 98 % 10/28/21 1404   Vitals shown include unvalidated device data.    Electronically Signed By: Marcial Rodríguez MD  October 28, 2021  5:10 PM

## 2021-10-28 NOTE — ANESTHESIA PREPROCEDURE EVALUATION
Prior to Admission medications    Medication Sig Start Date End Date Taking? Authorizing Provider   ferrous sulfate (FEROSUL) 325 (65 Fe) MG tablet Take 1 tablet (325 mg) by mouth daily (with breakfast) 10/6/21  Yes Ned Thomson MD   hydrochlorothiazide (HYDRODIURIL) 12.5 MG tablet Take 1 tablet (12.5 mg) by mouth daily 10/6/21  Yes Joan Weber MD   pantoprazole (PROTONIX) 40 MG EC tablet Take 1 tablet (40 mg) by mouth 2 times daily 10/8/21  Yes Chuy Macias PA-C   sucralfate (CARAFATE) 1 GM tablet Take 1 g by mouth 4 times daily   Yes Unknown, Entered By History   sucralfate (CARAFATE) 1 GM/10ML suspension Take 10 mLs (1 g) by mouth 4 times daily (before meals and nightly)  Patient not taking: Reported on 10/27/2021 10/8/21   Chuy Macias PA-C     Current Facility-Administered Medications Ordered in Epic   Medication Dose Route Frequency Last Rate Last Admin     [Auto Hold] acetaminophen (TYLENOL) tablet 975 mg  975 mg Oral Q8H   975 mg at 10/28/21 0606     [Auto Hold] bisacodyl (DULCOLAX) Suppository 10 mg  10 mg Rectal Daily PRN         ceFAZolin (ANCEF) intermittent infusion 2 g in 100 mL dextrose PRE-MIX  2 g Intravenous Pre-Op/Pre-procedure x 1 dose         ceFAZolin (ANCEF) intermittent infusion 2 g in 100 mL dextrose PRE-MIX  2 g Intravenous See Admin Instructions         [Auto Hold] ferrous sulfate (FEROSUL) tablet 325 mg  325 mg Oral Daily with breakfast         [Auto Hold] hydrochlorothiazide half-tab 12.5 mg  12.5 mg Oral Daily   12.5 mg at 10/28/21 0802     [Auto Hold] HYDROmorphone (DILAUDID) injection 0.2 mg  0.2 mg Intravenous Q2H PRN         lactated ringers infusion   Intravenous Continuous         [Auto Hold] lidocaine (LMX4) cream   Topical Q1H PRN         [Auto Hold] lidocaine 1 % 0.1-1 mL  0.1-1 mL Other Q1H PRN         [Auto Hold] melatonin tablet 1 mg  1 mg Oral At Bedtime PRN   1 mg at 10/28/21 0047     [Auto Hold] methocarbamol (ROBAXIN) tablet 500 mg  500  mg Oral 4x Daily PRN   500 mg at 10/28/21 0047     [Auto Hold] naloxone (NARCAN) injection 0.2 mg  0.2 mg Intravenous Q2 Min PRN        Or     [Auto Hold] naloxone (NARCAN) injection 0.4 mg  0.4 mg Intravenous Q2 Min PRN        Or     [Auto Hold] naloxone (NARCAN) injection 0.2 mg  0.2 mg Intramuscular Q2 Min PRN        Or     [Auto Hold] naloxone (NARCAN) injection 0.4 mg  0.4 mg Intramuscular Q2 Min PRN         [Auto Hold] ondansetron (ZOFRAN-ODT) ODT tab 4 mg  4 mg Oral Q6H PRN        Or     [Auto Hold] ondansetron (ZOFRAN) injection 4 mg  4 mg Intravenous Q6H PRN         [Auto Hold] oxyCODONE (ROXICODONE) tablet 5 mg  5 mg Oral Q4H PRN   5 mg at 10/28/21 0606     [Auto Hold] pantoprazole (PROTONIX) EC tablet 40 mg  40 mg Oral BID   40 mg at 10/27/21 2148     [Auto Hold] polyethylene glycol (MIRALAX) Packet 17 g  17 g Oral Daily   17 g at 10/27/21 1544     [Auto Hold] senna-docusate (SENOKOT-S/PERICOLACE) 8.6-50 MG per tablet 1 tablet  1 tablet Oral BID PRN        Or     [Auto Hold] senna-docusate (SENOKOT-S/PERICOLACE) 8.6-50 MG per tablet 2 tablet  2 tablet Oral BID PRN         sodium chloride (PF) 0.9% PF flush 3 mL  3 mL Intracatheter Q8H         sodium chloride (PF) 0.9% PF flush 3 mL  3 mL Intracatheter q1 min prn         [Auto Hold] sodium chloride (PF) 0.9% PF flush 3 mL  3 mL Intracatheter Q8H   3 mL at 10/28/21 0507     [Auto Hold] sodium chloride (PF) 0.9% PF flush 3 mL  3 mL Intracatheter q1 min prn         sodium chloride 0.9% infusion   Intravenous Continuous 75 mL/hr at 10/27/21 2000 Rate Verify at 10/27/21 2000     [Auto Hold] sucralfate (CARAFATE) tablet 1 g  1 g Oral 4x Daily   1 g at 10/27/21 2147     tranexamic acid 1 g in 100 mL 0.7% NaCl IV bag (premix)  1 g Intravenous Once         tranexamic acid 1 g in 100 mL 0.7% NaCl IV bag (premix)  1 g Intravenous Once         No current [facility]-ordered outpatient medications on file.       lactated ringers       sodium chloride 75 mL/hr at 10/27/21 2000      No results for input(s): ABO, RH in the last 10972 hours.  No results for input(s): HCG in the last 91045 hours.  Recent Results (from the past 744 hour(s))   XR Pelvis w Hip Right 1 View    Narrative    XR PELVIS AND HIP RIGHT 1 VIEW 10/27/2021 10:19 AM     HISTORY: right hip      Impression    IMPRESSION: Right femoral neck fracture. Hip joint space width appears  within normal limits.    DOUG RENEE MD         SYSTEM ID:  [id number]   Echocardiogram Complete   Result Value    LVEF  55-60%    Narrative    [id number]  [id number]  [id number]  [id number]^MIRA^BERNICE^MARGY     [facility]  Echocardiography Laboratory  [address] [place], [place] [address]     Name: PRERNA DALE  MRN: [id number]  : 1960  Study Date: 10/27/2021 02:24 PM  Age: 61 yrs  Gender: Female  Patient Location: [facility]  Reason For Study: Syncope  Ordering Physician: BERNICE MEYERS  Referring Physician: BERNICE MEYERS  Performed By: Juan Diego Angel RDCS     BSA: 2.1 m2  Height: 69 in  Weight: 200 lb  HR: 79  BP: 141/77 mmHg  ______________________________________________________________________________  Procedure  Complete Portable Echo Adult. Optison (NDC #[id number]) given intravenously.  ______________________________________________________________________________  Interpretation Summary     Left ventricular systolic function is normal.  The visual ejection fraction is 55-60%.  Grade I or early diastolic dysfunction.  The right ventricle is normal in structure, function and size.  No significant valve dysfunction.  Dilation of the inferior vena cava is present with normal respiratory  variation in diameter.  Moderate aortic root dilatation (4.1 cm).  The ascending aorta is Moderately dilated (4.2 cm).  The patient exhibited PVCs.     No prior for comparison.  ______________________________________________________________________________  Left Ventricle  The left ventricle is normal in structure,  function and size. The visual  ejection fraction is 55-60%. Left ventricular systolic function is normal.  Grade I or early diastolic dysfunction. Normal left ventricular wall motion.     Right Ventricle  The right ventricle is normal in structure, function and size.     Atria  The left atrium is mildly dilated. Right atrial size is normal.     Mitral Valve  The mitral valve is normal in structure and function.     Tricuspid Valve  The tricuspid valve is normal in structure and function. Right ventricle  systolic pressure estimate normal.     Aortic Valve  There is mild trileaflet aortic sclerosis.     Pulmonic Valve  The pulmonic valve is not well visualized.     Vessels  Moderate aortic root dilatation. The ascending aorta is Moderately dilated.  Dilation of the inferior vena cava is present with normal respiratory  variation in diameter.     Pericardium  The pericardium appears normal.     Rhythm  The patient exhibited PVCs.  ______________________________________________________________________________  MMode/2D Measurements & Calculations  IVSd: 1.4 cm     LVIDd: 4.8 cm  LVIDs: 3.2 cm  LVPWd: 0.92 cm  FS: 33.8 %  LV mass(C)d: 212.6 grams  LV mass(C)dI: 102.9 grams/m2  Ao root diam: 3.9 cm  LA dimension: 4.8 cm  asc Aorta Diam: 4.2 cm  LA/Ao: 1.2  LA Volume (BP): 76.4 ml  LA Volume Index (BP): 36.9 ml/m2  RWT: 0.38     Doppler Measurements & Calculations  MV E max wilner: 61.6 cm/sec  MV A max wilner: 110.1 cm/sec  MV E/A: 0.56  MV dec slope: 137.8 cm/sec2  PA acc time: 0.16 sec  E/E' av.4  Lateral E/e': 7.2  Medial E/e': 9.6     ______________________________________________________________________________  Report approved by: Carol Bass 10/27/2021 04:38 PM         Anesthesia Pre-Procedure Evaluation    Patient: Vandana Gonzalez   MRN: [id number] : 1960        Preoperative Diagnosis: Hip fracture (H) [S72.009A]    Procedure : Procedure(s):  ARTHROPLASTY, RIGHT TOTAL HIP, DIRECT ANTERIOR APPROACH           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, unspecified site     large angioma adomen     Hypertensive disorder      Klippel Trenaunay disease     AVM left leg     Phlebitis and thrombophlebitis of other deep vessels of lower extremities     AVM left leg     Stasis ulcer of lower extremity (H) 2012    Left-recurrent      Past Surgical History:   Procedure Laterality Date     BIOPSY OF BREAST, INCISIONAL      phyllodes tumor left     BREAST SURGERY       C LIGATN FEMORAL VEIN      multiple vein strippings left     ESOPHAGOSCOPY, GASTROSCOPY, DUODENOSCOPY (EGD), COMBINED N/A 10/7/2021    Procedure: ESOPHAGOGASTRODUODENOSCOPY (EGD);  Surgeon: Romeo Chavez MD;  Location:  GI     FEMUR SURGERY       left side      ORTHOPEDIC SURGERY      repair broken femur     ZZHC UGI ENDOSCOPY, SIMPLE EXAM      esophageal stricture      Allergies   Allergen Reactions     No Known Allergies      Gadolinium Rash      Social History     Tobacco Use     Smoking status: Former Smoker     Packs/day: 0.25     Years: 2.00     Pack years: 0.50     Types: Cigarettes     Start date: 10/1/1990     Quit date: 10/1/1992     Years since quittin.0     Smokeless tobacco: Never Used     Tobacco comment: Quit    Substance Use Topics     Alcohol use: Yes     Alcohol/week: 0.0 standard drinks     Comment: couple beers per day      Wt Readings from Last 1 Encounters:   10/27/21 90.7 kg (200 lb)        Anesthesia Evaluation   Pt has had prior anesthetic.     No history of anesthetic complications       ROS/MED HX  ENT/Pulmonary:    (-) tobacco use   Neurologic:       Cardiovascular:     (+) hypertension-----    METS/Exercise Tolerance:     Hematologic:       Musculoskeletal: Comment: Left lower extremity wound covered      GI/Hepatic: Comment: Within last month signifcant  bleeding hgb 5  Endoscopy identified    (+) GERD, Symptomatic,     Renal/Genitourinary:       Endo:       Psychiatric/Substance Use:       Infectious Disease:       Malignancy:       Other:            Physical Exam    Airway        Mallampati: I    Neck ROM: full     Respiratory Devices and Support         Dental  no notable dental history         Cardiovascular   cardiovascular exam normal          Pulmonary   pulmonary exam normal                OUTSIDE LABS:  CBC:   Lab Results   Component Value Date    WBC 5.6 10/28/2021    WBC 8.7 10/27/2021    HGB 9.0 (L) 10/28/2021    HGB 9.3 (L) 10/27/2021    HCT 31.4 (L) 10/28/2021    HCT 33.3 (L) 10/27/2021     10/28/2021     10/27/2021     BMP:   Lab Results   Component Value Date     10/28/2021     10/27/2021    POTASSIUM 3.6 10/28/2021    POTASSIUM 4.0 10/27/2021    CHLORIDE 107 10/28/2021    CHLORIDE 101 10/27/2021    CO2 24 10/28/2021    CO2 24 10/27/2021    BUN 4 (L) 10/28/2021    BUN 11 10/27/2021    CR 0.39 (L) 10/28/2021    CR 0.43 (L) 10/27/2021     (H) 10/28/2021     (H) 10/28/2021     COAGS:   Lab Results   Component Value Date    INR 1.12 10/07/2021     POC: No results found for: BGM, HCG, HCGS  HEPATIC:   Lab Results   Component Value Date    ALBUMIN 3.4 10/14/2021    PROTTOTAL 7.8 10/14/2021    ALT 31 10/14/2021    AST 29 10/14/2021    ALKPHOS 132 10/14/2021    BILITOTAL 0.4 10/14/2021     OTHER:   Lab Results   Component Value Date    MATTEO 8.3 (L) 10/28/2021       Anesthesia Plan    ASA Status:  2   NPO Status:  NPO Appropriate    Anesthesia Type: General.     - Airway: ETT   Induction: Intravenous.   Maintenance: Balanced.   Techniques and Equipment:     - Airway: Video-Laryngoscope         Consents    Anesthesia Plan(s) and associated risks, benefits, and realistic alternatives discussed. Questions answered and patient/representative(s) expressed understanding.     - Discussed with:  Patient         Postoperative  Care    Pain management: IV analgesics.   PONV prophylaxis: Ondansetron (or other 5HT-3)     Comments:                Marcial Rodríguez MD

## 2021-10-28 NOTE — PLAN OF CARE
Pt is AOx4. VSS on RA. Pt on bedrest. Pain controlled with PRN oxy and robaxin. NPO since midnight. Denies N/V. CMS intact. Purewick in place. No BM this shift. Wound to LLE noted; pt denied WOC consult, will try again today. +2/3 edema with compression stocking in place to LLE; pt refused skin assessment. NS @ 75 mL/hr. Plan for surgery this morning with Dr. Pena; continue to monitor

## 2021-10-28 NOTE — ANESTHESIA PROCEDURE NOTES
Airway       Patient location during procedure: OR ([facility] - Operating Room or Procedural Area)       Procedure Start/Stop Times: 10/28/2021 9:32 AM  Staff -        Anesthesiologist:  Marcial Rodríguez MD       CRNA: Kacie Martinez APRN CRNA       Performed By: CRNA  Consent for Airway        Urgency: elective  Indications and Patient Condition       Indications for airway management: mercedez-procedural       Induction type:intravenous       Mask difficulty assessment: 2 - vent by mask + OA or adjuvant +/- NMBA    Final Airway Details       Final airway type: endotracheal airway       Successful airway: ETT - single  Endotracheal Airway Details        ETT size (mm): 7.0       Cuffed: yes       Successful intubation technique: video laryngoscopy       VL Blade Size: Glidescope 3       Grade View of Cords: 1       Adjucts: stylet       Position: Right       Measured from: gums/teeth       Secured at (cm): 20       Bite block used: None    Post intubation assessment        Number of attempts at approach: 1       Number of other approaches attempted: 0       Secured with: pink tape       Ease of procedure: easy       Dentition: Intact and Unchanged

## 2021-10-29 ENCOUNTER — APPOINTMENT (OUTPATIENT)
Dept: PHYSICAL THERAPY | Facility: CLINIC | Age: 61
DRG: 522 | End: 2021-10-29
Payer: COMMERCIAL

## 2021-10-29 ENCOUNTER — APPOINTMENT (OUTPATIENT)
Dept: OCCUPATIONAL THERAPY | Facility: CLINIC | Age: 61
DRG: 522 | End: 2021-10-29
Attending: PHYSICIAN ASSISTANT
Payer: COMMERCIAL

## 2021-10-29 ENCOUNTER — APPOINTMENT (OUTPATIENT)
Dept: PHYSICAL THERAPY | Facility: CLINIC | Age: 61
DRG: 522 | End: 2021-10-29
Attending: PHYSICIAN ASSISTANT
Payer: COMMERCIAL

## 2021-10-29 LAB
ERYTHROCYTE [DISTWIDTH] IN BLOOD BY AUTOMATED COUNT: ABNORMAL %
GLUCOSE BLD-MCNC: 111 MG/DL (ref 70–99)
HCT VFR BLD AUTO: 27.8 % (ref 35–47)
HGB BLD-MCNC: 7.8 G/DL (ref 11.7–15.7)
HGB BLD-MCNC: 8.1 G/DL (ref 11.7–15.7)
MCH RBC QN AUTO: 21.4 PG (ref 26.5–33)
MCHC RBC AUTO-ENTMCNC: 28.1 G/DL (ref 31.5–36.5)
MCV RBC AUTO: 76 FL (ref 78–100)
PLATELET # BLD AUTO: 324 10E3/UL (ref 150–450)
RBC # BLD AUTO: 3.64 10E6/UL (ref 3.8–5.2)
WBC # BLD AUTO: 7.5 10E3/UL (ref 4–11)

## 2021-10-29 PROCEDURE — 250N000013 HC RX MED GY IP 250 OP 250 PS 637: Performed by: PHYSICIAN ASSISTANT

## 2021-10-29 PROCEDURE — 85027 COMPLETE CBC AUTOMATED: CPT | Performed by: INTERNAL MEDICINE

## 2021-10-29 PROCEDURE — 36415 COLL VENOUS BLD VENIPUNCTURE: CPT | Performed by: INTERNAL MEDICINE

## 2021-10-29 PROCEDURE — 250N000011 HC RX IP 250 OP 636: Performed by: PHYSICIAN ASSISTANT

## 2021-10-29 PROCEDURE — 85018 HEMOGLOBIN: CPT | Performed by: ORTHOPAEDIC SURGERY

## 2021-10-29 PROCEDURE — 99232 SBSQ HOSP IP/OBS MODERATE 35: CPT | Performed by: INTERNAL MEDICINE

## 2021-10-29 PROCEDURE — 36415 COLL VENOUS BLD VENIPUNCTURE: CPT | Performed by: ORTHOPAEDIC SURGERY

## 2021-10-29 PROCEDURE — 97116 GAIT TRAINING THERAPY: CPT | Mod: GP

## 2021-10-29 PROCEDURE — 120N000001 HC R&B MED SURG/OB

## 2021-10-29 PROCEDURE — 97165 OT EVAL LOW COMPLEX 30 MIN: CPT | Mod: GO

## 2021-10-29 PROCEDURE — 97162 PT EVAL MOD COMPLEX 30 MIN: CPT | Mod: GP

## 2021-10-29 PROCEDURE — 82947 ASSAY GLUCOSE BLOOD QUANT: CPT | Performed by: INTERNAL MEDICINE

## 2021-10-29 PROCEDURE — 97535 SELF CARE MNGMENT TRAINING: CPT | Mod: GO

## 2021-10-29 PROCEDURE — 97530 THERAPEUTIC ACTIVITIES: CPT | Mod: GP

## 2021-10-29 RX ADMIN — ASPIRIN 81 MG: 81 TABLET, COATED ORAL at 09:26

## 2021-10-29 RX ADMIN — FERROUS SULFATE TAB 325 MG (65 MG ELEMENTAL FE) 325 MG: 325 (65 FE) TAB at 09:26

## 2021-10-29 RX ADMIN — HYDROMORPHONE HYDROCHLORIDE 0.4 MG: 0.2 INJECTION, SOLUTION INTRAMUSCULAR; INTRAVENOUS; SUBCUTANEOUS at 06:42

## 2021-10-29 RX ADMIN — SENNOSIDES AND DOCUSATE SODIUM 1 TABLET: 8.6; 5 TABLET ORAL at 20:19

## 2021-10-29 RX ADMIN — ASPIRIN 81 MG: 81 TABLET, COATED ORAL at 20:19

## 2021-10-29 RX ADMIN — POLYETHYLENE GLYCOL 3350 17 G: 17 POWDER, FOR SOLUTION ORAL at 09:25

## 2021-10-29 RX ADMIN — OXYCODONE HYDROCHLORIDE 10 MG: 5 TABLET ORAL at 14:56

## 2021-10-29 RX ADMIN — METHOCARBAMOL 750 MG: 750 TABLET ORAL at 12:42

## 2021-10-29 RX ADMIN — PANTOPRAZOLE SODIUM 40 MG: 40 TABLET, DELAYED RELEASE ORAL at 20:19

## 2021-10-29 RX ADMIN — SUCRALFATE 1 G: 1 TABLET ORAL at 17:27

## 2021-10-29 RX ADMIN — ACETAMINOPHEN 975 MG: 325 TABLET, FILM COATED ORAL at 22:49

## 2021-10-29 RX ADMIN — CEFAZOLIN SODIUM 2 G: 2 INJECTION, SOLUTION INTRAVENOUS at 00:44

## 2021-10-29 RX ADMIN — SUCRALFATE 1 G: 1 TABLET ORAL at 22:49

## 2021-10-29 RX ADMIN — PANTOPRAZOLE SODIUM 40 MG: 40 TABLET, DELAYED RELEASE ORAL at 09:26

## 2021-10-29 RX ADMIN — OXYCODONE HYDROCHLORIDE 5 MG: 5 TABLET ORAL at 09:32

## 2021-10-29 RX ADMIN — ACETAMINOPHEN 975 MG: 325 TABLET, FILM COATED ORAL at 14:56

## 2021-10-29 RX ADMIN — ACETAMINOPHEN 975 MG: 325 TABLET, FILM COATED ORAL at 06:42

## 2021-10-29 RX ADMIN — SUCRALFATE 1 G: 1 TABLET ORAL at 12:42

## 2021-10-29 RX ADMIN — SENNOSIDES AND DOCUSATE SODIUM 1 TABLET: 8.6; 5 TABLET ORAL at 09:26

## 2021-10-29 RX ADMIN — SUCRALFATE 1 G: 1 TABLET ORAL at 09:32

## 2021-10-29 RX ADMIN — OXYCODONE HYDROCHLORIDE 5 MG: 5 TABLET ORAL at 20:18

## 2021-10-29 RX ADMIN — HYDROMORPHONE HYDROCHLORIDE 0.2 MG: 0.2 INJECTION, SOLUTION INTRAMUSCULAR; INTRAVENOUS; SUBCUTANEOUS at 04:04

## 2021-10-29 ASSESSMENT — ACTIVITIES OF DAILY LIVING (ADL)
ADLS_ACUITY_SCORE: 15
ADLS_ACUITY_SCORE: 15
ADLS_ACUITY_SCORE: 17
ADLS_ACUITY_SCORE: 15
ADLS_ACUITY_SCORE: 15
ADLS_ACUITY_SCORE: 17
ADLS_ACUITY_SCORE: 17
ADLS_ACUITY_SCORE: 15
ADLS_ACUITY_SCORE: 17
ADLS_ACUITY_SCORE: 17
ADLS_ACUITY_SCORE: 15
ADLS_ACUITY_SCORE: 14
ADLS_ACUITY_SCORE: 15
ADLS_ACUITY_SCORE: 17
ADLS_ACUITY_SCORE: 17
ADLS_ACUITY_SCORE: 15
ADLS_ACUITY_SCORE: 13
ADLS_ACUITY_SCORE: 15
ADLS_ACUITY_SCORE: 15

## 2021-10-29 ASSESSMENT — MIFFLIN-ST. JEOR: SCORE: 1541.38

## 2021-10-29 NOTE — PLAN OF CARE
POD 1, right total hip arthroplasty,  A/O x4 VSS on room air, CMS and dressing to right hip intact,  IV dilaudid and tylenol for incisional, pain, pt ambulated x1  . Ax1 gait belt and walker,pure wick in place draining well,regular diet, encourage OOB

## 2021-10-29 NOTE — PROGRESS NOTES
[facility]    Medicine Progress Note - Hospitalist Service        Date of Admission:  10/27/2021  9:45 AM    Assessment & Plan:   61-year-old female with history of anemia (iron deficiency and chronic blood loss), HTN, GERD, Esophageal ulcers, Klippel Trenaunay disease (AVM of left lower extremity), Chronic left lower extremity edema, Recurrent/chronic left lower extremity stasis ulceration who was admitted to [facility] after a mechanical fall that resulted in a right femoral neck fracture.      Mechanical fall  Right femoral neck fracture  -Evaluated by orthopedic surgery  -Patient underwent right total hip arthroplasty on 10/28  -Defer routine postop care to orthopedic surgery.  -PT evaluation later today, she might need    Systolic murmur  -Echocardiogram completed yesterday, EF was normal at 55-60%, grade 1 diastolic dysfunction, moderate aortic root dilatation at 4.1 cm.  -Other than mild aortic sclerosis no other substrate for cardiac murmur.     HTN  -Hold HCTZ for now, blood pressure on the low normal side, anticipate resuming at discharge.     GERD  Esophageal ulcerations, recent history  Anemia (iron deficiency and chronic blood loss)  Noted 4 esophageal ulcerations and esophageal stenosis on EGD 10/7/2021.    -Continue PTA Protonix 40 mg BID and Carafate.  -Hemoglobin lower at 7.8 this morning, will recheck later this afternoon.     Klippel Trenaunay disease (AVM of left lower extremity)  Chronic left lower extremity edema  Recurrent/chronic left lower extremity stasis ulceration  Initially discharge with plans to see Wound clinic as outpatient; has not set up appointment.    -WOCN consult .    -Continue with compression hose.          Diet: Advance Diet as Tolerated: Regular Diet Adult  Regular Diet Adult     DVT Prophylaxis: Defer to orthopedics  Lemons Catheter: Not present  Code Status: Full Code     Disposition Plan    Expected discharge: 10/30, TBD.    Entered: Estevan  "MD Shimon 10/29/2021, 2:04 PM        The patient's care was discussed with the Bedside Nurse and Patient.    Estevan Robins MD  Hospitalist Service  [facility]  Text Page 7AM-6PM  Securely message with the Vocera Web Console (learn more here)  Text page via [employer] Paging/Directory    ______________________________________________________________________    Interval History   Patient underwent surgery yesterday, pain appears to be very well controlled.  She denies nausea vomiting or dyspnea or chest pain.  Hemoglobin slightly lower at 7.8.    Data reviewed today: I reviewed all medications, new labs and imaging results over the last 24 hours. I personally reviewed no images or EKG's today.    Physical Exam   Vital signs:  Temp: 98.6  F (37  C) Temp src: Oral BP: 114/66 Pulse: 97   Resp: 18 SpO2: 98 % O2 Device: None (Room air) Oxygen Delivery: 2 LPM Height: 175.3 cm (5' 9\") Weight: 91.2 kg (201 lb 1 oz)  Estimated body mass index is 29.69 kg/m  as calculated from the following:    Height as of this encounter: 1.753 m (5' 9\").    Weight as of this encounter: 91.2 kg (201 lb 1 oz).      Wt Readings from Last 2 Encounters:   10/29/21 91.2 kg (201 lb 1 oz)   10/14/21 88.9 kg (196 lb)       Gen: AAOX3, NAD  HEENT: no pallor  Resp: CTA B/L, normal WOB  CVS: RRR, no murmur  Abd/GI: Soft, non-tender. BS- normoactive.   Skin: Warm, dry no rashes  MSK: Right hip surgical incision is bandaged  Neuro- CN- intact. No focal deficits.      Data   Recent Labs   Lab 10/29/21  0758 10/28/21  1233 10/28/21  0750 10/28/21  0610 10/28/21  0239 10/27/21  0956 10/27/21  0956   WBC 7.5  --  5.6  --   --   --  8.7   HGB 7.8* 9.0* 9.0*  --   --    < > 9.3*   MCV 76*  --  76*  --   --   --  74*     --  343  --   --   --  433   NA  --   --  136  --   --   --  133   POTASSIUM  --   --  3.6  --   --   --  4.0   CHLORIDE  --   --  107  --   --   --  101   CO2  --   --  24  --   --   --  24   BUN  --   --  4*  " --   --   --  11   CR  --   --  0.39*  --   --   --  0.43*   ANIONGAP  --   --  5  --   --   --  8   MATTEO  --   --  8.3*  --   --   --  8.6   *  --  108* 102*   < >  --  132*    < > = values in this interval not displayed.       No results found for this or any previous visit (from the past 24 hour(s)).  Medications       acetaminophen  975 mg Oral Q8H     aspirin  81 mg Oral BID     ferrous sulfate  325 mg Oral Daily with breakfast     [Held by provider] hydrochlorothiazide  12.5 mg Oral Daily     pantoprazole  40 mg Oral BID     polyethylene glycol  17 g Oral Daily     senna-docusate  1 tablet Oral BID     sodium chloride (PF)  3 mL Intracatheter Q8H     sucralfate  1 g Oral 4x Daily

## 2021-10-29 NOTE — PROGRESS NOTES
10/29/21 0927   Quick Adds   Type of Visit Initial PT Evaluation   Living Environment   People in home spouse   Current Living Arrangements house   Home Accessibility stairs within home   Number of Stairs, Within Home, Primary 8   Stair Railings, Within Home, Primary railings safe and in good condition   Transportation Anticipated family or friend will provide   Self-Care   Usual Activity Tolerance good   Current Activity Tolerance fair   Equipment Currently Used at Home cane, straight   Activity/Exercise/Self-Care Comment Patient reports she intermittently uses a cane when ambulating for support due to chronic L leg swelling.    Disability/Function   Hearing Difficulty or Deaf no   Wear Glasses or Blind yes   Vision Management glasses   Concentrating, Remembering or Making Decisions Difficulty no   Difficulty Communicating no   Difficulty Eating/Swallowing no   Fall history within last six months yes   Number of times patient has fallen within last six months 2   Change in Functional Status Since Onset of Current Illness/Injury yes   General Information   Onset of Illness/Injury or Date of Surgery 10/28/21  (DOS)   Referring Physician Beatriz Sue    Patient/Family Therapy Goals Statement (PT) to be able to walk   Pertinent History of Current Problem (include personal factors and/or comorbidities that impact the POC) Patient is 62 YO F who presented to the hospital after a fall with R femoral neck fracture. Patient is now POD#1 s/p R direct anterior TRUNG. 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   Existing Precautions/Restrictions fall  (No hip precautions)   Weight-Bearing Status - LUE full weight-bearing   Weight-Bearing Status - RUE full weight-bearing   Weight-Bearing Status - LLE full weight-bearing   Weight-Bearing Status - RLE weight-bearing as tolerated   General Observations Activity orders: ambualte with assist   Cognition   Orientation  Status (Cognition) oriented x 4   Affect/Mental Status (Cognition) flat/blunted affect   Follows Commands (Cognition) WNL   Pain Assessment   Patient Currently in Pain Yes, see Vital Sign flowsheet   Integumentary/Edema   Integumentary/Edema Comments Chronic severe L LE edema, patient reports unchanged from baseline   Posture    Posture Forward head position;Protracted shoulders   Range of Motion (ROM)   ROM Quick Adds ROM deficits secondary to surgical procedure  (R hip)   ROM Comment Decreased AROM on L LE due to chronic edema/weakness   Strength   Manual Muscle Testing Quick Adds Deficits observed during functional mobility   Strength Comments Unable to perform full SLR on R LE; Functional weakness during mobility, needed to use hands to lift each LE out of bed   Bed Mobility   Bed Mobility supine-sit   Supine-Sit [place] (Bed Mobility) supervision   Comment (Bed Mobility) Patient uses hands to progress each LE out of bed, increased time to complete   Transfers   Transfers sit-stand transfer   Sit-Stand Transfer   Sit-Stand [place] (Transfers) contact guard;verbal cues   Assistive Device (Sit-Stand Transfers) walker, front-wheeled   Sit/Stand Transfer Comments From edge of bed, pain with transitional movement   Gait/Stairs (Locomotion)   [place] Level (Gait) contact guard   Assistive Device (Gait) walker, front-wheeled   Distance in Feet (Required for LE Total Joints) 12' during eval + 125' during treatment   Pattern (Gait) step-to   Comment (Gait/Stairs) Patient ambulated in room with step-to pattern, leaning on UE for support, slow stewart   Balance   Balance Comments Good sitting balance on edge of bed, Min assist for unsupported standing, SBA for standing at FWW   Sensory Examination   Sensory Perception patient reports no sensory changes   Coordination   Coordination no deficits were identified   Clinical Impression   Criteria for Skilled Therapeutic Intervention yes, treatment indicated   PT  Diagnosis (PT) impaired mobility   Influenced by the following impairments pain, weakness, decreased activity tolerance, edema   Functional limitations due to impairments increased difficulty with bed mobility, transfers and gait   Clinical Presentation Evolving/Changing   Clinical Presentation Rationale PMH/co-morbidities, lightheadedness, clinilcal judgement   Clinical Decision Making (Complexity) moderate complexity   Therapy Frequency (PT) 2x/day   Predicted Duration of Therapy Intervention (days/wks) 1 week   Planned Therapy Interventions (PT) bed mobility training;cryotherapy;gait training;home exercise program;patient/family education;stair training;strengthening;transfer training;progressive activity/exercise   Anticipated Equipment Needs at Discharge (PT) walker, rolling   Risk & Benefits of therapy have been explained evaluation/treatment results reviewed;care plan/treatment goals reviewed;risks/benefits reviewed;current/potential barriers reviewed;participants voiced agreement with care plan;participants included;patient   Clinical Impression Comments Patient with stable vital signs until after performing second trial of stairs, slight lightheadedness reported, BP decreased from 125/62 to 105/61 but symptoms resolved with seated rest.    PT Discharge Planning    PT Discharge Recommendation (DC Rec) home with assist;home with home care physical therapy;Transitional Care Facility   PT Rationale for DC Rec Patient is modified independent with mobility at baseline using at cane. Currently she is requiring CGA-Min assist for mobility. She will have her  at home to assist. Anticipate with skilled PT, patient will progress to be able to navigate stairs to her living area at home and spouse to assist. HHPT recommended to address strength and functional endurance deficits, leaving the house would be taxing due to stairs and use of AD. If spouse unable to assist, patient would need TCU prior to returning  home.    Total Evaluation Time   Total Evaluation Time (Minutes) 10

## 2021-10-29 NOTE — PLAN OF CARE
CMS intact. Dressing to R hip changed per orders, CDI- bruising /swelling present. Continent of B&B, voiding w/o difficulty. Ambulating w/ 1 assist, GB/ walker. Pain controlled w/ PRN Oxy + sched Tylenol. Dressing changed/ intact to LLE. HBG 7.8 this am, recheck scheduled this jani per orders.

## 2021-10-29 NOTE — PROGRESS NOTES
Orthopedic Surgery  Vandana Gonzalez  10/29/2021  Admit Date:  10/27/2021  POD 1 Day Post-Op  S/P Procedure(s):  ARTHROPLASTY, RIGHT TOTAL HIP, DIRECT ANTERIOR APPROACH    Patient feels good, tired.  Pain controlled.  Tolerating oral intake.  No events overnight.     Alert and orient to person, place, and time.  Vital Sign Ranges  Temperature Temp  Av  F (36.7  C)  Min: 97.2  F (36.2  C)  Max: 98.9  F (37.2  C)   Blood pressure Systolic (24hrs), Av , Min:102 , Max:149        Diastolic (24hrs), Av, Min:47, Max:81      Pulse Pulse  Av  Min: 54  Max: 93   Respirations Resp  Avg: 15.2  Min: 8  Max: 25   Pulse oximetry SpO2  Av.1 %  Min: 95 %  Max: 100 %       Right hip bulky dressing is clean, dry, and intact. Minimal erythema of the surrounding skin.  Bilateral calves are soft, non-tender.  right lower extremity is NVI.  Left lower extremity with mepilex to chronic calf wound, CDI, diffusely erythematous and edematous, Left leg chronic vascular skin changes.  Lymphedema on left.  Darkened toes.      Labs:  Recent Labs   Lab Test 10/28/21  0750 10/27/21  0956 10/14/21  1011   POTASSIUM 3.6 4.0 3.8     Recent Labs   Lab Test 10/29/21  0758 10/28/21  1233 10/28/21  0750   HGB 7.8* 9.0* 9.0*     Recent Labs   Lab Test 10/07/21  0554   INR 1.12     Recent Labs   Lab Test 10/29/21  0758 10/28/21  0750 10/27/21  0956    [phone]       A/P  1. S/p right TRUNG (AP)   Continue aspirin 81 bid for DVT prophylaxis.     Mobilize with PT/OT WBAT.     Continue current pain regimen.   Following hemoglobin   WOC for lle    2. Disposition   Anticipate d/c to home once progressing with therapies.    Kjerstin L Foss, PA-C

## 2021-10-29 NOTE — PROGRESS NOTES
10/29/21 1500   Quick Adds   Type of Visit Initial Occupational Therapy Evaluation   Living Environment   People in home spouse   Current Living Arrangements house   Home Accessibility stairs within home   Number of Stairs, Within Home, Primary 8   Stair Railings, Within Home, Primary railings safe and in good condition   Transportation Anticipated family or friend will provide   Living Environment Comments tub shower with chair, many stairs,  at home    Self-Care   Usual Activity Tolerance good   Current Activity Tolerance moderate   Equipment Currently Used at Home cane, straight;shower chair  (reacher )   Activity/Exercise/Self-Care Comment reports use of cane at baseline. reports use of shower chair for transfer into tub/shower (reports she uses it like a bench; sits down first then swings legs over)   Instrumental Activities of Daily Living (IADL)   IADL Comments she and  split responsibilities. did drive    Disability/Function   Hearing Difficulty or Deaf no   Wear Glasses or Blind yes   Vision Management glasses   Concentrating, Remembering or Making Decisions Difficulty no   Difficulty Communicating no   Difficulty Eating/Swallowing no   Fall history within last six months yes   Number of times patient has fallen within last six months 2   General Information   Onset of Illness/Injury or Date of Surgery 10/27/21   Referring Physician Beatriz Sue   Patient/Family Therapy Goal Statement (OT) to get better    Additional Occupational Profile Info/Pertinent History of Current Problem per chart :Patient is 60 YO F who presented to the hospital after a fall with R femoral neck fracture. Patient is now POD#1 s/p R direct anterior TRUNG. 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 agreeable to session. VSS   Cognitive  Status Examination   Orientation Status orientation to person, place and time   Visual Perception   Visual Impairment/Limitations WFL   Pain Assessment   Patient Currently in Pain Yes, see Vital Sign flowsheet   Range of Motion Comprehensive   General Range of Motion bilateral upper extremity ROM WFL   Comment, General Range of Motion LE deficits consistent with surgery    Strength Comprehensive (MMT)   General Manual Muscle Testing (MMT) Assessment no strength deficits identified   Comment, General Manual Muscle Testing (MMT) Assessment LE deficits consistent with surgery    Coordination   Upper Extremity Coordination No deficits were identified   Transfers   Transfers sit-stand transfer   Sit-Stand Transfer   Sit-Stand [place] (Transfers) contact guard   Sit/Stand Transfer Comments from recliner chair with FWW gait belt   Activities of Daily Living   BADL Assessment lower body dressing   Lower Body Dressing Assessment   [place] Level (Lower Body Dressing) minimum assist (75% patient effort)   Comment (Lower Body Dressing) no AE   Clinical Impression   Criteria for Skilled Therapeutic Interventions Met (OT) yes;meets criteria;skilled treatment is necessary   OT Diagnosis decreased function in ADL   OT Problem List-Impairments impacting ADL activity tolerance impaired;problems related to;mobility;range of motion (ROM);strength;pain;flexibility   Assessment of Occupational Performance 3-5 Performance Deficits   Identified Performance Deficits bathing, dressing, transfer, bed mobility, home mgmt    Planned Therapy Interventions (OT) ADL retraining;IADL retraining;home program guidelines;progressive activity/exercise;transfer training   Clinical Decision Making Complexity (OT) low complexity   Therapy Frequency (OT) Daily   Predicted Duration of Therapy 2 days    Anticipated Equipment Needs Upon Discharge (OT) walker, rolling  (potentially extended tub bench )   Risk & Benefits of therapy have been explained  evaluation/treatment results reviewed;care plan/treatment goals reviewed;risks/benefits reviewed;current/potential barriers reviewed;participants voiced agreement with care plan;participants included;patient   Comment-Clinical Impression decreased function in ADL requires skilled IP OT to increase independence for safe return to home    OT Discharge Planning    OT Discharge Recommendation (DC Rec) Home with assist;home with home care occupational therapy   OT Rationale for DC Rec anticipate that pt will require min A for LB ADL and CGA for transfer into tub shower combo at [facility]. pt reports  able to assist at home as needed for ADL and IADL. pt requires skilled IP OT to increase function and safety. will need HH OT to maximize safety in  the home as it would take taxing effort in order for her to leave the home and assist of 1 with AD   OT Brief overview of current status  min A-CGA for ADL    Total Evaluation Time (Minutes)   Total Evaluation Time (Minutes) 10

## 2021-10-29 NOTE — PROGRESS NOTES
Care Transitions Team: Following for CC, discharge planning, and disposition.       Per [facility] Trauma Liaison Handoff:   Writer spoke with Vandana via phone introduced myself and explained my role in her plan of care. Discussed therapy recommendations to discharge home with home therapy. Vandana is agreeable and would like a referral for home care PT/OT be sent to [facility] .      Living situation:   Support: Spouse    Home environment:   Stairs-had rails?  8 steps   Increase service or equipment needs: Walker     Prior Function level:   Ambulation  Cane    ADL's Independent     Current home care services: None    Family/patient discharge goal: Return to baseline level of fuction    Barriers to Discharge: Medically stable    Discharge Plan of care: Home with [facility] - PT/OT and support of spouse.     Orders needed for services: Post Op Plan of Care - Home with Home Care [facility] - PT/OT    Weight bearing status and Hip precautions: WBAT     Transportation: Spouse will provide        Will follow in collaboration with O CARLIE Trujillo -[phone] [facility] for discharge planning.

## 2021-10-29 NOTE — PLAN OF CARE
A&Ox4, VSS on RA. Just came from PACU. CMS intact, L hip dressing CDI. +BS, refused Gel-0. Will eat supper later. On IVF continues for now. Has been drinking water, denies nausea. Has purewick with good output. Refused to get OOB, not ready yet per pt. Pain is managed by scheduled Tylenol. WOC following her, had chronic wound on her L shin covered by Mepilex. Able to reposition self in bed, encouraged to do it frequently.

## 2021-10-30 ENCOUNTER — APPOINTMENT (OUTPATIENT)
Dept: PHYSICAL THERAPY | Facility: CLINIC | Age: 61
DRG: 522 | End: 2021-10-30
Payer: COMMERCIAL

## 2021-10-30 ENCOUNTER — APPOINTMENT (OUTPATIENT)
Dept: OCCUPATIONAL THERAPY | Facility: CLINIC | Age: 61
DRG: 522 | End: 2021-10-30
Payer: COMMERCIAL

## 2021-10-30 VITALS
RESPIRATION RATE: 16 BRPM | HEART RATE: 91 BPM | WEIGHT: 201.06 LBS | HEIGHT: 69 IN | SYSTOLIC BLOOD PRESSURE: 107 MMHG | TEMPERATURE: 98.5 F | OXYGEN SATURATION: 94 % | DIASTOLIC BLOOD PRESSURE: 51 MMHG | BODY MASS INDEX: 29.78 KG/M2

## 2021-10-30 LAB
ATRIAL RATE - MUSE: 77 BPM
DIASTOLIC BLOOD PRESSURE - MUSE: NORMAL MMHG
GLUCOSE BLDC GLUCOMTR-MCNC: 101 MG/DL (ref 70–99)
HGB BLD-MCNC: 8 G/DL (ref 11.7–15.7)
INTERPRETATION ECG - MUSE: NORMAL
P AXIS - MUSE: 33 DEGREES
POTASSIUM BLD-SCNC: 3.5 MMOL/L (ref 3.4–5.3)
PR INTERVAL - MUSE: 192 MS
QRS DURATION - MUSE: 90 MS
QT - MUSE: 430 MS
QTC - MUSE: 486 MS
R AXIS - MUSE: -6 DEGREES
SYSTOLIC BLOOD PRESSURE - MUSE: NORMAL MMHG
T AXIS - MUSE: 38 DEGREES
VENTRICULAR RATE- MUSE: 77 BPM

## 2021-10-30 PROCEDURE — 97530 THERAPEUTIC ACTIVITIES: CPT | Mod: GP

## 2021-10-30 PROCEDURE — 250N000013 HC RX MED GY IP 250 OP 250 PS 637: Performed by: PHYSICIAN ASSISTANT

## 2021-10-30 PROCEDURE — 85018 HEMOGLOBIN: CPT | Performed by: PHYSICIAN ASSISTANT

## 2021-10-30 PROCEDURE — 99239 HOSP IP/OBS DSCHRG MGMT >30: CPT | Performed by: INTERNAL MEDICINE

## 2021-10-30 PROCEDURE — 84132 ASSAY OF SERUM POTASSIUM: CPT | Performed by: INTERNAL MEDICINE

## 2021-10-30 PROCEDURE — 97116 GAIT TRAINING THERAPY: CPT | Mod: GP

## 2021-10-30 PROCEDURE — 97535 SELF CARE MNGMENT TRAINING: CPT | Mod: GO | Performed by: OCCUPATIONAL THERAPIST

## 2021-10-30 PROCEDURE — 36415 COLL VENOUS BLD VENIPUNCTURE: CPT | Performed by: INTERNAL MEDICINE

## 2021-10-30 RX ORDER — OXYCODONE HYDROCHLORIDE 5 MG/1
5 TABLET ORAL EVERY 4 HOURS PRN
Qty: 30 TABLET | Refills: 0 | Status: SHIPPED | OUTPATIENT
Start: 2021-10-30 | End: 2022-02-28

## 2021-10-30 RX ADMIN — POLYETHYLENE GLYCOL 3350 17 G: 17 POWDER, FOR SOLUTION ORAL at 09:21

## 2021-10-30 RX ADMIN — OXYCODONE HYDROCHLORIDE 10 MG: 5 TABLET ORAL at 06:16

## 2021-10-30 RX ADMIN — SUCRALFATE 1 G: 1 TABLET ORAL at 09:21

## 2021-10-30 RX ADMIN — ACETAMINOPHEN 975 MG: 325 TABLET, FILM COATED ORAL at 14:35

## 2021-10-30 RX ADMIN — ACETAMINOPHEN 975 MG: 325 TABLET, FILM COATED ORAL at 06:16

## 2021-10-30 RX ADMIN — OXYCODONE HYDROCHLORIDE 5 MG: 5 TABLET ORAL at 10:56

## 2021-10-30 RX ADMIN — FERROUS SULFATE TAB 325 MG (65 MG ELEMENTAL FE) 325 MG: 325 (65 FE) TAB at 09:21

## 2021-10-30 RX ADMIN — SUCRALFATE 1 G: 1 TABLET ORAL at 14:36

## 2021-10-30 RX ADMIN — ASPIRIN 81 MG: 81 TABLET, COATED ORAL at 09:21

## 2021-10-30 RX ADMIN — SENNOSIDES AND DOCUSATE SODIUM 2 TABLET: 8.6; 5 TABLET ORAL at 09:27

## 2021-10-30 RX ADMIN — OXYCODONE HYDROCHLORIDE 5 MG: 5 TABLET ORAL at 11:45

## 2021-10-30 RX ADMIN — PANTOPRAZOLE SODIUM 40 MG: 40 TABLET, DELAYED RELEASE ORAL at 09:21

## 2021-10-30 ASSESSMENT — ACTIVITIES OF DAILY LIVING (ADL)
ADLS_ACUITY_SCORE: 14
ADLS_ACUITY_SCORE: 13
ADLS_ACUITY_SCORE: 13
ADLS_ACUITY_SCORE: 14
ADLS_ACUITY_SCORE: 13
ADLS_ACUITY_SCORE: 14
ADLS_ACUITY_SCORE: 14
ADLS_ACUITY_SCORE: 13
ADLS_ACUITY_SCORE: 14
ADLS_ACUITY_SCORE: 14
ADLS_ACUITY_SCORE: 13
ADLS_ACUITY_SCORE: 14
ADLS_ACUITY_SCORE: 14
ADLS_ACUITY_SCORE: 13
ADLS_ACUITY_SCORE: 13

## 2021-10-30 NOTE — PLAN OF CARE
Occupational Therapy Discharge Summary    Reason for therapy discharge:    All goals and outcomes met, no further needs identified.    Progress towards therapy goal(s). See goals on Care Plan in [facility] electronic health record for goal details.  Goals met    Therapy recommendation(s):    Continued therapy is recommended.  Rationale/Recommendations:  all acute OT goals met, would benefit from home OT to address safety with BR transfer, ADLs and activity tolerance. Plans to discharge home today.

## 2021-10-30 NOTE — PLAN OF CARE
Patient is alert and oriented, VSS on RA.  Assist of 1 with GB/W.  Regular diet.  CMS intact.  Dressing CDI.  Slept through the night. C/o pain at a 9, tx with scheduled tylenol and oxycodone.  K draw at 0600.

## 2021-10-30 NOTE — PLAN OF CARE
Physical Therapy Discharge Summary    Reason for therapy discharge:    Discharged to home with home therapy.    Progress towards therapy goal(s). See goals on Care Plan in [facility] electronic health record for goal details.  Goals partially met.  Barriers to achieving goals:   discharge from facility.    Therapy recommendation(s):    Continued therapy is recommended.  Rationale/Recommendations:  HHPT to progress strength, endurance and independence with mobility.  to provide +1 assist for stairs.

## 2021-10-30 NOTE — PLAN OF CARE
Patient is A&O, VSS on RA, CMS intact, regular diet, up with assist of 1, and voiding adequately in the bathroom. Oxycodone and Tylenol given for pain control, and dressing CDI. Will continue to monitor

## 2021-10-30 NOTE — PROGRESS NOTES
Care Management Follow Up    Length of Stay (days): 3    Expected Discharge Date: 10/30/2021     Concerns to be Addressed:       Patient plan of care discussed at interdisciplinary rounds: Yes    Anticipated Discharge Disposition:  home     Anticipated Discharge Services:  homecare  Anticipated Discharge DME:      Patient/family educated on Medicare website which has current facility and service quality ratings:    Education Provided on the Discharge Plan:   yes  Patient/Family in Agreement with the Plan:  yes    Referrals Placed by CM/SW:    Private pay costs discussed: Not applicable    Additional Information:  Homecare PT/OT recommended and referral made to [facility].  Chart notes and demographics faxed to Rhonda at [facility]: [phone].    2:06 PM  Rhonda with [facility] has declined patient.  Referral made to [facility] and spoke with Gian who accepted patient.  Orders faxed to [facility] at:  [phone].    Parisa Reid RN  Care Coordinator  [facility]  [phone] (text or call)

## 2021-10-30 NOTE — PROGRESS NOTES
Discharge instruction & medication review provided to patient w/ teach back. Pt verbalized understanding and all questions were answered. Dressing to R hip intact, CMS intact. Pain controlled w/ PRN Oxy/ sched Tylenol. Up w/ 1 assist. Walker sent with patient per therapy. Will discharge home w/  / will receive home care PT/OT.

## 2021-10-30 NOTE — PROGRESS NOTES
Ortho trauma  S/p Right anterior TRUNG (femoral neck fracture); POD#2    Pain controlled  Denies CP, SOB, nausea, fever/chills  Slept well  NAD, A&O  Dry dressing  Calves soft. NT  CMS intact  Hgb - 8.0  K- 3.5    Plan:   Aspirin 81 mg BID   PT/OT - WBAT   Hgb stable   WOC for left LE  Dispo: anticipate home today after therapy   Ortho discharge orders complete   Follow up with Dr Pena two weeks    Cassandra Gr PA-C  8:14 AM

## 2021-10-30 NOTE — DISCHARGE SUMMARY
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    Hospital Course:    61-year-old female with history of anemia (iron deficiency and chronic blood loss), HTN, GERD, Esophageal ulcers, Klippel Trenaunay disease (AVM of left lower extremity), Chronic left lower extremity edema, Recurrent/chronic left lower extremity stasis ulceration who was admitted to [facility] after a mechanical fall that resulted in a right femoral neck fracture.       Mechanical fall  Right femoral neck fracture  -Patient presented with a mechanical fall, was found to have right femoral neck fracture.  -Evaluated by orthopedic surgery  -Patient underwent right total hip arthroplasty on 10/28  -Recovering well.  She will be discharging home with home PT and OT.    Systolic murmur  -Echocardiogram completed yesterday, EF was normal at 55-60%, grade 1 diastolic dysfunction, moderate aortic root dilatation at 4.1 cm.  -Other than mild aortic sclerosis no other substrate for cardiac murmur.     HTN  -Resume HCTZ at discharge     GERD  Esophageal ulcerations, recent history  Anemia (iron deficiency and chronic blood loss)  Noted 4 esophageal ulcerations and esophageal stenosis on EGD 10/7/2021.    -Continue PTA Protonix 40 mg BID and Carafate.  -Hemoglobin stable at 8.0     Klippel Trenaunay disease (AVM of left lower extremity)  Chronic left lower extremity edema  Recurrent/chronic left lower extremity stasis ulceration  Initially discharge with plans to see Wound clinic as outpatient; has not  "set up appointment.    -WOCN consult .    -Continue with compression hose    Estevan Robins MD    Significant Results and Procedures   See below    Pending Results     Unresulted Labs Ordered in the Past 30 Days of this Admission     Date and Time Order Name Status Description    10/28/2021  8:40 AM Prepare red blood cells (unit) Preliminary           Code Status   Full Code       Primary Care Physician   Aye Wong    Physical Exam   Temp: 98.5  F (36.9  C) Temp src: Oral BP: 107/51 Pulse: 91   Resp: 16 SpO2: 94 % O2 Device: None (Room air)      Constitutional: AAOX3, NAD  Respiratory: CTA B/L, Normal WOB  Cardiovascular: RRR, No murmur  GI: Soft, Non- tender, BS- normoactive  Neuro: CN- grossly intact.     Discharge Disposition   Discharged to home  Condition at discharge: Stable    Consultations This Hospital Stay   ORTHOPEDIC SURGERY IP CONSULT  WOUND OSTOMY CONTINENCE NURSE  IP CONSULT  PHYSICAL THERAPY ADULT IP CONSULT  OCCUPATIONAL THERAPY ADULT IP CONSULT    Time Spent on this Encounter   I, Estevan Robins MD, personally saw the patient today and spent greater than 30 minutes discharging this patient.    Discharge Orders      Home Care OT Referral for Hospital Discharge      Home Care PT Referral for Hospital Discharge      Contact Surgeon Team    You may experience symptoms that require follow-up before your scheduled appointment. Refer to the \"Stoplight Tool\" for instructions on when to contact your Surgeon Team if you are concerned about pain control, blood clots, constipation, or if you are unable to urinate.     Orthopedic Urgent Care    If you are not able to reach your Surgeon Team and you need immediate care, go to the Orthopedic Urgent Care at your Surgeon's office.  Do NOT go to the Emergency Room unless you have shortness of breath, chest pain, or other signs of a medical emergency.     Call 911    Call 911 immediately if you experience sudden-onset chest pain, arm " weakness/numbness, slurred speech, or shortness of breath     Breathing exercises    Perform breathing exercises using your Incentive Spirometer 10 times per hour while awake for 2 weeks.     Fever Management    A low grade fever can be expected after surgery.  Use acetaminophen (TYLENOL) as needed for fever management.  Contact your Surgeon Team if you have a fever greater than 101.5 F, chills, and/or night sweats.     Constipation management    Constipation (hard, dry bowel movements) is expected after surgery due to the combination of being less active, the anesthetic, and the opioid pain medication.  You can do the following to help reduce constipation:  ~  FLUIDS:  Drink clear liquids (water or Gatorade), or juice (apple/prune).  ~  DIET:  Eat a fiber rich diet.    ~  ACTIVITY:  Get up and move around several times a day.  Increase your activity as you are able.  MEDICATIONS:  Reduce the risk of constipation by starting medications before you are constipated.  You can take Miralax   (1 packet as directed) and/or a stool softener (Senokot 1-2 tablets 1-2 times a day).  If you already have constipation and these medications are not working, you can get magnesium citrate and use as directed.  If you continue to have constipation you can try an over the counter suppository or enema.  Call your Surgeon Team if it has been greater than 3 days since your last bowel movement.     Reduced Urine Output    Changes in the amount of fluids you drank before and after surgery may result in problems urinating.  It is important to stay well-hydrated after surgery and drink plenty of water. If it has been greater than 8 hours since you have urinated despite drinking plenty of water, call your Surgeon Team.     Anticoagulation - aspirin    Take the aspirin as prescribed for a total of four weeks after surgery.  This is given to help minimize your risk of blood clot.     Activity - Exercises to prevent blood clots    Unless  otherwise directed by your Surgeon team, perform the following exercises at least three times per day for the first four weeks after surgery to prevent blood clots in your legs: 1) Point and flex your feet (Ankle Pumps), 2) Move your ankle around in big circles, 3) Wiggle your toes, 4) Walk, even for short distances, several times a day, will help decrease the risk of blood clots.     Pain after Surgery    Pain after surgery is normal and expected.  You will   have some amount of pain for several weeks after surgery.  Your pain will improve with time.  There are several things you can do to help reduce your pain including: rest, ice, elevation, and using pain medications as needed. Contact your Surgeon Team if you have pain that persists or worsens after surgery despite rest, ice, elevation, and taking your medication(s) as prescribed. Contact your Surgeon Team if you have new numbness, tingling, or weakness in your operative extremity.     Swelling after Surgery    Swelling and/or bruising of the surgical extremity is common and may persist for several months after surgery. In addition to frequent icing and elevation, gentle compressive support with an ACE wrap or tubigrip may help with swelling. Apply compression regularly, removing at least twice daily to perform skin checks. Contact your Surgeon Team if your swelling increases and is NOT associated with an increase in your activity level, or if your swelling increases and is associated with redness and pain.     Cold therapy    Ice can be used to control swelling and discomfort after surgery. Place a thin towel over your operative site and apply the ice pack overtop. Leave ice pack in place for 20 minutes, then remove for 20 minutes. Repeat this 20 minutes on/20 minutes off routine as often as tolerated.     acetaminophen (TYLENOL) Instructions    You were discharged with acetaminophen (TYLENOL) for pain management after surgery. Acetaminophen most effectively  manages pain symptoms when it is taken on a schedule without missing doses (every four, six, or eight hours). Your Provider will prescribe a safe daily dose between 3000 - 4000 mg.  Do NOT exceed this daily dose. Most patients use acetaminophen for pain control for the first four weeks after surgery.  You can wean from this medication as your pain decreases.     NSAID Instructions    You were discharged with an anti-inflammatory medication for pain management to use in combination with acetaminophen (TYLENOL) and the narcotic pain medication.  Take this medication exactly as directed.  You should only take one anti-inflammatory at a time.  Some common anti-inflammatories include: ibuprofen (ADVIL, MOTRIN), naproxen (ALEVE, NAPROSYN), celecoxib (CELEBREX), meloxicam (MOBIC), ketorolac (TORADOL).  Take this medication with food and water.     Opioids - Tapering Instructions    In the first three days following surgery, your symptoms may warrant use of the narcotic pain medication every four to six hours as prescribed. This is normal. As your pain symptoms improve, focus your efforts on decreasing (tapering) use of narcotic medications. The most successful tapering strategy is to first, decrease the number of tablets you take every 4-6 hours to the minimum prescribed. Then, increase the amount of time between doses.  For example:  First, taper to   or 1 tablet every 4-6 hours.  Then, taper to   or 1 tablet every 6-8 hours.  Then, taper to   or 1 tablet every 8-10 hours.  Then, taper to   or 1 tablet every 10-12 hours.  Then, taper to   or 1 tablet at bedtime.  The bedtime dose can help with comfort during sleep and is typically the last dose to be discontinued after surgery.     Follow Up Care    Please call Veronica [phone] to schedule a follow up appointment for 2 weeks from the date of your surgery if you do not already have an appointment already.     Activity - Total Hip Arthroplasty    Refer to the [facility]  "[place] \"Your Guide to Total Joint Replacement\" for recommendations on activities and Exercises.     Return to Driving    Return to driving - Driving is NOT permitted until directed by your provider. Under no circumstance are you permitted to drive while using narcotic pain medications.     No precautions    No precautions directed by your Provider.  You may perform range of motion activities as tolerated.     Weight bearing as tolerated    Weight bearing as tolerated on your operative extremity.     Dressing / Wound Care - Wound    You have a clean dressing on your surgical wound. Dressing change instructions as follows: change your dressing daily until your follow-up appointment. Contact your Surgeon Team if you have increased redness, warmth around the surgical wound, and/or drainage from the surgical wound.     Shower with wound/dressing covered    You must COVER your dressing or incision with saran wrap (or any other non-permeable covering) to allow the incision to remain dry while showering.  You may shower 3 days after surgery as long as the surgical wound stays dry. Continue to cover your dressing or incision for showering until your first office visit.  You are strictly prohibited from soaking   or submerging the surgical wound underwater.     Dressing / Wound Care - NO Tub Bathing    Tub bathing, swimming, or any other activities that will cause your incision to be submerged in water should be avoided until allowed by your Surgeon.     Reason for your hospital stay    Hip fracture     LOWER Extremity Elevation    Swelling is expected for several months after surgery. This type of swelling is usually associated with gravity and activity, and can be improved with elevation.   The best way to do this is to get your \"toes above your nose\" by laying down and placing several pillows lengthwise under your calf and heel. When elevating your leg keep your knee completely straight. Perform this elevation as often " as possible especially for the first two weeks after surgery.     Opioid Instructions (Less than 65 years)    You were discharged with an opioid medication (hydromorphone, oxycodone, hydrocodone, or tramadol). This medication should only be taken for breakthrough pain that is not controlled with acetaminophen (TYLENOL). If you rate your pain less than 3 you do not need this medication.  Pain rating 0-3:  You do not need this medication.  Pain rating 4-6:  Take 1 tablet every 4-6 hours as needed  Pain rating 7-10:  Take 2 tablets every 4-6 hours as needed.  Do not exceed 6 tablets per day     MD face to face evaluation    Documentation of Face to Face and Certification for Home Health Services    I certify that patient: Vandana Gonzalez is under my care and that I, or a nurse practitioner or physician's assistant working with me, had a face-to-face encounter that meets the physician face-to-face encounter requirements with this patient on: 10/30/2021.    This encounter with the patient was in whole, or in part, for the following medical condition, which is the primary reason for home health care: Difficulty mobilizing and performing ADLs after total hip replacement for femoral neck fracture. .    I certify that, based on my findings, the following services are medically necessary home health services: Occupational Therapy and Physical Therapy.    My clinical findings support the need for the above services because: Occupational Therapy Services are needed to assess and treat cognitive ability and address ADL safety due to impairment in mobility. and Physical Therapy Services are needed to assess and treat the following functional impairments: ambulation, range of motion and strength.    Further, I certify that my clinical findings support that this patient is homebound (i.e. absences from home require considerable and taxing effort and are for medical reasons or [employer] services or infrequently or of short duration when  for other reasons) because: Requires assistance of another person or specialized equipment to access medical services because patient: Has prohibitive pain during ambulation...    Based on the above findings. I certify that this patient is confined to the home and needs intermittent skilled nursing care, physical therapy and/or speech therapy.  The patient is under my care, and I have initiated the establishment of the plan of care.  This patient will be followed by a physician who will periodically review the plan of care.  Physician/Provider to provide follow up care: Aye Osorio    Attending hospital physician (the Medicare certified [facility] provider): Dr. Pena  Physician Signature: See electronic signature associated with these discharge orders.  Date: 10/30/2021     Crutches DME    DME Documentation: Describe the reason for need to support medical necessity: Impaired gait status post hip surgery. I, the undersigned, certify that the above prescribed supplies are medically necessary for this patient and is both reasonable and necessary in reference to accepted standards of medical practice in the treatment of this patient's condition and is not prescribed as a convenience.     Cane DME    DME Documentation: Describe the reason for need to support medical necessity: Impaired gait status post hip surgery. I, the undersigned, certify that the above prescribed supplies are medically necessary for this patient and is both reasonable and necessary in reference to accepted standards of medical practice in the treatment of this patient's condition and is not prescribed as a convenience.     Walker DME    : DME Documentation: Describe the reason for need to support medical necessity: Impaired gait status post hip surgery. I, the undersigned, certify that the above prescribed supplies are medically necessary for this patient and is both reasonable and necessary in reference to accepted standards of  medical practice in the treatment of this patient's condition and is not prescribed as a convenience.     Walker Order    DME Documentation:   Describe the reason for need to support medical necessity: impaired gait s/p R TRUNG.     I, the undersigned, certify that the above prescribed supplies are medically necessary for this patient and is both reasonable and necessary in reference to accepted standards of medical and necessary in reference to accepted standards of medical practice in the treatment of this patient's condition and is not prescribed as a convenience.     Regular Diet Adult     Discharge Medications   Current Discharge Medication List      START taking these medications    Details   acetaminophen (TYLENOL) 325 MG tablet Take 2 tablets (650 mg) by mouth every 4 hours as needed for other (mild pain)  Qty: 100 tablet, Refills: 0    Associated Diagnoses: Hip fracture, right, closed, initial encounter (H)      aspirin 81 MG EC tablet Take 1 tablet (81 mg) by mouth 2 times daily  Qty: 60 tablet, Refills: 0    Associated Diagnoses: Hip fracture, right, closed, initial encounter (H)      oxyCODONE (ROXICODONE) 5 MG tablet Take 1 tablet (5 mg) by mouth every 4 hours as needed  Qty: 30 tablet, Refills: 0    Associated Diagnoses: Hip fracture, right, closed, initial encounter (H)      polyethylene glycol (MIRALAX) 17 g packet Take 17 g by mouth daily  Qty: 7 packet, Refills: 0    Associated Diagnoses: Hip fracture, right, closed, initial encounter (H)      senna-docusate (SENOKOT-S/PERICOLACE) 8.6-50 MG tablet Take 1-2 tablets by mouth 2 times daily Take while on oral narcotics to prevent or treat constipation.  Qty: 30 tablet, Refills: 0    Comments: While taking narcotics  Associated Diagnoses: Hip fracture, right, closed, initial encounter (H)         CONTINUE these medications which have NOT CHANGED    Details   ferrous sulfate (FEROSUL) 325 (65 Fe) MG tablet Take 1 tablet (325 mg) by mouth daily (with  breakfast)  Qty: 30 tablet, Refills: 0      hydrochlorothiazide (HYDRODIURIL) 12.5 MG tablet Take 1 tablet (12.5 mg) by mouth daily  Qty: 60 tablet, Refills: 3    Associated Diagnoses: Primary hypertension      pantoprazole (PROTONIX) 40 MG EC tablet Take 1 tablet (40 mg) by mouth 2 times daily  Qty: 60 tablet, Refills: 0    Comments: Future refills by PCP Dr. Chavez who can be reached at his office : [phone].  Associated Diagnoses: Anemia, unspecified type      sucralfate (CARAFATE) 1 GM tablet Take 1 g by mouth 4 times daily      sucralfate (CARAFATE) 1 GM/10ML suspension Take 10 mLs (1 g) by mouth 4 times daily (before meals and nightly)  Qty: 420 mL, Refills: 1    Comments: Future refills by PCP Dr. Chavez who can be reached at his office : [phone].  Associated Diagnoses: Anemia, unspecified type           Allergies   Allergies   Allergen Reactions     No Known Allergies      Gadolinium Rash     Data   Most Recent 3 CBC's:  Recent Labs   Lab Test 10/30/21  0639 10/29/21  1919 10/29/21  0758 10/28/21  1233 10/28/21  0750 10/27/21  0956 10/27/21  0956   WBC  --   --  7.5  --  5.6  --  8.7   HGB 8.0* 8.1* 7.8*   < > 9.0*   < > 9.3*   MCV  --   --  76*  --  76*  --  74*   PLT  --   --  324  --  343  --  433    < > = values in this interval not displayed.      Most Recent 3 BMP's:  Recent Labs   Lab Test 10/30/21  0639 10/30/21  0629 10/29/21  0758 10/28/21  0750 10/28/21  0239 10/27/21  0956 10/14/21  1011 10/14/21  1011   NA  --   --   --  136  --  133  --  135   POTASSIUM 3.5  --   --  3.6  --  4.0   < > 3.8   CHLORIDE  --   --   --  107  --  101  --  104   CO2  --   --   --  24  --  24  --  25   BUN  --   --   --  4*  --  11  --  11   CR  --   --   --  0.39*  --  0.43*  --  0.51*   ANIONGAP  --   --   --  5  --  8  --  6   MATTEO  --   --   --  8.3*  --  8.6  --  9.2   GLC  --  101* 111* 108*   < > 132*   < > 106*    < > = values in this interval not displayed.     Most Recent 2 LFT's:  Recent Labs   Lab  Test 10/14/21  1011   AST 29   ALT 31   ALKPHOS 132   BILITOTAL 0.4     Most Recent INR's and Anticoagulation Dosing History:  Anticoagulation Dose History     Recent Dosing and Labs Latest Ref Rng & Units 10/7/2021    INR 0.85 - 1.15 1.12        Most Recent 3 Troponin's:No lab results found.  Most Recent Cholesterol Panel:No lab results found.  Most Recent 6 Bacteria Isolates From Any Culture (See EPIC Reports for Culture Details):No lab results found.  Most Recent TSH, T4 and A1c Labs:No lab results found.    Results for orders placed or performed during the hospital encounter of 10/27/21   XR Pelvis w Hip Right 1 View    Narrative    XR PELVIS AND HIP RIGHT 1 VIEW 10/27/2021 10:19 AM     HISTORY: right hip      Impression    IMPRESSION: Right femoral neck fracture. Hip joint space width appears  within normal limits.    DOUG RENEE MD         SYSTEM ID:  [id number]   XR Surgery COURTNEY Fluoro L/T 5 Min w Stills    Narrative    XR SURGERY COURTNEY FLUORO LESS THAN 5 MIN W STILLS 10/28/2021 11:35 AM     HISTORY: Right Anterior Total Hip Arthroplasty    NUMBER OF IMAGES ACQUIRED: 3    FLUOROSCOPY TIME: .5      Impression    IMPRESSION: Spot images demonstrate placement and positioning of total  hip arthroplasty.    DOUG RENEE MD         SYSTEM ID:  [id number]   XR Pelvis w Hip Port Right 1 View    Narrative    XR PELVIS AD HIP PORTABLE RIGHT 1 VIEW 10/28/2021 1:59 PM     HISTORY: evaluate prosthesis    COMPARISON: Fluoroscopic images from the same day.       Impression    IMPRESSION: Interval right total hip replacement. There is no  dislocation or periprosthetic fracture. Hardware appears well seated.  Bones are demineralized.    ROB SAM MD         SYSTEM ID:  [id number]   Echocardiogram Complete     Value    LVEF  55-60%    Narrative    [id number]  [id number]  [id number]  [id number]^MIRA^BERNICE^MARGY     [facility]  Echocardiography Laboratory  [address] [place] [address]     Name:  PRERNA DALE  MRN: [id number]  : 1960  Study Date: 10/27/2021 02:24 PM  Age: 61 yrs  Gender: Female  Patient Location: [facility]  Reason For Study: Syncope  Ordering Physician: BERNICE MEYERS  Referring Physician: BERNICE MEYERS  Performed By: Juan Diego Angel RDCS     BSA: 2.1 m2  Height: 69 in  Weight: 200 lb  HR: 79  BP: 141/77 mmHg  ______________________________________________________________________________  Procedure  Complete Portable Echo Adult. Optison (NDC #[id number]) given intravenously.  ______________________________________________________________________________  Interpretation Summary     Left ventricular systolic function is normal.  The visual ejection fraction is 55-60%.  Grade I or early diastolic dysfunction.  The right ventricle is normal in structure, function and size.  No significant valve dysfunction.  Dilation of the inferior vena cava is present with normal respiratory  variation in diameter.  Moderate aortic root dilatation (4.1 cm).  The ascending aorta is Moderately dilated (4.2 cm).  The patient exhibited PVCs.     No prior for comparison.  ______________________________________________________________________________  Left Ventricle  The left ventricle is normal in structure, function and size. The visual  ejection fraction is 55-60%. Left ventricular systolic function is normal.  Grade I or early diastolic dysfunction. Normal left ventricular wall motion.     Right Ventricle  The right ventricle is normal in structure, function and size.     Atria  The left atrium is mildly dilated. Right atrial size is normal.     Mitral Valve  The mitral valve is normal in structure and function.     Tricuspid Valve  The tricuspid valve is normal in structure and function. Right ventricle  systolic pressure estimate normal.     Aortic Valve  There is mild trileaflet aortic sclerosis.     Pulmonic Valve  The pulmonic valve is not well visualized.     Vessels  Moderate aortic  root dilatation. The ascending aorta is Moderately dilated.  Dilation of the inferior vena cava is present with normal respiratory  variation in diameter.     Pericardium  The pericardium appears normal.     Rhythm  The patient exhibited PVCs.  ______________________________________________________________________________  MMode/2D Measurements & Calculations  IVSd: 1.4 cm     LVIDd: 4.8 cm  LVIDs: 3.2 cm  LVPWd: 0.92 cm  FS: 33.8 %  LV mass(C)d: 212.6 grams  LV mass(C)dI: 102.9 grams/m2  Ao root diam: 3.9 cm  LA dimension: 4.8 cm  asc Aorta Diam: 4.2 cm  LA/Ao: 1.2  LA Volume (BP): 76.4 ml  LA Volume Index (BP): 36.9 ml/m2  RWT: 0.38     Doppler Measurements & Calculations  MV E max wilner: 61.6 cm/sec  MV A max wilner: 110.1 cm/sec  MV E/A: 0.56  MV dec slope: 137.8 cm/sec2  PA acc time: 0.16 sec  E/E' av.4  Lateral E/e': 7.2  Medial E/e': 9.6     ______________________________________________________________________________  Report approved by: Carol Bass 10/27/2021 04:38 PM

## 2021-11-01 ENCOUNTER — PATIENT OUTREACH (OUTPATIENT)
Dept: CARE COORDINATION | Facility: CLINIC | Age: 61
End: 2021-11-01

## 2021-11-01 DIAGNOSIS — Z71.89 OTHER SPECIFIED COUNSELING: ICD-10-CM

## 2021-11-02 ENCOUNTER — TELEPHONE (OUTPATIENT)
Dept: FAMILY MEDICINE | Facility: CLINIC | Age: 61
End: 2021-11-02

## 2021-11-02 NOTE — TELEPHONE ENCOUNTER
Dr Weber: [email or web address] calling requesting verbal confirmation that you are ok with following patient for homecare. Last saw patient on 10/6/21, please advise and route back to triage.     Kacy, [facility].  PCP was listed as Shey in [employer] system. Writer notified Kacy that Shey is not a primary care physician. Writer notify Kacy that patient last saw Dr. Weber on 10/6/21.   Looking for verbal confirmation, okay to follow pateint for homecare service    Callback: [phone]- ok to leave detiled   Edwige Ash RN  [facility]

## 2021-11-03 NOTE — TELEPHONE ENCOUNTER
Called Kacy at [facility], Left detailed message with information below and to return call to Triage RN.    Edwige Ash, HECTOR  [facility]

## 2021-11-09 ENCOUNTER — MEDICAL CORRESPONDENCE (OUTPATIENT)
Dept: HEALTH INFORMATION MANAGEMENT | Facility: CLINIC | Age: 61
End: 2021-11-09
Payer: COMMERCIAL

## 2021-11-11 ENCOUNTER — TRANSFERRED RECORDS (OUTPATIENT)
Dept: HEALTH INFORMATION MANAGEMENT | Facility: CLINIC | Age: 61
End: 2021-11-11
Payer: COMMERCIAL

## 2021-11-12 ENCOUNTER — TRANSFERRED RECORDS (OUTPATIENT)
Dept: HEALTH INFORMATION MANAGEMENT | Facility: CLINIC | Age: 61
End: 2021-11-12
Payer: COMMERCIAL

## 2021-12-25 ENCOUNTER — HEALTH MAINTENANCE LETTER (OUTPATIENT)
Age: 61
End: 2021-12-25

## 2022-01-10 ENCOUNTER — TRANSFERRED RECORDS (OUTPATIENT)
Dept: HEALTH INFORMATION MANAGEMENT | Facility: CLINIC | Age: 62
End: 2022-01-10
Payer: COMMERCIAL

## 2022-01-12 ENCOUNTER — MEDICAL CORRESPONDENCE (OUTPATIENT)
Dept: HEALTH INFORMATION MANAGEMENT | Facility: CLINIC | Age: 62
End: 2022-01-12
Payer: COMMERCIAL

## 2022-01-13 ENCOUNTER — LAB (OUTPATIENT)
Dept: LAB | Facility: CLINIC | Age: 62
End: 2022-01-13
Payer: COMMERCIAL

## 2022-01-13 DIAGNOSIS — T84.84XA PAIN IN HIP REGION AFTER TOTAL HIP REPLACEMENT (H): Primary | ICD-10-CM

## 2022-01-13 DIAGNOSIS — Z96.649 PAIN IN HIP REGION AFTER TOTAL HIP REPLACEMENT (H): Primary | ICD-10-CM

## 2022-01-13 LAB
BASOPHILS # BLD AUTO: 0 10E3/UL (ref 0–0.2)
BASOPHILS NFR BLD AUTO: 0 %
CRP SERPL-MCNC: <2.9 MG/L (ref 0–8)
EOSINOPHIL # BLD AUTO: 0.1 10E3/UL (ref 0–0.7)
EOSINOPHIL NFR BLD AUTO: 2 %
ERYTHROCYTE [DISTWIDTH] IN BLOOD BY AUTOMATED COUNT: 16.9 % (ref 10–15)
ERYTHROCYTE [SEDIMENTATION RATE] IN BLOOD BY WESTERGREN METHOD: 26 MM/HR (ref 0–30)
HCT VFR BLD AUTO: 34.4 % (ref 35–47)
HGB BLD-MCNC: 10.1 G/DL (ref 11.7–15.7)
IMM GRANULOCYTES # BLD: 0 10E3/UL
IMM GRANULOCYTES NFR BLD: 0 %
LYMPHOCYTES # BLD AUTO: 1.6 10E3/UL (ref 0.8–5.3)
LYMPHOCYTES NFR BLD AUTO: 30 %
MCH RBC QN AUTO: 23.2 PG (ref 26.5–33)
MCHC RBC AUTO-ENTMCNC: 29.4 G/DL (ref 31.5–36.5)
MCV RBC AUTO: 79 FL (ref 78–100)
MONOCYTES # BLD AUTO: 0.4 10E3/UL (ref 0–1.3)
MONOCYTES NFR BLD AUTO: 7 %
NEUTROPHILS # BLD AUTO: 3.2 10E3/UL (ref 1.6–8.3)
NEUTROPHILS NFR BLD AUTO: 61 %
NRBC # BLD AUTO: 0 10E3/UL
NRBC BLD AUTO-RTO: 0 /100
PLATELET # BLD AUTO: 391 10E3/UL (ref 150–450)
RBC # BLD AUTO: 4.36 10E6/UL (ref 3.8–5.2)
WBC # BLD AUTO: 5.2 10E3/UL (ref 4–11)

## 2022-01-13 PROCEDURE — 85004 AUTOMATED DIFF WBC COUNT: CPT

## 2022-01-13 PROCEDURE — 36415 COLL VENOUS BLD VENIPUNCTURE: CPT

## 2022-01-13 PROCEDURE — 86140 C-REACTIVE PROTEIN: CPT

## 2022-01-13 PROCEDURE — 85652 RBC SED RATE AUTOMATED: CPT

## 2022-01-26 DIAGNOSIS — Z53.9 DIAGNOSIS NOT YET DEFINED: Primary | ICD-10-CM

## 2022-01-26 PROCEDURE — 99207 PR MD CERTIFICATION HHA PATIENT: CPT | Performed by: INTERNAL MEDICINE

## 2022-02-07 ENCOUNTER — LAB REQUISITION (OUTPATIENT)
Dept: LAB | Facility: CLINIC | Age: 62
End: 2022-02-07
Payer: COMMERCIAL

## 2022-02-07 DIAGNOSIS — T84.84XA PAIN DUE TO INTERNAL ORTHOPEDIC PROSTHETIC DEVICES, IMPLANTS AND GRAFTS, INITIAL ENCOUNTER (H): ICD-10-CM

## 2022-02-07 LAB
APPEARANCE FLD: ABNORMAL
COLOR FLD: ABNORMAL
EOSINOPHIL NFR FLD MANUAL: 3 %
GRAM STAIN RESULT: NORMAL
GRAM STAIN RESULT: NORMAL
LYMPHOCYTES NFR FLD MANUAL: 45 %
MONOS+MACROS NFR FLD MANUAL: NORMAL %
NEUTS BAND NFR FLD MANUAL: 52 %
WBC # FLD AUTO: 185 /UL

## 2022-02-07 PROCEDURE — 87075 CULTR BACTERIA EXCEPT BLOOD: CPT | Mod: ORL | Performed by: PHYSICIAN ASSISTANT

## 2022-02-07 PROCEDURE — 87205 SMEAR GRAM STAIN: CPT | Mod: ORL | Performed by: PHYSICIAN ASSISTANT

## 2022-02-07 PROCEDURE — 89051 BODY FLUID CELL COUNT: CPT | Mod: ORL | Performed by: PHYSICIAN ASSISTANT

## 2022-02-07 PROCEDURE — 87070 CULTURE OTHR SPECIMN AEROBIC: CPT | Mod: ORL | Performed by: PHYSICIAN ASSISTANT

## 2022-02-12 LAB — BACTERIA SNV CULT: NO GROWTH

## 2022-02-19 ENCOUNTER — HEALTH MAINTENANCE LETTER (OUTPATIENT)
Age: 62
End: 2022-02-19

## 2022-02-21 ENCOUNTER — MEDICAL CORRESPONDENCE (OUTPATIENT)
Dept: HEALTH INFORMATION MANAGEMENT | Facility: CLINIC | Age: 62
End: 2022-02-21
Payer: COMMERCIAL

## 2022-02-21 DIAGNOSIS — Z00.00 ROUTINE GENERAL MEDICAL EXAMINATION AT A HEALTH CARE FACILITY: ICD-10-CM

## 2022-02-21 DIAGNOSIS — Z87.891 PERSONAL HISTORY OF TOBACCO USE: Primary | ICD-10-CM

## 2022-02-21 DIAGNOSIS — S82.209A TIBIA FRACTURE: ICD-10-CM

## 2022-02-21 DIAGNOSIS — Z87.81 PERSONAL HISTORY OF TRAUMATIC FRACTURE: ICD-10-CM

## 2022-02-21 LAB — BACTERIA SNV CULT: NORMAL

## 2022-02-23 ENCOUNTER — LAB (OUTPATIENT)
Dept: LAB | Facility: CLINIC | Age: 62
End: 2022-02-23
Payer: COMMERCIAL

## 2022-02-23 ENCOUNTER — HOSPITAL ENCOUNTER (OUTPATIENT)
Dept: BONE DENSITY | Facility: CLINIC | Age: 62
Discharge: HOME | End: 2022-02-23
Attending: NURSE PRACTITIONER | Admitting: NURSE PRACTITIONER
Payer: COMMERCIAL

## 2022-02-23 DIAGNOSIS — Z87.81 HISTORY OF FRACTURE: ICD-10-CM

## 2022-02-23 DIAGNOSIS — Z78.0 POSTMENOPAUSAL: ICD-10-CM

## 2022-02-23 DIAGNOSIS — Z11.4 SCREENING FOR HIV (HUMAN IMMUNODEFICIENCY VIRUS): ICD-10-CM

## 2022-02-23 DIAGNOSIS — Z13.220 SCREENING FOR HYPERLIPIDEMIA: ICD-10-CM

## 2022-02-23 DIAGNOSIS — Z00.00 HEALTHCARE MAINTENANCE: ICD-10-CM

## 2022-02-23 DIAGNOSIS — Z87.891 PERSONAL HISTORY OF TOBACCO USE: ICD-10-CM

## 2022-02-23 DIAGNOSIS — Z00.00 ROUTINE GENERAL MEDICAL EXAMINATION AT A HEALTH CARE FACILITY: ICD-10-CM

## 2022-02-23 DIAGNOSIS — E55.9 AVITAMINOSIS D: ICD-10-CM

## 2022-02-23 DIAGNOSIS — S82.209A TIBIA FRACTURE: Primary | ICD-10-CM

## 2022-02-23 DIAGNOSIS — E55.9 VITAMIN D INSUFFICIENCY: ICD-10-CM

## 2022-02-23 DIAGNOSIS — S82.209A TIBIA FRACTURE: ICD-10-CM

## 2022-02-23 DIAGNOSIS — Z87.81 PERSONAL HISTORY OF TRAUMATIC FRACTURE: ICD-10-CM

## 2022-02-23 DIAGNOSIS — Z11.59 NEED FOR HEPATITIS C SCREENING TEST: ICD-10-CM

## 2022-02-23 LAB — T3FREE SERPL-MCNC: 2.4 PG/ML (ref 2.3–4.2)

## 2022-02-23 PROCEDURE — 36415 COLL VENOUS BLD VENIPUNCTURE: CPT

## 2022-02-23 PROCEDURE — 87389 HIV-1 AG W/HIV-1&-2 AB AG IA: CPT

## 2022-02-23 PROCEDURE — 82306 VITAMIN D 25 HYDROXY: CPT

## 2022-02-23 PROCEDURE — 84439 ASSAY OF FREE THYROXINE: CPT

## 2022-02-23 PROCEDURE — 86803 HEPATITIS C AB TEST: CPT

## 2022-02-23 PROCEDURE — 80053 COMPREHEN METABOLIC PANEL: CPT

## 2022-02-23 PROCEDURE — 84100 ASSAY OF PHOSPHORUS: CPT

## 2022-02-23 PROCEDURE — 84443 ASSAY THYROID STIM HORMONE: CPT

## 2022-02-23 PROCEDURE — 80061 LIPID PANEL: CPT

## 2022-02-23 PROCEDURE — 77080 DXA BONE DENSITY AXIAL: CPT

## 2022-02-23 PROCEDURE — 84481 FREE ASSAY (FT-3): CPT

## 2022-02-24 LAB
ALBUMIN SERPL-MCNC: 3.7 G/DL (ref 3.4–5)
ALP SERPL-CCNC: 122 U/L (ref 40–150)
ALT SERPL W P-5'-P-CCNC: 23 U/L (ref 0–50)
ANION GAP SERPL CALCULATED.3IONS-SCNC: 9 MMOL/L (ref 3–14)
AST SERPL W P-5'-P-CCNC: 21 U/L (ref 0–45)
BILIRUB SERPL-MCNC: 0.6 MG/DL (ref 0.2–1.3)
BUN SERPL-MCNC: 6 MG/DL (ref 7–30)
CALCIUM SERPL-MCNC: 9.3 MG/DL (ref 8.5–10.1)
CHLORIDE BLD-SCNC: 103 MMOL/L (ref 94–109)
CHOLEST SERPL-MCNC: 230 MG/DL
CO2 SERPL-SCNC: 23 MMOL/L (ref 20–32)
CREAT SERPL-MCNC: 0.46 MG/DL (ref 0.52–1.04)
DEPRECATED CALCIDIOL+CALCIFEROL SERPL-MC: 22 UG/L (ref 20–75)
FASTING STATUS PATIENT QL REPORTED: NO
GFR SERPL CREATININE-BSD FRML MDRD: >90 ML/MIN/1.73M2
GLUCOSE BLD-MCNC: 116 MG/DL (ref 70–99)
HDLC SERPL-MCNC: 76 MG/DL
HIV 1+2 AB+HIV1 P24 AG SERPL QL IA: NONREACTIVE
LDLC SERPL CALC-MCNC: 138 MG/DL
NONHDLC SERPL-MCNC: 154 MG/DL
PHOSPHATE SERPL-MCNC: 3.9 MG/DL (ref 2.5–4.5)
POTASSIUM BLD-SCNC: 3.9 MMOL/L (ref 3.4–5.3)
PROT SERPL-MCNC: 7.9 G/DL (ref 6.8–8.8)
SODIUM SERPL-SCNC: 135 MMOL/L (ref 133–144)
T4 FREE SERPL-MCNC: 0.99 NG/DL (ref 0.76–1.46)
TRIGL SERPL-MCNC: 78 MG/DL
TSH SERPL DL<=0.005 MIU/L-ACNC: 2.42 MU/L (ref 0.4–4)

## 2022-02-24 NOTE — RESULT ENCOUNTER NOTE
Pls CALL the patient and have her make an appointment to establish care with a pcp to establish and discuss possible treatment for lipids

## 2022-02-25 LAB — HCV AB SERPL QL IA: NONREACTIVE

## 2022-02-25 NOTE — PROGRESS NOTES
"  Assessment & Plan     Dyslipidemia  Lipids reviewed.  ASCVD risk calculated.  No lynsey indication to start statin.  The patient is also hesitant.  She has esophageal issues and is keen on minimizing medications.      Diet changes discussed.  Repeat and consider statin if indicated.    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  Problem list updated.      Hypertension, unspecified type  Elevated here. On repeat also. Advised to check periodically at home.  RTC 3 months. If continues to be elevated would change medication.      Visit for screening mammogram    - MA SCREENING DIGITAL BILAT - Future  (s+30)    Screen for colon cancer  She will be seeing outside GI      Review of prior external note(s) from - Outside records from [facility]  33 minutes spent on the date of the encounter doing         BMI:   Estimated body mass index is 28.21 kg/m  as calculated from the following:    Height as of this encounter: 1.753 m (5' 9\").    Weight as of this encounter: 86.6 kg (191 lb).       3 months with repeat lipids    Return in about 3 months (around 5/28/2022) for Follow up, with me.    Antonio Mccauley MD  [facility] [place] is a 61 year old who presents for the following health issues     History of Present Illness     Reason for visit:  Discuss high lipids  Symptom onset:  More than a month  Symptoms include:  None    She eats 0-1 servings of fruits and vegetables daily.She consumes 1 sweetened beverage(s) daily.She exercises with enough effort to increase her heart rate 9 or less minutes per day.  She exercises with enough effort to increase her heart rate 3 or less days per week.   She is taking medications regularly.     HL:  On recent labs. No h/o similar.  No Fhx HL per patient.      The 10-year ASCVD risk score (Thornton ALLIE Jr., et al., 2013) is: 4.7%    Values used to calculate the score:      Age: 61 years      Sex: Female    " "  Is Non- : No      Diabetic: No      Tobacco smoker: No      Systolic Blood Pressure: 154 mmHg      Is BP treated: No      HDL Cholesterol: 76 mg/dL      Total Cholesterol: 230 mg/dL      Review of Systems   Constitutional, HEENT, cardiovascular, pulmonary, gi and gu systems are negative, except as otherwise noted.      Objective    BP (!) 154/66 (BP Location: Right arm, Patient Position: Chair, Cuff Size: Adult Large)   Pulse 84   Temp 98.6  F (37  C) (Temporal)   Resp 16   Ht 1.753 m (5' 9\")   Wt 86.6 kg (191 lb)   LMP 08/05/2012   SpO2 100%   BMI 28.21 kg/m    Body mass index is 28.21 kg/m .  Physical Exam   GENERAL: healthy, alert and no distress  NECK: no adenopathy, no asymmetry, masses, or scars and thyroid normal to palpation  RESP: lungs clear to auscultation - no rales, rhonchi or wheezes  CV: regular rate and rhythm, normal S1 S2, no S3 or S4, no murmur, click or rub, no peripheral edema and peripheral pulses strong  ABDOMEN: soft, nontender, no hepatosplenomegaly, no masses and bowel sounds normal                "

## 2022-02-25 NOTE — RESULT ENCOUNTER NOTE
Dear Vandana,     Here are your the rest of your recent results which are within the expected range. Your screening hepatitis c test was negative. Please continue with your current plan of care and let us know if you have any questions or concerns.    Regards,  Aye Wong PA-C

## 2022-02-28 ENCOUNTER — OFFICE VISIT (OUTPATIENT)
Dept: FAMILY MEDICINE | Facility: CLINIC | Age: 62
End: 2022-02-28
Payer: COMMERCIAL

## 2022-02-28 VITALS
WEIGHT: 191 LBS | DIASTOLIC BLOOD PRESSURE: 66 MMHG | TEMPERATURE: 98.6 F | HEART RATE: 84 BPM | BODY MASS INDEX: 28.29 KG/M2 | SYSTOLIC BLOOD PRESSURE: 154 MMHG | OXYGEN SATURATION: 100 % | RESPIRATION RATE: 16 BRPM | HEIGHT: 69 IN

## 2022-02-28 DIAGNOSIS — E78.5 DYSLIPIDEMIA: Primary | ICD-10-CM

## 2022-02-28 DIAGNOSIS — Z12.11 SCREEN FOR COLON CANCER: ICD-10-CM

## 2022-02-28 DIAGNOSIS — I10 HYPERTENSION, UNSPECIFIED TYPE: ICD-10-CM

## 2022-02-28 DIAGNOSIS — Z12.31 VISIT FOR SCREENING MAMMOGRAM: ICD-10-CM

## 2022-02-28 DIAGNOSIS — Q87.2 KLIPPEL TRENAUNAY SYNDROME: ICD-10-CM

## 2022-02-28 PROCEDURE — 99214 OFFICE O/P EST MOD 30 MIN: CPT | Performed by: INTERNAL MEDICINE

## 2022-02-28 ASSESSMENT — PAIN SCALES - GENERAL: PAINLEVEL: NO PAIN (0)

## 2022-02-28 NOTE — PATIENT INSTRUCTIONS
Schedule Mammogram (see below)    Cholesterol in 3 months    See me thereafter.     Nice to meet you!    Please call the number below to schedule breast cancer screening:    [facility]  [address] [address] [phone] (Scheduling)    Raw vegetables, whole grains and foods high in Omega 3 Fatty acids can help impact your cholesterol in a positive way.  Reduction or cessation of alcohol can as well.

## 2022-03-18 ENCOUNTER — TRANSFERRED RECORDS (OUTPATIENT)
Dept: FAMILY MEDICINE | Facility: CLINIC | Age: 62
End: 2022-03-18

## 2022-03-18 PROCEDURE — 88305 TISSUE EXAM BY PATHOLOGIST: CPT | Performed by: PATHOLOGY

## 2022-03-21 ENCOUNTER — LAB REQUISITION (OUTPATIENT)
Dept: LAB | Facility: CLINIC | Age: 62
End: 2022-03-21

## 2022-03-21 ENCOUNTER — HOSPITAL ENCOUNTER (OUTPATIENT)
Dept: MAMMOGRAPHY | Facility: CLINIC | Age: 62
Discharge: HOME | End: 2022-03-21
Attending: INTERNAL MEDICINE | Admitting: INTERNAL MEDICINE
Payer: COMMERCIAL

## 2022-03-21 DIAGNOSIS — K57.30 DIVERTICULOSIS OF LARGE INTESTINE WITHOUT PERFORATION OR ABSCESS WITHOUT BLEEDING: ICD-10-CM

## 2022-03-21 DIAGNOSIS — K62.1 RECTAL POLYP: ICD-10-CM

## 2022-03-21 DIAGNOSIS — K63.5 POLYP OF COLON: ICD-10-CM

## 2022-03-21 DIAGNOSIS — D50.9 IRON DEFICIENCY ANEMIA, UNSPECIFIED: ICD-10-CM

## 2022-03-21 DIAGNOSIS — Z12.31 VISIT FOR SCREENING MAMMOGRAM: ICD-10-CM

## 2022-03-21 PROCEDURE — 77067 SCR MAMMO BI INCL CAD: CPT

## 2022-03-22 LAB
PATH REPORT.COMMENTS IMP SPEC: NORMAL
PATH REPORT.COMMENTS IMP SPEC: NORMAL
PATH REPORT.FINAL DX SPEC: NORMAL
PATH REPORT.GROSS SPEC: NORMAL
PATH REPORT.MICROSCOPIC SPEC OTHER STN: NORMAL
PATH REPORT.RELEVANT HX SPEC: NORMAL
PHOTO IMAGE: NORMAL

## 2022-05-24 ENCOUNTER — LAB (OUTPATIENT)
Dept: LAB | Facility: CLINIC | Age: 62
End: 2022-05-24
Payer: COMMERCIAL

## 2022-05-24 DIAGNOSIS — E78.5 DYSLIPIDEMIA: ICD-10-CM

## 2022-05-24 LAB
CHOLEST SERPL-MCNC: 222 MG/DL
FASTING STATUS PATIENT QL REPORTED: YES
HDLC SERPL-MCNC: 80 MG/DL
LDLC SERPL CALC-MCNC: 132 MG/DL
NONHDLC SERPL-MCNC: 142 MG/DL
TRIGL SERPL-MCNC: 51 MG/DL

## 2022-05-24 PROCEDURE — 80061 LIPID PANEL: CPT

## 2022-05-24 PROCEDURE — 36415 COLL VENOUS BLD VENIPUNCTURE: CPT

## 2022-05-27 ENCOUNTER — OFFICE VISIT (OUTPATIENT)
Dept: FAMILY MEDICINE | Facility: CLINIC | Age: 62
End: 2022-05-27
Payer: COMMERCIAL

## 2022-05-27 VITALS
WEIGHT: 184.8 LBS | SYSTOLIC BLOOD PRESSURE: 156 MMHG | HEART RATE: 77 BPM | BODY MASS INDEX: 27.29 KG/M2 | DIASTOLIC BLOOD PRESSURE: 82 MMHG | TEMPERATURE: 96.8 F | RESPIRATION RATE: 16 BRPM | OXYGEN SATURATION: 100 %

## 2022-05-27 DIAGNOSIS — I10 PRIMARY HYPERTENSION: ICD-10-CM

## 2022-05-27 DIAGNOSIS — Z23 HIGH PRIORITY FOR 2019-NCOV VACCINE: ICD-10-CM

## 2022-05-27 DIAGNOSIS — E78.5 DYSLIPIDEMIA: Primary | ICD-10-CM

## 2022-05-27 PROCEDURE — 0054A COVID-19,PF,PFIZER (12+ YRS): CPT | Performed by: INTERNAL MEDICINE

## 2022-05-27 PROCEDURE — 99214 OFFICE O/P EST MOD 30 MIN: CPT | Mod: 25 | Performed by: INTERNAL MEDICINE

## 2022-05-27 PROCEDURE — 91305 COVID-19,PF,PFIZER (12+ YRS): CPT | Performed by: INTERNAL MEDICINE

## 2022-05-27 RX ORDER — HYDROCHLOROTHIAZIDE 12.5 MG/1
12.5 TABLET ORAL DAILY
Qty: 60 TABLET | Refills: 3 | Status: CANCELLED | OUTPATIENT
Start: 2022-05-27

## 2022-05-27 RX ORDER — TELMISARTAN 40 MG/1
40 TABLET ORAL DAILY
Qty: 90 TABLET | Refills: 1 | Status: SHIPPED | OUTPATIENT
Start: 2022-05-27 | End: 2022-11-11

## 2022-05-27 ASSESSMENT — PAIN SCALES - GENERAL: PAINLEVEL: MILD PAIN (3)

## 2022-05-27 NOTE — PROGRESS NOTES
"  Assessment & Plan     Primary hypertension  discontinue hydrochlorothiazide.  Trial Micardis.  Risks and SE discussed.  Report elevated BP readings. Otherwise RTC 6M    Dyslipidemia  Labs reviewed.  Stay the course.           BMI:   Estimated body mass index is 27.29 kg/m  as calculated from the following:    Height as of 2/28/22: 1.753 m (5' 9\").    Weight as of this encounter: 83.8 kg (184 lb 12.8 oz).           No follow-ups on file.    Antonio Mccauley MD  [facility] [place] is a 61 year old who presents for the following health issues     HTN:  Elevated.  Was high last visit also. No HA/CP/Palpitations  Recently found home BP cuff. Hasn't started using.      HL:  By labs. Not on statin  The 10-year ASCVD risk score (Chay KELLEY Jr., et al., 2013) is: 4.6%    Values used to calculate the score:      Age: 61 years      Sex: Female      Is Non- : No      Diabetic: No      Tobacco smoker: No      Systolic Blood Pressure: 156 mmHg      Is BP treated: No      HDL Cholesterol: 80 mg/dL      Total Cholesterol: 222 mg/dL          History of Present Illness       Hypertension: She presents for follow up of hypertension.  She does not check blood pressure  regularly outside of the clinic. Outside blood pressures have been over 140/90. She follows a low salt diet.                 Review of Systems         Objective    BP (!) 156/88   Pulse 77   Temp 96.8  F (36  C) (Temporal)   Resp 16   Wt 83.8 kg (184 lb 12.8 oz)   LMP 08/05/2012   SpO2 100%   Breastfeeding No   BMI 27.29 kg/m    Body mass index is 27.29 kg/m .  Physical Exam   GENERAL: healthy, alert and no distress  CV: regular rate and rhythm, normal S1 S2, no S3 or S4, no murmur, click or rub, no peripheral edema and peripheral pulses strong  ABDOMEN: soft, nontender, no hepatosplenomegaly, no masses and bowel sounds normal  MS: no gross musculoskeletal defects noted, no edema                "

## 2022-05-27 NOTE — PATIENT INSTRUCTIONS
Covid booster today    STOP hydrochlorothiazide  START TELMISARTAN one 40mg pill once a day    Let me know if the top number is persistently over 150 at home!

## 2022-07-01 NOTE — PROGRESS NOTES
"  Assessment & Plan     Conductive hearing loss, bilateral  Patient has  been using OTC softening drops prior to appointment  Cerumen removed from BOTH canal (s) with combination lavage and curettage.  Patient tolerated the procedure well.  Post procedure Tympanic membranes are visualized and clear.  Return precautions discussed.        Bilateral impacted cerumen  As above. Preventive care discussed.           BMI:   Estimated body mass index is 27.62 kg/m  as calculated from the following:    Height as of this encounter: 1.753 m (5' 9\").    Weight as of this encounter: 84.8 kg (187 lb).           No follow-ups on file.    Antonio Mccauley MD  [facility] [place] is a 61 year old, presenting for the following health issues:  Hearing Loss      History of Present Illness       Reason for visit:  Plugged up ear  Symptom onset:  3-7 days ago  Symptoms include:  Loss of hearing in right ear  Symptom intensity:  Moderate  Symptom progression:  Staying the same  Had these symptoms before:  No  What makes it worse:  No  What makes it better:  No    She eats 0-1 servings of fruits and vegetables daily.She consumes 0 sweetened beverage(s) daily.She exercises with enough effort to increase her heart rate 9 or less minutes per day.  She exercises with enough effort to increase her heart rate 3 or less days per week.   She is taking medications regularly.             Review of Systems         Objective    /65 (BP Location: Right arm, Patient Position: Chair, Cuff Size: Adult Large)   Pulse 81   Temp 98.7  F (37.1  C)   Resp 16   Ht 1.753 m (5' 9\")   Wt 84.8 kg (187 lb)   LMP 08/05/2012   SpO2 100%   BMI 27.62 kg/m    Body mass index is 27.62 kg/m .  Physical Exam   GEN NAD  HEENT:  B ear canal cerumen impaction  Psych; pleasant and cooperative.                      .  ..  "

## 2022-07-05 ENCOUNTER — OFFICE VISIT (OUTPATIENT)
Dept: FAMILY MEDICINE | Facility: CLINIC | Age: 62
End: 2022-07-05
Payer: COMMERCIAL

## 2022-07-05 VITALS
RESPIRATION RATE: 16 BRPM | HEART RATE: 81 BPM | SYSTOLIC BLOOD PRESSURE: 134 MMHG | OXYGEN SATURATION: 100 % | DIASTOLIC BLOOD PRESSURE: 65 MMHG | WEIGHT: 187 LBS | BODY MASS INDEX: 27.7 KG/M2 | TEMPERATURE: 98.7 F | HEIGHT: 69 IN

## 2022-07-05 DIAGNOSIS — H61.23 BILATERAL IMPACTED CERUMEN: ICD-10-CM

## 2022-07-05 DIAGNOSIS — H90.0 CONDUCTIVE HEARING LOSS, BILATERAL: Primary | ICD-10-CM

## 2022-07-05 PROCEDURE — 99213 OFFICE O/P EST LOW 20 MIN: CPT | Performed by: INTERNAL MEDICINE

## 2022-07-05 ASSESSMENT — PAIN SCALES - GENERAL: PAINLEVEL: NO PAIN (0)

## 2022-07-11 ENCOUNTER — TRANSFERRED RECORDS (OUTPATIENT)
Dept: FAMILY MEDICINE | Facility: CLINIC | Age: 62
End: 2022-07-11

## 2022-07-11 ENCOUNTER — TRANSFERRED RECORDS (OUTPATIENT)
Dept: HEALTH INFORMATION MANAGEMENT | Facility: CLINIC | Age: 62
End: 2022-07-11

## 2022-07-20 ENCOUNTER — OFFICE VISIT (OUTPATIENT)
Dept: FAMILY MEDICINE | Facility: CLINIC | Age: 62
End: 2022-07-20
Payer: COMMERCIAL

## 2022-07-20 VITALS
TEMPERATURE: 97.5 F | SYSTOLIC BLOOD PRESSURE: 176 MMHG | OXYGEN SATURATION: 100 % | WEIGHT: 189.5 LBS | HEIGHT: 69 IN | BODY MASS INDEX: 28.07 KG/M2 | DIASTOLIC BLOOD PRESSURE: 82 MMHG | HEART RATE: 76 BPM | RESPIRATION RATE: 16 BRPM

## 2022-07-20 DIAGNOSIS — Z01.818 PREOP GENERAL PHYSICAL EXAM: Primary | ICD-10-CM

## 2022-07-20 LAB
BASOPHILS # BLD AUTO: 0 10E3/UL (ref 0–0.2)
BASOPHILS NFR BLD AUTO: 0 %
EOSINOPHIL # BLD AUTO: 0.1 10E3/UL (ref 0–0.7)
EOSINOPHIL NFR BLD AUTO: 3 %
ERYTHROCYTE [DISTWIDTH] IN BLOOD BY AUTOMATED COUNT: 20.1 % (ref 10–15)
HCT VFR BLD AUTO: 32.5 % (ref 35–47)
HGB BLD-MCNC: 9.6 G/DL (ref 11.7–15.7)
IMM GRANULOCYTES # BLD: 0 10E3/UL
IMM GRANULOCYTES NFR BLD: 0 %
LYMPHOCYTES # BLD AUTO: 1.8 10E3/UL (ref 0.8–5.3)
LYMPHOCYTES NFR BLD AUTO: 36 %
MCH RBC QN AUTO: 22.6 PG (ref 26.5–33)
MCHC RBC AUTO-ENTMCNC: 29.5 G/DL (ref 31.5–36.5)
MCV RBC AUTO: 77 FL (ref 78–100)
MONOCYTES # BLD AUTO: 0.3 10E3/UL (ref 0–1.3)
MONOCYTES NFR BLD AUTO: 7 %
NEUTROPHILS # BLD AUTO: 2.7 10E3/UL (ref 1.6–8.3)
NEUTROPHILS NFR BLD AUTO: 55 %
PLATELET # BLD AUTO: 388 10E3/UL (ref 150–450)
RBC # BLD AUTO: 4.24 10E6/UL (ref 3.8–5.2)
WBC # BLD AUTO: 4.9 10E3/UL (ref 4–11)

## 2022-07-20 PROCEDURE — 36415 COLL VENOUS BLD VENIPUNCTURE: CPT | Performed by: INTERNAL MEDICINE

## 2022-07-20 PROCEDURE — 99214 OFFICE O/P EST MOD 30 MIN: CPT | Performed by: INTERNAL MEDICINE

## 2022-07-20 PROCEDURE — 85025 COMPLETE CBC W/AUTO DIFF WBC: CPT | Performed by: INTERNAL MEDICINE

## 2022-07-20 ASSESSMENT — PAIN SCALES - GENERAL: PAINLEVEL: NO PAIN (0)

## 2022-07-20 NOTE — PROGRESS NOTES
[address], [address]  [facility] [address]  Phone: [phone]  Primary Provider: Alber Gomes  Pre-op Performing Provider: ALBER GOMES      PREOPERATIVE EVALUATION:  Today's date: 7/20/2022    Vandana Gonzalez is a 61 year old female who presents for a preoperative evaluation.    Surgical Information:  Surgery/Procedure: Eye Surgery   Surgery Location: [facility]   Surgeon: Dr. Jeannine Miramontes   Surgery Date: 8/5/2022  Time of Surgery: 1:45 pm  Where patient plans to recover: At home with family  Fax number for surgical facility: [phone]    Type of Anesthesia Anticipated: to be determined    Assessment & Plan     The proposed surgical procedure is considered LOW risk.    Preop general physical exam  Low risk.  Prior anemia thought to be due to slow bleeding ulcer.  Will check CBC.  If ok can proceed.    - CBC with platelets and differential; Future         Risks and Recommendations:  The patient has the following additional risks and recommendations for perioperative complications:   - No identified additional risk factors other than previously addressed    Medication Instructions:  Patient is to take all scheduled medications on the day of surgery    RECOMMENDATION:  APPROVAL GIVEN to proceed with proposed procedure, without further diagnostic evaluation.        Subjective     HPI related to upcoming procedure:   Planning 'lid lift' blepharoplasty.  Under conscious sedation only.    No prior anesthesia intolerance  No known heart disease    Home activities include weeding, house work.  4+ METS    Preop Questions 7/17/2022   1. Have you ever had a heart attack or stroke? No   2. Have you ever had surgery on your heart or blood vessels, such as a stent placement, a coronary artery bypass, or surgery on an artery in your head, neck, heart, or legs? No   3. Do you have chest pain with activity? No   4. Do you have a history of  heart failure? No  "  5. Do you currently have a cold, bronchitis or symptoms of other infection? No   6. Do you have a cough, shortness of breath, or wheezing? No   7. Do you or anyone in your family have previous history of blood clots? No   8. Do you or does anyone in your family have a serious bleeding problem such as prolonged bleeding following surgeries or cuts? No   9. Have you ever had problems with anemia or been told to take iron pills? YES - CBC today   10. Have you had any abnormal blood loss such as black, tarry or bloody stools, or abnormal vaginal bleeding? No   11. Have you ever had a blood transfusion? YES - 10/2021   11a. Have you ever had a transfusion reaction? No   12. Are you willing to have a blood transfusion if it is medically needed before, during, or after your surgery? Yes   13. Have you or any of your relatives ever had problems with anesthesia? No   14. Do you have sleep apnea, excessive snoring or daytime drowsiness? No   15. Do you have any artifical heart valves or other implanted medical devices like a pacemaker, defibrillator, or continuous glucose monitor? No   16. Do you have artificial joints? YES - hip   17. Are you allergic to latex? No   18. Is there any chance that you may be pregnant? -       Preoperative Review of :   reviewed - no current opioids          Review of Systems  CONSTITUTIONAL: NEGATIVE for fever, chills, change in weight  ENT/MOUTH: NEGATIVE for ear, mouth and throat problems  RESP: NEGATIVE for significant cough or SOB  CV: NEGATIVE for chest pain, palpitations or peripheral edema    Patient Active Problem List    Diagnosis Date Noted     Hip fracture, right, closed, initial encounter (H) 10/27/2021     Priority: Medium     Anemia, unspecified type 10/07/2021     Priority: Medium     Klippel Trenaunay syndrome 10/06/2021     Priority: Medium     \"syndrome with capillary and venous malformations as well as limb overgrowth with or without lymphatic malformation\".  Patient " DOES have lymphedema, pain and ulcerations mostly localized to the LLE.  She sees specialists as Mehdi.         Preoperative examination 10/06/2021     Priority: Medium     Mechanical ptosis of bilateral eyelids 10/06/2021     Priority: Medium     Ulcer of foot (H) 08/22/2014     Priority: Medium     CARDIOVASCULAR SCREENING; LDL GOAL LESS THAN 160 10/31/2010     Priority: Medium     Hypertensive disorder 10/29/2009     Priority: Medium     (Problem list name updated by automated process. 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, unspecified site     large angioma adomen     Hypertensive disorder      Klippel Trenaunay disease 2002    AVM left leg     Phlebitis and thrombophlebitis of other deep vessels of lower extremities 6-2002    AVM left leg     Stasis ulcer of lower extremity (H) 2012    Left-recurrent     Past Surgical History:   Procedure Laterality Date     ARTHROPLASTY HIP ANTERIOR Right 10/28/2021    Procedure: ARTHROPLASTY, RIGHT TOTAL HIP, DIRECT ANTERIOR APPROACH;  Surgeon: Jimmy Pena MD;  Location:  OR     BIOPSY OF BREAST, INCISIONAL  7-09    phyllodes tumor left     BREAST SURGERY  7-2009     ESOPHAGOSCOPY, GASTROSCOPY, DUODENOSCOPY (EGD), COMBINED N/A 10/7/2021    Procedure: ESOPHAGOGASTRODUODENOSCOPY (EGD);  Surgeon: Romeo Chavez MD;  Location:  GI     FEMUR SURGERY      2002 left side      ORTHOPEDIC SURGERY       "repair broken femur     ZZC LIGATN FEMORAL VEIN      multiple vein strippings left     ZZHC UGI ENDOSCOPY, SIMPLE EXAM      esophageal stricture     Current Outpatient Medications   Medication Sig Dispense Refill     pantoprazole (PROTONIX) 40 MG EC tablet Take 1 tablet (40 mg) by mouth 2 times daily 60 tablet 0     sucralfate (CARAFATE) 1 GM tablet Take 1 g by mouth 4 times daily       telmisartan (MICARDIS) 40 MG tablet Take 1 tablet (40 mg) by mouth daily 90 tablet 1       Allergies   Allergen Reactions     No Known Allergies         Social History     Tobacco Use     Smoking status: Former Smoker     Packs/day: 0.25     Years: 2.00     Pack years: 0.50     Types: Cigarettes     Start date: 10/1/1990     Quit date: 10/1/1992     Years since quittin.8     Smokeless tobacco: Never Used     Tobacco comment: Quit    Substance Use Topics     Alcohol use: Yes     Alcohol/week: 0.0 standard drinks     Comment: couple beers per day       History   Drug Use No         Objective     BP (!) 176/82 (BP Location: Left arm, Patient Position: Sitting, Cuff Size: Adult Regular)   Pulse 76   Temp 97.5  F (36.4  C) (Temporal)   Resp 16   Ht 1.753 m (5' 9\")   Wt 86 kg (189 lb 8 oz)   LMP 2012   SpO2 100%   Breastfeeding No   BMI 27.98 kg/m      Physical Exam  GENERAL APPEARANCE: healthy, alert and no distress  HENT: ear canals and TM's normal and nose and mouth without ulcers or lesions  RESP: lungs clear to auscultation - no rales, rhonchi or wheezes  CV: regular rate and rhythm, normal S1 S2, no S3 or S4 and no murmur, click or rub   ABDOMEN: soft, nontender, no HSM or masses and bowel sounds normal  NEURO: Normal strength and tone, sensory exam grossly normal, mentation intact and speech normal    Recent Labs   Lab Test 22  1112 22  1507 10/30/21  0639 10/29/21  1919 10/29/21  0758 10/28/21  1233 10/28/21  0750 10/07/21  1534 10/07/21  0554   HGB  --  10.1* 8.0*   < > 7.8*   < > 9.0*   < " > 6.1*   PLT  --  391  --   --  324  --  343   < > 326   INR  --   --   --   --   --   --   --   --  1.12     --   --   --   --   --  136   < > 140   POTASSIUM 3.9  --  3.5  --   --   --  3.6   < > 3.4   CR 0.46*  --   --   --   --   --  0.39*   < > 0.52    < > = values in this interval not displayed.        Diagnostics:  Labs pending at this time.  Results will be reviewed when available.   No EKG required for low risk surgery (cataract, skin procedure, breast biopsy, etc).    Revised Cardiac Risk Index (RCRI):  The patient has the following serious cardiovascular risks for perioperative complications:   - No serious cardiac risks = 0 points     RCRI Interpretation: 0 points: Class I (very low risk - 0.4% complication rate)           Signed Electronically by: Antonio Mccauley MD  Copy of this evaluation report is provided to requesting physician.

## 2022-08-05 PROCEDURE — 88305 TISSUE EXAM BY PATHOLOGIST: CPT | Mod: 26 | Performed by: PATHOLOGY

## 2022-08-05 PROCEDURE — 88305 TISSUE EXAM BY PATHOLOGIST: CPT | Mod: TC,ORL | Performed by: OPHTHALMOLOGY

## 2022-08-08 ENCOUNTER — LAB REQUISITION (OUTPATIENT)
Dept: LAB | Facility: CLINIC | Age: 62
End: 2022-08-08
Payer: COMMERCIAL

## 2022-09-23 ENCOUNTER — VIRTUAL VISIT (OUTPATIENT)
Dept: FAMILY MEDICINE | Facility: CLINIC | Age: 62
End: 2022-09-23
Payer: COMMERCIAL

## 2022-09-23 DIAGNOSIS — D50.9 MICROCYTIC ANEMIA: Primary | ICD-10-CM

## 2022-09-23 PROCEDURE — 99213 OFFICE O/P EST LOW 20 MIN: CPT | Mod: 95 | Performed by: INTERNAL MEDICINE

## 2022-09-23 NOTE — PATIENT INSTRUCTIONS
SCHEDULE a blood test for next week. No need to fast.    Agree with seeing Franklin on the Klippel Trenaunay syndrome

## 2022-09-23 NOTE — PROGRESS NOTES
"Vandana is a 62 year old who is being evaluated via a billable video visit.      How would you like to obtain your AVS? MyChart  If the video visit is dropped, the invitation should be resent by: Send to e-mail at: [email or web address]  Will anyone else be joining your video visit? No        Assessment & Plan     Microcytic anemia    She has had GI eval.  No mention of ulcer on her last 2 EGD (reviewed care everywhere).  She is on PPI and carafate.    We will check labs as ordered.      Review of prior external note(s) from - [facility] from [facility]         BMI:   Estimated body mass index is 27.98 kg/m  as calculated from the following:    Height as of 7/20/22: 1.753 m (5' 9\").    Weight as of 7/20/22: 86 kg (189 lb 8 oz).           No follow-ups on file.    Antonio Mccauley MD  [facility]    Subjective   Vandana is a 62 year old, presenting for the following health issues:  Discuss Hemoglobin Levels     Pt with anemia.  H/o similar.  Last hgb reviewed.  She had a suspected upper gI ulcer in 2021.  Has had EGD and C scopes with [facility].      She denies gerd, hematemesis, abdominal pain, BRBPR, melena.      History of Present Illness       Reason for visit:  Discussion about hemoglobin levels    She eats 0-1 servings of fruits and vegetables daily.She consumes 1 sweetened beverage(s) daily.She exercises with enough effort to increase her heart rate 9 or less minutes per day.  She exercises with enough effort to increase her heart rate 3 or less days per week.   She is taking medications regularly.             Review of Systems   As above.        Objective    Vitals - Patient Reported  Pain Score: No Pain (0)      Vitals:  No vitals were obtained today due to virtual visit.    Physical Exam   GENERAL: Healthy, alert and no distress  EYES: Eyes grossly normal to inspection.  No discharge or erythema, or obvious scleral/conjunctival abnormalities.  RESP: No audible wheeze, cough, or visible cyanosis.  " No visible retractions or increased work of breathing.    SKIN: Visible skin clear. No significant rash, abnormal pigmentation or lesions.  NEURO: Cranial nerves grossly intact.  Mentation and speech appropriate for age.  PSYCH: Mentation appears normal, affect normal/bright, judgement and insight intact, normal speech and appearance well-groomed.                Video-Visit Details    Video Start Time: 4:28 PM    Type of service:  Video Visit    Video End Time:4:34 PM    Originating Location (pt. Location): Home    Distant Location (provider location):  [facility]     Platform used for Video Visit: [employer]

## 2022-09-27 ENCOUNTER — LAB (OUTPATIENT)
Dept: LAB | Facility: CLINIC | Age: 62
End: 2022-09-27
Payer: COMMERCIAL

## 2022-09-27 DIAGNOSIS — D50.9 MICROCYTIC ANEMIA: ICD-10-CM

## 2022-09-27 LAB
BASOPHILS # BLD AUTO: 0 10E3/UL (ref 0–0.2)
BASOPHILS NFR BLD AUTO: 1 %
EOSINOPHIL # BLD AUTO: 0.2 10E3/UL (ref 0–0.7)
EOSINOPHIL NFR BLD AUTO: 4 %
ERYTHROCYTE [DISTWIDTH] IN BLOOD BY AUTOMATED COUNT: 18.9 % (ref 10–15)
HCT VFR BLD AUTO: 32.9 % (ref 35–47)
HGB BLD-MCNC: 9.7 G/DL (ref 11.7–15.7)
IMM GRANULOCYTES # BLD: 0 10E3/UL
IMM GRANULOCYTES NFR BLD: 0 %
IRON SATN MFR SERPL: 5 % (ref 15–46)
IRON SERPL-MCNC: 22 UG/DL (ref 35–180)
LYMPHOCYTES # BLD AUTO: 1.8 10E3/UL (ref 0.8–5.3)
LYMPHOCYTES NFR BLD AUTO: 38 %
MCH RBC QN AUTO: 22.9 PG (ref 26.5–33)
MCHC RBC AUTO-ENTMCNC: 29.5 G/DL (ref 31.5–36.5)
MCV RBC AUTO: 78 FL (ref 78–100)
MONOCYTES # BLD AUTO: 0.4 10E3/UL (ref 0–1.3)
MONOCYTES NFR BLD AUTO: 9 %
NEUTROPHILS # BLD AUTO: 2.4 10E3/UL (ref 1.6–8.3)
NEUTROPHILS NFR BLD AUTO: 49 %
PLATELET # BLD AUTO: 344 10E3/UL (ref 150–450)
RBC # BLD AUTO: 4.24 10E6/UL (ref 3.8–5.2)
TIBC SERPL-MCNC: 443 UG/DL (ref 240–430)
VIT B12 SERPL-MCNC: 185 PG/ML (ref 232–1245)
WBC # BLD AUTO: 4.9 10E3/UL (ref 4–11)

## 2022-09-27 PROCEDURE — 36415 COLL VENOUS BLD VENIPUNCTURE: CPT

## 2022-09-27 PROCEDURE — 82607 VITAMIN B-12: CPT

## 2022-09-27 PROCEDURE — 82728 ASSAY OF FERRITIN: CPT

## 2022-09-27 PROCEDURE — 83550 IRON BINDING TEST: CPT

## 2022-09-27 PROCEDURE — 85025 COMPLETE CBC W/AUTO DIFF WBC: CPT

## 2022-09-28 ENCOUNTER — TELEPHONE (OUTPATIENT)
Dept: FAMILY MEDICINE | Facility: CLINIC | Age: 62
End: 2022-09-28

## 2022-09-28 LAB — FERRITIN SERPL-MCNC: 5 NG/ML (ref 8–252)

## 2022-09-28 NOTE — TELEPHONE ENCOUNTER
Please call patient.  Based on labs I'd like her to:  1.  Start Ferrous Sulfate 325mg once a day (watch for constipation and dark stools)  2.  Start B12 supplement 1000mcg a day  3.  See me (virtual is fine) in 3 months.  Thank you  Antonio Mccauley MD on 9/28/2022 at 7:27 AM

## 2022-10-10 ENCOUNTER — MYC MEDICAL ADVICE (OUTPATIENT)
Dept: FAMILY MEDICINE | Facility: CLINIC | Age: 62
End: 2022-10-10

## 2022-10-10 DIAGNOSIS — R60.0 EDEMA OF LOWER EXTREMITY: ICD-10-CM

## 2022-10-10 DIAGNOSIS — Q87.2 KLIPPEL TRENAUNAY SYNDROME: Primary | ICD-10-CM

## 2022-10-11 NOTE — TELEPHONE ENCOUNTER
See MyChart from patient needing provider review.  Please write back direct to pt or advise if clinic follow up needed.    Lashawn Trimble, HECTOR  [facility] RN Triage Team

## 2022-10-22 ENCOUNTER — HEALTH MAINTENANCE LETTER (OUTPATIENT)
Age: 62
End: 2022-10-22

## 2022-10-24 ENCOUNTER — MYC MEDICAL ADVICE (OUTPATIENT)
Dept: FAMILY MEDICINE | Facility: CLINIC | Age: 62
End: 2022-10-24

## 2022-10-25 NOTE — TELEPHONE ENCOUNTER
Referral faxed to [facility] at [phone], also [employer] message was sent to patient.    Shweta VELAZCO MA on 10/25/2022 at 10:45 AM

## 2022-11-11 ENCOUNTER — OFFICE VISIT (OUTPATIENT)
Dept: FAMILY MEDICINE | Facility: CLINIC | Age: 62
End: 2022-11-11
Payer: COMMERCIAL

## 2022-11-11 VITALS
WEIGHT: 198 LBS | HEART RATE: 68 BPM | HEIGHT: 69 IN | BODY MASS INDEX: 29.33 KG/M2 | DIASTOLIC BLOOD PRESSURE: 79 MMHG | SYSTOLIC BLOOD PRESSURE: 166 MMHG | OXYGEN SATURATION: 100 %

## 2022-11-11 DIAGNOSIS — I10 PRIMARY HYPERTENSION: ICD-10-CM

## 2022-11-11 DIAGNOSIS — Q87.2 KLIPPEL TRENAUNAY SYNDROME: Primary | ICD-10-CM

## 2022-11-11 DIAGNOSIS — Z23 HIGH PRIORITY FOR 2019-NCOV VACCINE: ICD-10-CM

## 2022-11-11 DIAGNOSIS — R60.0 EDEMA OF LOWER EXTREMITY: ICD-10-CM

## 2022-11-11 PROCEDURE — 91312 COVID-19,PF,PFIZER BOOSTER BIVALENT: CPT | Performed by: INTERNAL MEDICINE

## 2022-11-11 PROCEDURE — 0124A COVID-19,PF,PFIZER BOOSTER BIVALENT: CPT | Performed by: INTERNAL MEDICINE

## 2022-11-11 PROCEDURE — 99214 OFFICE O/P EST MOD 30 MIN: CPT | Performed by: INTERNAL MEDICINE

## 2022-11-11 RX ORDER — TELMISARTAN 80 MG/1
80 TABLET ORAL DAILY
Qty: 90 TABLET | Refills: 1 | Status: SHIPPED | OUTPATIENT
Start: 2022-11-11 | End: 2023-05-18

## 2022-11-11 ASSESSMENT — PAIN SCALES - GENERAL: PAINLEVEL: NO PAIN (0)

## 2022-11-11 NOTE — TELEPHONE ENCOUNTER
Referral made it's way to [facility]. Patient was seen by Vascular at [facility] in the past.  Referral to vascular was generated after confirming with patient.  Antonio Mccauley MD on 11/11/2022 at 1:48 PM

## 2022-11-11 NOTE — PROGRESS NOTES
"  Assessment & Plan     Klippel Trenaunay syndrome    Options are discussed.  The patient would like to return to [place] as she has gotten treatment for her vascular malformations in the past.  As she was seen last by the vascular clinic we will place a referral there.    If they continue to decline to see her she is open to establishing with local vascular  - Vascular Medicine Referral  - COMPRESSION STOCKINGS  Dispense: 2 each; Refill: 1    Edema of lower extremity  As above.  I did take the liberty of giving her a fresh prescription for compression stocking.  - telmisartan (MICARDIS) 80 MG tablet  Dispense: 90 tablet; Refill: 1  - Vascular Medicine Referral  - COMPRESSION STOCKINGS  Dispense: 2 each; Refill: 1    High priority for 2019-nCoV vaccine    - COVID-19,PF,PFIZER BOOSTER BIVALENT 12+Yrs    Primary hypertension  Elevated.  Increase telmisartan to 80 mg a day.  - telmisartan (MICARDIS) 80 MG tablet  Dispense: 90 tablet; Refill: 1               BMI:   Estimated body mass index is 29.24 kg/m  as calculated from the following:    Height as of this encounter: 1.753 m (5' 9\").    Weight as of this encounter: 89.8 kg (198 lb).           No follow-ups on file.    Antonio Mccauley MD  [facility] [place] Ross is a 62 year old, presenting for the following health issues:  Referral (PT - [facility]) and Imm/Inj (COVID-19 VACCINE)      HPI   Pt with peripheral edema and venous malformations due to underlying congenital disease.    The results she has gradually worsening left lower extremity edema with ulceration.  She is managing with Mepilex dressing and a 40 to 50 mmHg compression.    He was last seen at [place] [facility] in 2019.  He had requested a referral to [place] for lymphedema therapy.  This apparently goes through their PMNR program.  They have thus far declined to see her requesting multiple notes.    HTN:  BP is noted.  Elevated in the last clinic visit as well.  She notes relatively " "normal blood pressure readings at home.        Review of Systems         Objective    BP (!) 166/79 (BP Location: Left arm, Patient Position: Sitting, Cuff Size: Adult Large)   Pulse 68   Ht 1.753 m (5' 9\")   Wt 89.8 kg (198 lb)   LMP 08/05/2012   SpO2 100%   BMI 29.24 kg/m    Body mass index is 29.24 kg/m .  Physical Exam   GEN NAD  CV RRR  EXT marked LLE edema with superficial ulceration lower anterior shin.  No cellulitis.                      "

## 2022-11-22 ENCOUNTER — MYC MEDICAL ADVICE (OUTPATIENT)
Dept: FAMILY MEDICINE | Facility: CLINIC | Age: 62
End: 2022-11-22

## 2023-04-01 ENCOUNTER — HEALTH MAINTENANCE LETTER (OUTPATIENT)
Age: 63
End: 2023-04-01

## 2023-05-18 DIAGNOSIS — I10 PRIMARY HYPERTENSION: ICD-10-CM

## 2023-05-18 DIAGNOSIS — R60.0 EDEMA OF LOWER EXTREMITY: ICD-10-CM

## 2023-05-18 DIAGNOSIS — Q87.2 KLIPPEL TRENAUNAY SYNDROME: ICD-10-CM

## 2023-05-18 RX ORDER — TELMISARTAN 80 MG/1
TABLET ORAL
Qty: 90 TABLET | Refills: 1 | Status: SHIPPED | OUTPATIENT
Start: 2023-05-18 | End: 2023-11-27

## 2023-07-10 ASSESSMENT — ENCOUNTER SYMPTOMS
BREAST MASS: 0
ABDOMINAL PAIN: 0
PARESTHESIAS: 0
CHILLS: 0
HEMATOCHEZIA: 0
NAUSEA: 0
NERVOUS/ANXIOUS: 0
SHORTNESS OF BREATH: 0
FREQUENCY: 0
HEADACHES: 0
COUGH: 0
CONSTIPATION: 0
JOINT SWELLING: 0
SORE THROAT: 0
ARTHRALGIAS: 0
WEAKNESS: 0
FEVER: 0
DYSURIA: 0
PALPITATIONS: 0
HEARTBURN: 0
HEMATURIA: 0
DIZZINESS: 0
EYE PAIN: 0
MYALGIAS: 0
DIARRHEA: 0

## 2023-07-10 NOTE — PROGRESS NOTES
SUBJECTIVE:   CC: Vandana is an 62 year old who presents for preventive health visit.     Lives at home with  and dog.   recently Dx with stage 4 melanoma.  He's feeling well but of course unwelcome news.        Healthy Habits:     Getting at least 3 servings of Calcium per day:  Yes    Bi-annual eye exam:  Yes    Dental care twice a year:  Yes    Sleep apnea or symptoms of sleep apnea:  None    Diet:  Regular (no restrictions)    Frequency of exercise:  None    Taking medications regularly:  Yes    Barriers to taking medications:  None    Medication side effects:  None    Additional concerns today:  No      Today's PHQ-2 Score:       7/10/2023     1:22 PM   PHQ-2 (  [employer])   Q1: Little interest or pleasure in doing things 0   Q2: Feeling down, depressed or hopeless 0   PHQ-2 Score 0   Q1: Little interest or pleasure in doing things Not at all   Q2: Feeling down, depressed or hopeless Not at all   PHQ-2 Score 0       Have you ever done Advance Care Planning? (For example, a Health Directive, POLST, or a discussion with a medical provider or your loved ones about your wishes): Yes, patient states has an Advance Care Planning document and will bring a copy to the clinic.    Social History     Tobacco Use     Smoking status: Former     Packs/day: 0.25     Years: 2.00     Pack years: 0.50     Types: Cigarettes     Start date: 10/1/1990     Quit date: 10/1/1992     Years since quittin.7     Smokeless tobacco: Never     Tobacco comments:     Quit    Substance Use Topics     Alcohol use: Yes     Comment: couple beers per day             7/10/2023     1:21 PM   Alcohol Use   Prescreen: >3 drinks/day or >7 drinks/week? No     Reviewed orders with patient.  Reviewed health maintenance and updated orders accordingly - Yes      Breast Cancer Screening:    FHS-7:       3/21/2022    10:25 AM   Breast CA Risk Assessment (FHS-7)   Did any of your first-degree relatives have breast or ovarian cancer? No  "  Did any of your relatives have bilateral breast cancer? No   Did any man in your family have breast cancer? No   Did any woman in your family have breast and ovarian cancer? No   Did any woman in your family have breast cancer before age 50 y? No   Do you have 2 or more relatives with breast and/or ovarian cancer? No   Do you have 2 or more relatives with breast and/or bowel cancer? No         Pertinent mammograms are reviewed under the imaging tab.    History of abnormal Pap smear: NO - age 30-65 PAP every 5 years with negative HPV co-testing recommended     Reviewed and updated as needed this visit by clinical staff   Tobacco  Allergies  Meds              Reviewed and updated as needed this visit by Provider                     Review of Systems   Constitutional: Negative for chills and fever.   HENT: Negative for congestion, ear pain, hearing loss and sore throat.    Eyes: Negative for pain and visual disturbance.   Respiratory: Negative for cough and shortness of breath.    Cardiovascular: Negative for chest pain, palpitations and peripheral edema.   Gastrointestinal: Negative for abdominal pain, constipation, diarrhea, heartburn, hematochezia and nausea.   Breasts:  Negative for tenderness, breast mass and discharge.   Genitourinary: Negative for dysuria, frequency, genital sores, hematuria, pelvic pain, urgency, vaginal bleeding and vaginal discharge.   Musculoskeletal: Negative for arthralgias, joint swelling and myalgias.   Skin: Negative for rash.   Neurological: Negative for dizziness, weakness, headaches and paresthesias.   Psychiatric/Behavioral: Negative for mood changes. The patient is not nervous/anxious.           OBJECTIVE:   /75 (BP Location: Right arm, Patient Position: Chair, Cuff Size: Adult Regular)   Pulse 84   Temp 96.9  F (36.1  C) (Temporal)   Resp 16   Ht 1.746 m (5' 8.75\")   Wt 91.6 kg (202 lb)   LMP 08/05/2012   SpO2 99%   BMI 30.05 kg/m    Physical Exam  GENERAL: " "healthy, alert and no distress  EYES: Eyes grossly normal to inspection, PERRL and conjunctivae and sclerae normal  HENT: ear canals and TM's normal, nose and mouth without ulcers or lesions  NECK: no adenopathy, no asymmetry, masses, or scars and thyroid normal to palpation  RESP: lungs clear to auscultation - no rales, rhonchi or wheezes  CV: regular rate and rhythm, normal S1 S2, no S3 or S4, no murmur, click or rub, no peripheral edema and peripheral pulses strong  ABDOMEN: soft, nontender, no hepatosplenomegaly, no masses and bowel sounds normal   (female): normal female external genitalia, normal urethral meatus, vaginal mucosa pink, moist, well rugated, and normal cervix/adnexa/uterus without masses or discharge  MS: no gross musculoskeletal defects noted, no edema  SKIN: no suspicious lesions or rashes  NEURO: Normal strength and tone, mentation intact and speech normal  PSYCH: mentation appears normal, affect normal/bright        ASSESSMENT/PLAN:       ICD-10-CM    1. Encounter for preventive care  Z00.00       2. Cervical cancer screening  Z12.4 Pap Screen with HPV - recommended age 30 - 65 years        Age and gender appropriate preventive care and screenings are discussed.  Particular attention to personal preventive care and age appropriate lifestyle including the incorporation of healthy diet and physical activity is made.            COUNSELING:  Reviewed preventive health counseling, as reflected in patient instructions      BMI:   Estimated body mass index is 30.05 kg/m  as calculated from the following:    Height as of this encounter: 1.746 m (5' 8.75\").    Weight as of this encounter: 91.6 kg (202 lb).         She reports that she quit smoking about 30 years ago. Her smoking use included cigarettes. She started smoking about 32 years ago. She has a 0.50 pack-year smoking history. She has never used smokeless tobacco.      Antonio Mccauley MD  [facility]  "

## 2023-07-11 ENCOUNTER — OFFICE VISIT (OUTPATIENT)
Dept: FAMILY MEDICINE | Facility: CLINIC | Age: 63
End: 2023-07-11
Payer: COMMERCIAL

## 2023-07-11 VITALS
WEIGHT: 202 LBS | DIASTOLIC BLOOD PRESSURE: 75 MMHG | RESPIRATION RATE: 16 BRPM | BODY MASS INDEX: 29.92 KG/M2 | HEIGHT: 69 IN | SYSTOLIC BLOOD PRESSURE: 129 MMHG | HEART RATE: 84 BPM | TEMPERATURE: 96.9 F | OXYGEN SATURATION: 99 %

## 2023-07-11 DIAGNOSIS — Z00.00 ENCOUNTER FOR PREVENTIVE CARE: Primary | ICD-10-CM

## 2023-07-11 DIAGNOSIS — Z12.4 CERVICAL CANCER SCREENING: ICD-10-CM

## 2023-07-11 LAB
ALBUMIN SERPL BCG-MCNC: 4.4 G/DL (ref 3.5–5.2)
ALP SERPL-CCNC: 63 U/L (ref 35–104)
ALT SERPL W P-5'-P-CCNC: 19 U/L (ref 0–50)
ANION GAP SERPL CALCULATED.3IONS-SCNC: 11 MMOL/L (ref 7–15)
AST SERPL W P-5'-P-CCNC: 24 U/L (ref 0–45)
BASOPHILS # BLD AUTO: 0 10E3/UL (ref 0–0.2)
BASOPHILS NFR BLD AUTO: 1 %
BILIRUB SERPL-MCNC: 0.4 MG/DL
BUN SERPL-MCNC: 13.8 MG/DL (ref 8–23)
CALCIUM SERPL-MCNC: 10.1 MG/DL (ref 8.8–10.2)
CHLORIDE SERPL-SCNC: 99 MMOL/L (ref 98–107)
CHOLEST SERPL-MCNC: 270 MG/DL
CREAT SERPL-MCNC: 0.5 MG/DL (ref 0.51–0.95)
DEPRECATED HCO3 PLAS-SCNC: 26 MMOL/L (ref 22–29)
EOSINOPHIL # BLD AUTO: 0.3 10E3/UL (ref 0–0.7)
EOSINOPHIL NFR BLD AUTO: 5 %
ERYTHROCYTE [DISTWIDTH] IN BLOOD BY AUTOMATED COUNT: 15.8 % (ref 10–15)
GFR SERPL CREATININE-BSD FRML MDRD: >90 ML/MIN/1.73M2
GLUCOSE SERPL-MCNC: 110 MG/DL (ref 70–99)
HCT VFR BLD AUTO: 39.3 % (ref 35–47)
HDLC SERPL-MCNC: 69 MG/DL
HGB BLD-MCNC: 12.3 G/DL (ref 11.7–15.7)
IMM GRANULOCYTES # BLD: 0 10E3/UL
IMM GRANULOCYTES NFR BLD: 0 %
LDLC SERPL CALC-MCNC: 185 MG/DL
LYMPHOCYTES # BLD AUTO: 1.9 10E3/UL (ref 0.8–5.3)
LYMPHOCYTES NFR BLD AUTO: 33 %
MCH RBC QN AUTO: 29.4 PG (ref 26.5–33)
MCHC RBC AUTO-ENTMCNC: 31.3 G/DL (ref 31.5–36.5)
MCV RBC AUTO: 94 FL (ref 78–100)
MONOCYTES # BLD AUTO: 0.3 10E3/UL (ref 0–1.3)
MONOCYTES NFR BLD AUTO: 6 %
NEUTROPHILS # BLD AUTO: 3.1 10E3/UL (ref 1.6–8.3)
NEUTROPHILS NFR BLD AUTO: 55 %
NONHDLC SERPL-MCNC: 201 MG/DL
PLATELET # BLD AUTO: 284 10E3/UL (ref 150–450)
POTASSIUM SERPL-SCNC: 4.5 MMOL/L (ref 3.4–5.3)
PROT SERPL-MCNC: 7.6 G/DL (ref 6.4–8.3)
RBC # BLD AUTO: 4.19 10E6/UL (ref 3.8–5.2)
SODIUM SERPL-SCNC: 136 MMOL/L (ref 136–145)
TRIGL SERPL-MCNC: 82 MG/DL
TSH SERPL DL<=0.005 MIU/L-ACNC: 3.8 UIU/ML (ref 0.3–4.2)
WBC # BLD AUTO: 5.5 10E3/UL (ref 4–11)

## 2023-07-11 PROCEDURE — 85025 COMPLETE CBC W/AUTO DIFF WBC: CPT | Performed by: INTERNAL MEDICINE

## 2023-07-11 PROCEDURE — 80061 LIPID PANEL: CPT | Performed by: INTERNAL MEDICINE

## 2023-07-11 PROCEDURE — 99396 PREV VISIT EST AGE 40-64: CPT | Mod: 25 | Performed by: INTERNAL MEDICINE

## 2023-07-11 PROCEDURE — 90715 TDAP VACCINE 7 YRS/> IM: CPT | Performed by: INTERNAL MEDICINE

## 2023-07-11 PROCEDURE — 84443 ASSAY THYROID STIM HORMONE: CPT | Performed by: INTERNAL MEDICINE

## 2023-07-11 PROCEDURE — G0123 SCREEN CERV/VAG THIN LAYER: HCPCS | Performed by: INTERNAL MEDICINE

## 2023-07-11 PROCEDURE — 90471 IMMUNIZATION ADMIN: CPT | Performed by: INTERNAL MEDICINE

## 2023-07-11 PROCEDURE — 36415 COLL VENOUS BLD VENIPUNCTURE: CPT | Performed by: INTERNAL MEDICINE

## 2023-07-11 PROCEDURE — 87624 HPV HI-RISK TYP POOLED RSLT: CPT | Performed by: INTERNAL MEDICINE

## 2023-07-11 PROCEDURE — 80053 COMPREHEN METABOLIC PANEL: CPT | Performed by: INTERNAL MEDICINE

## 2023-07-11 ASSESSMENT — PAIN SCALES - GENERAL: PAINLEVEL: NO PAIN (0)

## 2023-07-11 NOTE — PATIENT INSTRUCTIONS
Tdap (tetanus plus whooping cough) vaccine today    LABS please      Shingrix :  1) Recommended for healthy adults aged 50 years and older to help prevent shingles and its related complications such as pain related to the shingles virus  2) Recommended for adults who previously received the previous shingles vaccine (Zostavax )  3) The preferred vaccine for helping to prevent shingles and its related complications  4) It is a series of TWO vaccines with the second dose administered between 2-6 months after the first dose in this series  5) PLEASE CHECK COST WITH YOUR INSURANCE COMPANY OR YOUR LOCAL PHARMACY AS EACH DOSE MAY BE AS MUCH AS APPROXIMATELY $300 IF NOT COVERED BY INSURANCE

## 2023-07-11 NOTE — NURSING NOTE
Prior to immunization administration, verified patients identity using patient s name and date of birth. Please see Immunization Activity for additional information.     Screening Questionnaire for Adult Immunization    Are you sick today?   No   Do you have allergies to medications, food, a vaccine component or latex?   No   Have you ever had a serious reaction after receiving a vaccination?   No   Do you have a long-term health problem with heart, lung, kidney, or metabolic disease (e.g., diabetes), asthma, a blood disorder, no spleen, complement component deficiency, a cochlear implant, or a spinal fluid leak?  Are you on long-term aspirin therapy?   Yes   Do you have cancer, leukemia, HIV/AIDS, or any other immune system problem?   No   Do you have a parent, brother, or sister with an immune system problem?   No   In the past 3 months, have you taken medications that affect  your immune system, such as prednisone, other steroids, or anticancer drugs; drugs for the treatment of rheumatoid arthritis, Crohn s disease, or psoriasis; or have you had radiation treatments?   No   Have you had a seizure, or a brain or other nervous system problem?   No   During the past year, have you received a transfusion of blood or blood    products, or been given immune (gamma) globulin or antiviral drug?   No   For women: Are you pregnant or is there a chance you could become       pregnant during the next month?   No   Have you received any vaccinations in the past 4 weeks?   No     Immunization questionnaire was positive for at least one answer.  Notified no one.      Patient instructed to remain in clinic for 15 minutes afterwards, and to report any adverse reactions.     Screening performed by Freya Flores CMA on 7/11/2023 at 8:45 AM.

## 2023-07-14 LAB
BKR LAB AP GYN ADEQUACY: NORMAL
BKR LAB AP GYN INTERPRETATION: NORMAL
BKR LAB AP HPV REFLEX: NORMAL
BKR LAB AP PREVIOUS ABNORMAL: NORMAL
PATH REPORT.COMMENTS IMP SPEC: NORMAL
PATH REPORT.COMMENTS IMP SPEC: NORMAL
PATH REPORT.RELEVANT HX SPEC: NORMAL

## 2023-07-18 LAB
HUMAN PAPILLOMA VIRUS 16 DNA: NEGATIVE
HUMAN PAPILLOMA VIRUS 18 DNA: NEGATIVE
HUMAN PAPILLOMA VIRUS FINAL DIAGNOSIS: NORMAL
HUMAN PAPILLOMA VIRUS OTHER HR: NEGATIVE

## 2023-09-27 ENCOUNTER — OFFICE VISIT (OUTPATIENT)
Dept: FAMILY MEDICINE | Facility: CLINIC | Age: 63
End: 2023-09-27
Payer: COMMERCIAL

## 2023-09-27 VITALS
BODY MASS INDEX: 28.52 KG/M2 | OXYGEN SATURATION: 97 % | DIASTOLIC BLOOD PRESSURE: 80 MMHG | TEMPERATURE: 98 F | SYSTOLIC BLOOD PRESSURE: 150 MMHG | RESPIRATION RATE: 21 BRPM | HEIGHT: 70 IN | HEART RATE: 97 BPM | WEIGHT: 199.2 LBS

## 2023-09-27 DIAGNOSIS — L03.116 CELLULITIS OF LEFT LOWER EXTREMITY: ICD-10-CM

## 2023-09-27 DIAGNOSIS — Z09 HOSPITAL DISCHARGE FOLLOW-UP: Primary | ICD-10-CM

## 2023-09-27 DIAGNOSIS — Z23 NEED FOR COVID-19 VACCINE: ICD-10-CM

## 2023-09-27 DIAGNOSIS — I10 PRIMARY HYPERTENSION: ICD-10-CM

## 2023-09-27 DIAGNOSIS — A41.9 SEPSIS, DUE TO UNSPECIFIED ORGANISM, UNSPECIFIED WHETHER ACUTE ORGAN DYSFUNCTION PRESENT (H): ICD-10-CM

## 2023-09-27 DIAGNOSIS — Z23 NEED FOR INFLUENZA VACCINATION: ICD-10-CM

## 2023-09-27 PROCEDURE — 91320 SARSCV2 VAC 30MCG TRS-SUC IM: CPT | Performed by: PHYSICIAN ASSISTANT

## 2023-09-27 PROCEDURE — 99213 OFFICE O/P EST LOW 20 MIN: CPT | Mod: 25 | Performed by: PHYSICIAN ASSISTANT

## 2023-09-27 PROCEDURE — 90471 IMMUNIZATION ADMIN: CPT | Performed by: PHYSICIAN ASSISTANT

## 2023-09-27 PROCEDURE — 90480 ADMN SARSCOV2 VAC 1/ONLY CMP: CPT | Performed by: PHYSICIAN ASSISTANT

## 2023-09-27 PROCEDURE — 90682 RIV4 VACC RECOMBINANT DNA IM: CPT | Performed by: PHYSICIAN ASSISTANT

## 2023-09-27 NOTE — PROGRESS NOTES
Prior to immunization administration, verified patients identity using patient s name and date of birth. Please see Immunization Activity for additional information.     Screening Questionnaire for Adult Immunization    Are you sick today?   No   Do you have allergies to medications, food, a vaccine component or latex?   No   Have you ever had a serious reaction after receiving a vaccination?   No   Do you have a long-term health problem with heart, lung, kidney, or metabolic disease (e.g., diabetes), asthma, a blood disorder, no spleen, complement component deficiency, a cochlear implant, or a spinal fluid leak?  Are you on long-term aspirin therapy?   No   Do you have cancer, leukemia, HIV/AIDS, or any other immune system problem?   No   Do you have a parent, brother, or sister with an immune system problem?   No   In the past 3 months, have you taken medications that affect  your immune system, such as prednisone, other steroids, or anticancer drugs; drugs for the treatment of rheumatoid arthritis, Crohn s disease, or psoriasis; or have you had radiation treatments?   No   Have you had a seizure, or a brain or other nervous system problem?   No   During the past year, have you received a transfusion of blood or blood    products, or been given immune (gamma) globulin or antiviral drug?   No   For women: Are you pregnant or is there a chance you could become       pregnant during the next month?   No   Have you received any vaccinations in the past 4 weeks?   No     Immunization questionnaire answers were all negative.      Patient instructed to remain in clinic for 15 minutes afterwards, and to report any adverse reactions.     Screening performed by Kala Ramos CMA on 9/27/2023 at 10:09 AM.

## 2023-09-27 NOTE — PROGRESS NOTES
Assessment & Plan     Hospital discharge follow-up    Improving.      Cellulitis of left lower extremity    Improving. No sign of current infection.      Sepsis, due to unspecified organism, unspecified whether acute organ dysfunction present (H)    Resolved.      Primary hypertension    Re-start telmisartan due to elevated blood pressures. Monitor closely at home and let us know if dropping too low.      Need for COVID-19 vaccine    - COVID-19 12+ (2023-24) (PFIZER)    Need for influenza vaccination    - INFLUENZA VACCINE 18-64Y (FLUBLOK)             MED REC REQUIRED  Post Medication Reconciliation Status:  Discharge medications reconciled and changed, see notes/orders        MARVEL Mcgu[facility]    Minda Ross is a 63 year old, presenting for the following health issues:  Hospital F/U        9/27/2023     9:25 AM   Additional Questions   Roomed by Poppy Rodrigez       History of Present Illness       Reason for visit:  Hospital follow up  Symptom onset:  3-4 weeks ago  Symptoms include:  Vascular ulcers, infection  Symptom intensity:  Severe  Symptom progression:  Improving  Had these symptoms before:  No    She eats 0-1 servings of fruits and vegetables daily.She consumes 0 sweetened beverage(s) daily.She exercises with enough effort to increase her heart rate 9 or less minutes per day.  She exercises with enough effort to increase her heart rate 3 or less days per week.   She is taking medications regularly.       Hospital Follow-up Visit:    Hospital[facility]Nursing [facility]/IP Rehab [facility]  Date of Admission: 9/5/23  Date of Discharge: 9/15/23  Reason(s) for Admission: Visit Diagnosis: L LE wound  1. Cellulitis of left lower extremity   2. Sepsis, due to unspecified organism, unspecified whether acute organ dysfunction present ([facility])     Was your hospitalization related to COVID-19? No   Problems taking medications regularly:   "None  Medication changes since discharge: None  Problems adhering to non-medication therapy:  None    Summary of hospitalization:  CareEverywhere information obtained and reviewed  Diagnostic Tests/Treatments reviewed.  Follow up needed: none  Other Healthcare Providers Involved in Patient s Care:         None  Update since discharge: improved.         Review of Systems   Constitutional, HEENT, cardiovascular, pulmonary, gi and gu systems are negative, except as otherwise noted.        Objective    BP (!) 150/80 (BP Location: Right arm, Patient Position: Sitting, Cuff Size: Adult Regular)   Pulse 97   Temp 98  F (36.7  C) (Oral)   Resp 21   Ht 1.778 m (5' 10\")   Wt 90.4 kg (199 lb 3.2 oz)   LMP 08/05/2012   SpO2 97%   BMI 28.58 kg/m    Body mass index is 28.58 kg/m .        Physical Exam   GENERAL: healthy, alert and no distress  EYES: Eyes grossly normal to inspection, PERRL and conjunctivae and sclerae normal  RESP: lungs clear to auscultation - no rales, rhonchi or wheezes  CV: regular rate and rhythm, normal S1 S2, no S3 or S4, no murmur, click or rub, no peripheral edema and peripheral pulses strong  MS: no gross musculoskeletal defects noted, no edema  SKIN: no suspicious lesions or rashes  NEURO: Normal strength and tone, mentation intact and speech normal  PSYCH: mentation appears normal, affect normal/bright  Examined above the bandage of left lower leg and no erythema. Discoloration consistent with venous stasis but no sign of infection. Didn't disrupt bandages since she just had woundcare appointment yesterday.                      "

## 2023-11-27 DIAGNOSIS — I10 PRIMARY HYPERTENSION: ICD-10-CM

## 2023-11-27 DIAGNOSIS — Q87.2 KLIPPEL TRENAUNAY SYNDROME: ICD-10-CM

## 2023-11-27 DIAGNOSIS — R60.0 EDEMA OF LOWER EXTREMITY: ICD-10-CM

## 2023-11-27 RX ORDER — TELMISARTAN 80 MG/1
TABLET ORAL
Qty: 90 TABLET | Refills: 1 | Status: SHIPPED | OUTPATIENT
Start: 2023-11-27 | End: 2024-05-28

## 2024-02-05 ENCOUNTER — TRANSFERRED RECORDS (OUTPATIENT)
Dept: FAMILY MEDICINE | Facility: CLINIC | Age: 64
End: 2024-02-05

## 2024-02-20 ENCOUNTER — PATIENT OUTREACH (OUTPATIENT)
Dept: CARE COORDINATION | Facility: CLINIC | Age: 64
End: 2024-02-20
Payer: COMMERCIAL

## 2024-03-08 ENCOUNTER — TRANSFERRED RECORDS (OUTPATIENT)
Dept: FAMILY MEDICINE | Facility: CLINIC | Age: 64
End: 2024-03-08
Payer: COMMERCIAL

## 2024-03-19 ENCOUNTER — PATIENT OUTREACH (OUTPATIENT)
Dept: CARE COORDINATION | Facility: CLINIC | Age: 64
End: 2024-03-19
Payer: COMMERCIAL

## 2024-05-26 DIAGNOSIS — R60.0 EDEMA OF LOWER EXTREMITY: ICD-10-CM

## 2024-05-26 DIAGNOSIS — I10 PRIMARY HYPERTENSION: ICD-10-CM

## 2024-05-26 DIAGNOSIS — Q87.2 KLIPPEL TRENAUNAY SYNDROME: ICD-10-CM

## 2024-05-28 RX ORDER — TELMISARTAN 80 MG/1
TABLET ORAL
Qty: 90 TABLET | Refills: 1 | Status: SHIPPED | OUTPATIENT
Start: 2024-05-28

## 2024-06-02 ENCOUNTER — HEALTH MAINTENANCE LETTER (OUTPATIENT)
Age: 64
End: 2024-06-02

## 2024-06-11 ENCOUNTER — PATIENT OUTREACH (OUTPATIENT)
Dept: CARE COORDINATION | Facility: CLINIC | Age: 64
End: 2024-06-11
Payer: COMMERCIAL

## 2024-06-25 ENCOUNTER — PATIENT OUTREACH (OUTPATIENT)
Dept: CARE COORDINATION | Facility: CLINIC | Age: 64
End: 2024-06-25
Payer: COMMERCIAL

## 2024-07-30 ENCOUNTER — TRANSFERRED RECORDS (OUTPATIENT)
Dept: HEALTH INFORMATION MANAGEMENT | Facility: CLINIC | Age: 64
End: 2024-07-30
Payer: COMMERCIAL

## 2024-08-11 ENCOUNTER — HEALTH MAINTENANCE LETTER (OUTPATIENT)
Age: 64
End: 2024-08-11

## 2024-09-17 ENCOUNTER — PATIENT OUTREACH (OUTPATIENT)
Dept: CARE COORDINATION | Facility: CLINIC | Age: 64
End: 2024-09-17
Payer: COMMERCIAL

## 2024-10-02 ENCOUNTER — TELEPHONE (OUTPATIENT)
Dept: FAMILY MEDICINE | Facility: CLINIC | Age: 64
End: 2024-10-02
Payer: COMMERCIAL

## 2024-10-02 NOTE — TELEPHONE ENCOUNTER
Reason for Call:  Appointment Request    Patient requesting this type of appt:  Preventive     Requested provider: Antonio Mccauley    Reason patient unable to be scheduled: Not within requested timeframe    When does patient want to be seen/preferred time:  Month of October or November    Comments: Patient is requesting to schedule her yearly physical with her primary care provider. Unable to find appointment before end of February.     Could we send this information to you in [facility] or would you prefer to receive a phone call?:   No preference   Okay to leave a detailed message?: Yes at Cell number on file:    Telephone Information:   Mobile [phone]       Call taken on 10/2/2024 at 11:46 AM by Shena Olivares

## 2024-10-04 SDOH — HEALTH STABILITY: PHYSICAL HEALTH
ON AVERAGE, HOW MANY DAYS PER WEEK DO YOU ENGAGE IN MODERATE TO STRENUOUS EXERCISE (LIKE A BRISK WALK)?: PATIENT DECLINED

## 2024-10-04 SDOH — HEALTH STABILITY: PHYSICAL HEALTH: ON AVERAGE, HOW MANY MINUTES DO YOU ENGAGE IN EXERCISE AT THIS LEVEL?: PATIENT DECLINED

## 2024-10-04 ASSESSMENT — SOCIAL DETERMINANTS OF HEALTH (SDOH): HOW OFTEN DO YOU GET TOGETHER WITH FRIENDS OR RELATIVES?: PATIENT DECLINED

## 2024-10-07 ENCOUNTER — OFFICE VISIT (OUTPATIENT)
Dept: FAMILY MEDICINE | Facility: CLINIC | Age: 64
End: 2024-10-07
Payer: COMMERCIAL

## 2024-10-07 VITALS
RESPIRATION RATE: 21 BRPM | OXYGEN SATURATION: 100 % | SYSTOLIC BLOOD PRESSURE: 138 MMHG | WEIGHT: 204.6 LBS | BODY MASS INDEX: 29.29 KG/M2 | HEART RATE: 77 BPM | DIASTOLIC BLOOD PRESSURE: 76 MMHG | HEIGHT: 70 IN | TEMPERATURE: 97.5 F

## 2024-10-07 DIAGNOSIS — Z00.00 ENCOUNTER FOR PREVENTIVE CARE: Primary | ICD-10-CM

## 2024-10-07 DIAGNOSIS — Z12.31 VISIT FOR SCREENING MAMMOGRAM: ICD-10-CM

## 2024-10-07 DIAGNOSIS — R60.0 EDEMA OF LOWER EXTREMITY: ICD-10-CM

## 2024-10-07 DIAGNOSIS — Q87.2 KLIPPEL TRENAUNAY SYNDROME: ICD-10-CM

## 2024-10-07 DIAGNOSIS — I10 PRIMARY HYPERTENSION: ICD-10-CM

## 2024-10-07 LAB
ALBUMIN SERPL BCG-MCNC: 4.4 G/DL (ref 3.5–5.2)
ALP SERPL-CCNC: 70 U/L (ref 40–150)
ALT SERPL W P-5'-P-CCNC: 13 U/L (ref 0–50)
ANION GAP SERPL CALCULATED.3IONS-SCNC: 12 MMOL/L (ref 7–15)
AST SERPL W P-5'-P-CCNC: 19 U/L (ref 0–45)
BASOPHILS # BLD AUTO: 0 10E3/UL (ref 0–0.2)
BASOPHILS NFR BLD AUTO: 0 %
BILIRUB SERPL-MCNC: 0.4 MG/DL
BUN SERPL-MCNC: 10.4 MG/DL (ref 8–23)
CALCIUM SERPL-MCNC: 9.5 MG/DL (ref 8.8–10.4)
CHLORIDE SERPL-SCNC: 102 MMOL/L (ref 98–107)
CHOLEST SERPL-MCNC: 272 MG/DL
CREAT SERPL-MCNC: 0.54 MG/DL (ref 0.51–0.95)
EGFRCR SERPLBLD CKD-EPI 2021: >90 ML/MIN/1.73M2
EOSINOPHIL # BLD AUTO: 0.2 10E3/UL (ref 0–0.7)
EOSINOPHIL NFR BLD AUTO: 4 %
ERYTHROCYTE [DISTWIDTH] IN BLOOD BY AUTOMATED COUNT: 17.8 % (ref 10–15)
FASTING STATUS PATIENT QL REPORTED: YES
FASTING STATUS PATIENT QL REPORTED: YES
GLUCOSE SERPL-MCNC: 115 MG/DL (ref 70–99)
HCO3 SERPL-SCNC: 24 MMOL/L (ref 22–29)
HCT VFR BLD AUTO: 34.1 % (ref 35–47)
HDLC SERPL-MCNC: 82 MG/DL
HGB BLD-MCNC: 10.2 G/DL (ref 11.7–15.7)
IMM GRANULOCYTES # BLD: 0 10E3/UL
IMM GRANULOCYTES NFR BLD: 0 %
LDLC SERPL CALC-MCNC: 179 MG/DL
LYMPHOCYTES # BLD AUTO: 1.4 10E3/UL (ref 0.8–5.3)
LYMPHOCYTES NFR BLD AUTO: 25 %
MCH RBC QN AUTO: 25.2 PG (ref 26.5–33)
MCHC RBC AUTO-ENTMCNC: 29.9 G/DL (ref 31.5–36.5)
MCV RBC AUTO: 84 FL (ref 78–100)
MONOCYTES # BLD AUTO: 0.4 10E3/UL (ref 0–1.3)
MONOCYTES NFR BLD AUTO: 7 %
NEUTROPHILS # BLD AUTO: 3.7 10E3/UL (ref 1.6–8.3)
NEUTROPHILS NFR BLD AUTO: 65 %
NONHDLC SERPL-MCNC: 190 MG/DL
PLATELET # BLD AUTO: 346 10E3/UL (ref 150–450)
POTASSIUM SERPL-SCNC: 4.4 MMOL/L (ref 3.4–5.3)
PROT SERPL-MCNC: 7.6 G/DL (ref 6.4–8.3)
RBC # BLD AUTO: 4.04 10E6/UL (ref 3.8–5.2)
SODIUM SERPL-SCNC: 138 MMOL/L (ref 135–145)
TRIGL SERPL-MCNC: 53 MG/DL
TSH SERPL DL<=0.005 MIU/L-ACNC: 4.13 UIU/ML (ref 0.3–4.2)
WBC # BLD AUTO: 5.7 10E3/UL (ref 4–11)

## 2024-10-07 PROCEDURE — 80053 COMPREHEN METABOLIC PANEL: CPT | Performed by: INTERNAL MEDICINE

## 2024-10-07 PROCEDURE — 99396 PREV VISIT EST AGE 40-64: CPT | Mod: 25 | Performed by: INTERNAL MEDICINE

## 2024-10-07 PROCEDURE — 84443 ASSAY THYROID STIM HORMONE: CPT | Performed by: INTERNAL MEDICINE

## 2024-10-07 PROCEDURE — 85025 COMPLETE CBC W/AUTO DIFF WBC: CPT | Performed by: INTERNAL MEDICINE

## 2024-10-07 PROCEDURE — 99214 OFFICE O/P EST MOD 30 MIN: CPT | Mod: 25 | Performed by: INTERNAL MEDICINE

## 2024-10-07 PROCEDURE — 90480 ADMN SARSCOV2 VAC 1/ONLY CMP: CPT | Performed by: INTERNAL MEDICINE

## 2024-10-07 PROCEDURE — 90471 IMMUNIZATION ADMIN: CPT | Performed by: INTERNAL MEDICINE

## 2024-10-07 PROCEDURE — 90673 RIV3 VACCINE NO PRESERV IM: CPT | Performed by: INTERNAL MEDICINE

## 2024-10-07 PROCEDURE — 36415 COLL VENOUS BLD VENIPUNCTURE: CPT | Performed by: INTERNAL MEDICINE

## 2024-10-07 PROCEDURE — 80061 LIPID PANEL: CPT | Performed by: INTERNAL MEDICINE

## 2024-10-07 PROCEDURE — 91320 SARSCV2 VAC 30MCG TRS-SUC IM: CPT | Performed by: INTERNAL MEDICINE

## 2024-10-07 RX ORDER — SUCRALFATE 1 G/1
TABLET ORAL
COMMUNITY
Start: 2024-07-15

## 2024-10-07 ASSESSMENT — PAIN SCALES - GENERAL: PAINLEVEL: NO PAIN (0)

## 2024-10-07 NOTE — PATIENT INSTRUCTIONS
LABS    Please call the number below to schedule breast cancer screening:    [facility]  [address]  [place], [place] [address] [phone] (Scheduling)     VACCINES I RECOMMEND:        SHINGLES vaccine (at the drugstore) this is a 2 shot series (not covered at doctor's office)

## 2024-10-07 NOTE — PROGRESS NOTES
"Preventive Care Visit  [facility] Mccauley MD, Internal Medicine  Oct 7, 2024      Assessment & Plan     Encounter for preventive care  Age and gender appropriate preventive care and screenings are discussed.  Particular attention to personal preventive care and age appropriate lifestyle including the incorporation of healthy diet and physical activity is made.      - CBC with Platelets & Differential  - Comprehensive metabolic panel  - TSH with free T4 reflex  - Lipid panel reflex to direct LDL Fasting    Klippel Trenaunay syndrome  With chronic lymphedema.  Compression stockings ordered.  She normally gets it from [place].  She may also get it from [facility] if she wishes.  - CBC with Platelets & Differential  - Comprehensive metabolic panel  - TSH with free T4 reflex  - Lipid panel reflex to direct LDL Fasting  - COMPRESSION STOCKINGS  Dispense: 2 each; Refill: 1  - Compression Sleeve/Stocking Order for DME - ONLY FOR DME    Primary hypertension  Initially high.  Better on recheck.  Labs ordered.  - CBC with Platelets & Differential  - Comprehensive metabolic panel  - TSH with free T4 reflex  - Lipid panel reflex to direct LDL Fasting    Visit for screening mammogram    - MA Screening Bilateral w/ Jaya    Edema of lower extremity  As above  - COMPRESSION STOCKINGS  Dispense: 2 each; Refill: 1  - Compression Sleeve/Stocking Order for DME - ONLY FOR DME          BMI  Estimated body mass index is 29.36 kg/m  as calculated from the following:    Height as of this encounter: 1.778 m (5' 10\").    Weight as of this encounter: 92.8 kg (204 lb 9.6 oz).       Counseling  Appropriate preventive services were addressed with this patient via screening, questionnaire, or discussion as appropriate for fall prevention, nutrition, physical activity, Tobacco-use cessation, social engagement, weight loss and cognition.  Checklist reviewing preventive services available has been given to the " patient.  Reviewed patient's diet, addressing concerns and/or questions.           Subjective   Vandana is a 64 year old, presenting for the following:  Physical         Health Care Directive  Patient does not have a Health Care Directive or Living Will: Discussed advance care planning with patient; however, patient declined at this time.    HPI  Overall well.  A little over a year ago she was at [facility] for several days with an open sore/sepsis.  She has chronic edema due to underlying medical condition.      Her and her  have driven out to her [facility] 3 times over the last few months.  This is a favorite place to theirs.            10/4/2024   General Health   How would you rate your overall physical health? Good   Feel stress (tense, anxious, or unable to sleep) To some extent      (!) STRESS CONCERN      10/4/2024   Nutrition   Three or more servings of calcium each day? Yes   Diet: Regular (no restrictions)   How many servings of fruit and vegetables per day? (!) 0-1   How many sweetened beverages each day? 0-1            10/4/2024   Exercise   Days per week of moderate/strenous exercise Patient declined   Average minutes spent exercising at this level Patient declined            10/4/2024   Social Factors   Frequency of gathering with friends or relatives Patient declined   Worry food won't last until get money to buy more No   Food not last or not have enough money for food? No   Do you have housing? (Housing is defined as stable permanent housing and does not include staying ouside in a car, in a tent, in an abandoned building, in an overnight shelter, or couch-surfing.) Yes   Are you worried about losing your housing? No   Lack of transportation? No   Unable to get utilities (heat,electricity)? No            10/7/2024   Fall Risk   Gait Speed Test (Document in seconds) 6.31   Gait Speed Test Interpretation Greater than 5.01 seconds - ABNORMAL             10/4/2024   Dental    Dentist two times every year? Yes            10/4/2024   TB Screening   Were you born outside of the [place]? No            Today's PHQ-2 Score:       10/7/2024     9:30 AM   PHQ-2 (  [employer])   Q1: Little interest or pleasure in doing things 0   Q2: Feeling down, depressed or hopeless 0   PHQ-2 Score 0   Q1: Little interest or pleasure in doing things Not at all   Q2: Feeling down, depressed or hopeless Not at all   PHQ-2 Score 0           10/4/2024   Substance Use   Alcohol more than 3/day or more than 7/wk No   Do you use any other substances recreationally? No        Social History     Tobacco Use    Smoking status: Former     Current packs/day: 0.00     Average packs/day: 0.3 packs/day for 2.0 years (0.5 ttl pk-yrs)     Types: Cigarettes     Start date: 10/1/1990     Quit date: 10/1/1992     Years since quittin.0    Smokeless tobacco: Never    Tobacco comments:     Quit    Vaping Use    Vaping status: Never Used   Substance Use Topics    Alcohol use: Yes     Comment: couple beers per day    Drug use: No           3/21/2022   LAST FHS-7 RESULTS   1st degree relative breast or ovarian cancer No   Any relative bilateral breast cancer No   Any male have breast cancer No   Any ONE woman have BOTH breast AND ovarian cancer No   Any woman with breast cancer before 50yrs No   2 or more relatives with breast AND/OR ovarian cancer No   2 or more relatives with breast AND/OR bowel cancer No                   10/4/2024   STI Screening   New sexual partner(s) since last STI/HIV test? No        History of abnormal Pap smear:         Latest Ref Rng & Units 2023     8:28 AM   PAP / HPV   PAP  Negative for Intraepithelial Lesion or Malignancy (NILM)    HPV 16 DNA Negative Negative    HPV 18 DNA Negative Negative    Other HR HPV Negative Negative      ASCVD Risk   The 10-year ASCVD risk score (Andres LYONS, et al., 2019) is: 8.3%    Values used to calculate the score:      Age: 64 years      Sex: Female       "Is Non- : No      Diabetic: No      Tobacco smoker: No      Systolic Blood Pressure: 138 mmHg      Is BP treated: Yes      HDL Cholesterol: 69 mg/dL      Total Cholesterol: 270 mg/dL           Reviewed and updated as needed this visit by Provider                             Objective    Exam  /76   Pulse 77   Temp 97.5  F (36.4  C) (Temporal)   Resp 21   Ht 1.778 m (5' 10\")   Wt 92.8 kg (204 lb 9.6 oz)   LMP 08/05/2012   SpO2 100%   BMI 29.36 kg/m     Estimated body mass index is 29.36 kg/m  as calculated from the following:    Height as of this encounter: 1.778 m (5' 10\").    Weight as of this encounter: 92.8 kg (204 lb 9.6 oz).    Physical Exam          Signed Electronically by: Antonio Mccauley MD    "

## 2024-10-16 ENCOUNTER — HOSPITAL ENCOUNTER (OUTPATIENT)
Dept: MAMMOGRAPHY | Facility: CLINIC | Age: 64
Discharge: HOME | End: 2024-10-16
Attending: INTERNAL MEDICINE | Admitting: INTERNAL MEDICINE
Payer: COMMERCIAL

## 2024-10-16 DIAGNOSIS — Z12.31 VISIT FOR SCREENING MAMMOGRAM: ICD-10-CM

## 2024-10-16 PROCEDURE — 77063 BREAST TOMOSYNTHESIS BI: CPT

## 2024-10-22 ENCOUNTER — HOSPITAL ENCOUNTER (OUTPATIENT)
Dept: MAMMOGRAPHY | Facility: CLINIC | Age: 64
Discharge: HOME | End: 2024-10-22
Attending: INTERNAL MEDICINE | Admitting: INTERNAL MEDICINE
Payer: COMMERCIAL

## 2024-10-22 DIAGNOSIS — R92.8 ABNORMAL MAMMOGRAM: ICD-10-CM

## 2024-10-22 PROCEDURE — 76642 ULTRASOUND BREAST LIMITED: CPT | Mod: RT

## 2024-10-24 ENCOUNTER — HOSPITAL ENCOUNTER (OUTPATIENT)
Dept: MAMMOGRAPHY | Facility: CLINIC | Age: 64
Discharge: HOME | End: 2024-10-24
Attending: INTERNAL MEDICINE
Payer: COMMERCIAL

## 2024-10-24 DIAGNOSIS — N63.10 MASS OF MULTIPLE SITES OF RIGHT BREAST: ICD-10-CM

## 2024-10-24 DIAGNOSIS — R92.8 ABNORMAL MAMMOGRAM: ICD-10-CM

## 2024-10-24 PROCEDURE — 999N000065 MA POST PROCEDURE RIGHT

## 2024-10-24 PROCEDURE — 88342 IMHCHEM/IMCYTCHM 1ST ANTB: CPT | Mod: 26 | Performed by: STUDENT IN AN ORGANIZED HEALTH CARE EDUCATION/TRAINING PROGRAM

## 2024-10-24 PROCEDURE — A4648 IMPLANTABLE TISSUE MARKER: HCPCS

## 2024-10-24 PROCEDURE — 88305 TISSUE EXAM BY PATHOLOGIST: CPT | Mod: 26 | Performed by: STUDENT IN AN ORGANIZED HEALTH CARE EDUCATION/TRAINING PROGRAM

## 2024-10-24 PROCEDURE — 88360 TUMOR IMMUNOHISTOCHEM/MANUAL: CPT | Mod: 26 | Performed by: STUDENT IN AN ORGANIZED HEALTH CARE EDUCATION/TRAINING PROGRAM

## 2024-10-24 PROCEDURE — 88305 TISSUE EXAM BY PATHOLOGIST: CPT | Mod: TC | Performed by: INTERNAL MEDICINE

## 2024-10-24 PROCEDURE — 19083 BX BREAST 1ST LESION US IMAG: CPT | Mod: RT

## 2024-10-24 PROCEDURE — 250N000009 HC RX 250: Performed by: INTERNAL MEDICINE

## 2024-10-24 RX ADMIN — LIDOCAINE HYDROCHLORIDE 10 ML: 10 INJECTION, SOLUTION INFILTRATION; PERINEURAL at 14:08

## 2024-10-24 NOTE — DISCHARGE INSTRUCTIONS
After Your Breast Biopsy  Bleeding, bruising, and pain  Breast tenderness and some bruising is normal and may last several days. You may wear your bra overnight to support the breast.  You may use an ice pack for pain. Place it over the area for 15 to 20 minutes, several times a day.  You may take over-the-counter pain medicine:  On the day of the biopsy, we recommend Tylenol (acetaminophen) because it does not raise your risk of bleeding.  The next day, you may take an anti-inflammatory medicine (aspirin, ibuprofen, Motrin, Aleve, Advil), unless your doctor tells you not to.  Bandages and showering  Keep your bandage in place until tomorrow morning. Don't get it wet.  If you have small pieces of tape on the skin, leave them in place. They will fall off on their own, or you can remove them after 5 days.  You may shower the next morning after your biopsy.  Activity  No heavy activity (no running, no gym workouts, no lifting, no vacuuming, etc.) on the day of your biopsy.  You may go back to normal activity the next day. But limit what you do if you still have pain or discomfort.  Infection  Infection is rare. Signs of infection include:  Fever (including sweats and chills)  Redness  Pain that gets worse  Fluid draining from the biopsy site  Biopsy results  Results may take up to 5 business days.  A nurse or doctor from the Breast Center will call with your results. We will also send the results to the doctor that ordered your biopsy.  If you have not gotten your results in 5 days, please call the Breast Center.  Call the Breast Center with questions or if:   You have bleeding that lasts more than 20 minutes.  You have pain that you can't control.  You have signs of infection (fever, sweats, chills, redness, increasing pain, or drainage).  After hours, please call the doctor who ordered your biopsy.  For informational purposes only. Not to replace the advice of your health care provider. Copyright   2010 [facility]. All rights reserved. Clinically reviewed by Winifred Mchugh, Director, [facility]. [employer] [id number] - REV 08/23.

## 2024-10-24 NOTE — DISCHARGE INSTRUCTIONS
After Your Breast Biopsy  Bleeding, bruising, and pain  Breast tenderness and some bruising is normal and may last several days. You may wear your bra overnight to support the breast.  You may use an ice pack for pain. Place it over the area for 15 to 20 minutes, several times a day.  You may take over-the-counter pain medicine:  On the day of the biopsy, we recommend Tylenol (acetaminophen) because it does not raise your risk of bleeding.  The next day, you may take an anti-inflammatory medicine (aspirin, ibuprofen, Motrin, Aleve, Advil), unless your doctor tells you not to.  Bandages and showering  Keep your bandage in place until tomorrow morning. Don't get it wet.  If you have small pieces of tape on the skin, leave them in place. They will fall off on their own, or you can remove them after 5 days.  You may shower the next morning after your biopsy.  Activity  No heavy activity (no running, no gym workouts, no lifting, no vacuuming, etc.) on the day of your biopsy.  You may go back to normal activity the next day. But limit what you do if you still have pain or discomfort.  Infection  Infection is rare. Signs of infection include:  Fever (including sweats and chills)  Redness  Pain that gets worse  Fluid draining from the biopsy site  Biopsy results  Results may take up to 5 business days.  A nurse or doctor from the Breast Center will call with your results. We will also send the results to the doctor that ordered your biopsy.  If you have not gotten your results in 5 days, please call the Breast Center.  Call the Breast Center with questions or if:   You have bleeding that lasts more than 20 minutes.  You have pain that you can't control.  You have signs of infection (fever, sweats, chills, redness, increasing pain, or drainage).  After hours, please call the doctor who ordered your biopsy.  For informational purposes only. Not to replace the advice of your health care provider. Copyright   2010 [facility]. All rights reserved. Clinically reviewed by Winifred Mchugh, Director, [facility]. [employer] [id number] - REV 08/23.

## 2024-10-28 ENCOUNTER — MYC MEDICAL ADVICE (OUTPATIENT)
Dept: MAMMOGRAPHY | Facility: CLINIC | Age: 64
End: 2024-10-28
Payer: COMMERCIAL

## 2024-10-28 LAB
PATH REPORT.COMMENTS IMP SPEC: ABNORMAL
PATH REPORT.COMMENTS IMP SPEC: YES
PATH REPORT.FINAL DX SPEC: ABNORMAL
PATH REPORT.GROSS SPEC: ABNORMAL
PATH REPORT.MICROSCOPIC SPEC OTHER STN: ABNORMAL
PATH REPORT.RELEVANT HX SPEC: ABNORMAL
PATHOLOGY SYNOPTIC REPORT: ABNORMAL
PHOTO IMAGE: ABNORMAL

## 2024-10-28 NOTE — PROGRESS NOTES
Malignant Path:  Pathology report reviewed with our breast radiologist Dr. Melchor Ariza, who confirmed the recent breast imaging is concordant with the final surgical pathology results below.    I phoned Ms. Vandana Gonzalez, confirmed her full name, date of birth, and notified patient of Ultrasound Guided Right Breast Biopsy (9:30, 9 cm FN) results showing invasive ductal carcinoma.  Estrogen receptor is positive, Progesterone receptor is negative and HER2 is negative. I also notified patient of Ultrasound Guided Right Breast Biopsy (8:00, 10 cm FN) results showing invasive ductal carcinoma. Estrogen receptor is positive, Progesterone receptor is positive and HER2 is negative.    Patient states no problems with biopsy site.    Recommended follow up is Medical Oncology, Surgical Consultation & MRI.    Vandana would like to be seen by Dr. Emeterio Galvan (Medical Oncology) at [facility]. She has been in touch with him already. She plans on seeing a Surgeon at [facility] as well.     I asked that she let me know when her appointments are scheduled.     Questions were answered and she has my phone number if she has further questions.  Patient verbalized understanding and agrees with the plan of care.  Ordering provider- Dr. Antonio Mccauley has been notified of the results, recommendations for follow up, and scheduled surgical consultation.  I will forward this note, along with the pathology results.      Gwen Juarez, RN, BSN, PHN  Breast Care Nurse Coordinator  [facility]  [facility]  [address], [address]  [place] [address]  P: [phone]  F: [phone]  Client MRN:  Patient Room/Bed Number:  Submitter:  Client ID:  Report To: Laboratory Report Status:  Vandana Gonzalez [id number]  F, 1960  Surgical Pathology Report (Final result) [id number]  Authorizing Provider: Antonio Mccauley MD Ordering  Provider: Antonio Mccauley MD  Ordering Location: [facility]  Collected: 10/24/2024 02:15 PM  Pathologist: Andressa Khan MD Received: 10/24/2024 03:01 PM  .  Specimens  A Breast, Right, Breast Biopsy Needle  B Breast, Right  .  .  Final Diagnosis  A. Breast, right, 9:30, 9.0 cm from nipple, 0.8 cm size, biopsy:  -INVASIVE DUCTAL CARCINOMA, [place] grade 2, see commment.  -Invasive carcinoma involves multiple tissue cores, and is 4 mm in greatest dimension in a single core.  -In situ carcinoma is not identified.  -Breast ancillary testing:  -Estrogen receptor: Positive (91 to 100%, strong)  -Progesterone receptor: Negative  -HER2 IHC: Negative (score 1+)  B. Breast, right, 8:00, 10.0 cm from nipple, 1.5 cm size, biopsy:  -INVASIVE DUCTAL CARCINOMA, [place] grade 2, see commment.  -Invasive carcinoma involves multiple tissue cores, and is 9 mm in greatest dimension in a single core.  -In situ carcinoma is not identified.  -Breast ancillary testing:  -Estrogen receptor: Positive (91 to 100%, strong)  -Progesterone receptor: Positive (81 to 90%, moderate)  -HER2 IHC: Negative (score 0)  Electronically signed by Andressa Khan MD on 10/28/2024 at 9:19

## 2024-10-31 NOTE — TELEPHONE ENCOUNTER
Chart reviewed in Care everywhere and Vandana is scheduled for a breast MRI at [facility]. I will sign off on following Vandana.     Gwen Juarez, RN, BSN, PHN  Breast Care Nurse Coordinator  [facility] [place] [place] [place]

## 2024-11-23 DIAGNOSIS — R60.0 EDEMA OF LOWER EXTREMITY: ICD-10-CM

## 2024-11-23 DIAGNOSIS — Q87.2 KLIPPEL TRENAUNAY SYNDROME: ICD-10-CM

## 2024-11-23 DIAGNOSIS — I10 PRIMARY HYPERTENSION: ICD-10-CM

## 2024-11-25 RX ORDER — TELMISARTAN 80 MG/1
TABLET ORAL
Qty: 90 TABLET | Refills: 3 | Status: SHIPPED | OUTPATIENT
Start: 2024-11-25

## 2024-12-17 ENCOUNTER — MYC MEDICAL ADVICE (OUTPATIENT)
Dept: FAMILY MEDICINE | Facility: CLINIC | Age: 64
End: 2024-12-17
Payer: COMMERCIAL

## 2024-12-17 DIAGNOSIS — R60.0 EDEMA OF LOWER EXTREMITY: Primary | ICD-10-CM

## 2024-12-17 NOTE — TELEPHONE ENCOUNTER
Rx faxed to [facility] f# [phone].    Charito notified pt.     Karina ROBLEDO MA

## 2025-05-13 ENCOUNTER — APPOINTMENT (OUTPATIENT)
Dept: GENERAL RADIOLOGY | Facility: CLINIC | Age: 65
DRG: 481 | End: 2025-05-13
Attending: EMERGENCY MEDICINE
Payer: COMMERCIAL

## 2025-05-13 ENCOUNTER — HOSPITAL ENCOUNTER (INPATIENT)
Facility: CLINIC | Age: 65
DRG: 481 | End: 2025-05-13
Attending: EMERGENCY MEDICINE | Admitting: INTERNAL MEDICINE
Payer: COMMERCIAL

## 2025-05-13 ENCOUNTER — APPOINTMENT (OUTPATIENT)
Dept: GENERAL RADIOLOGY | Facility: CLINIC | Age: 65
DRG: 481 | End: 2025-05-13
Attending: PHYSICIAN ASSISTANT
Payer: COMMERCIAL

## 2025-05-13 DIAGNOSIS — S72.142A CLOSED DISPLACED INTERTROCHANTERIC FRACTURE OF LEFT FEMUR, INITIAL ENCOUNTER (H): ICD-10-CM

## 2025-05-13 DIAGNOSIS — Z71.89 OTHER SPECIFIED COUNSELING: Chronic | ICD-10-CM

## 2025-05-13 DIAGNOSIS — Z51.89 ENCOUNTER FOR WOUND CARE: Primary | ICD-10-CM

## 2025-05-13 PROBLEM — D64.9 ANEMIA, UNSPECIFIED TYPE: Status: ACTIVE | Noted: 2021-10-07

## 2025-05-13 LAB
ABO + RH BLD: NORMAL
ANION GAP SERPL CALCULATED.3IONS-SCNC: 15 MMOL/L (ref 7–15)
APTT PPP: 26 SECONDS (ref 22–38)
ATRIAL RATE - MUSE: 78 BPM
BASOPHILS # BLD AUTO: 0 10E3/UL (ref 0–0.2)
BASOPHILS NFR BLD AUTO: 0 %
BLD GP AB SCN SERPL QL: NEGATIVE
BUN SERPL-MCNC: 6.7 MG/DL (ref 8–23)
CALCIUM SERPL-MCNC: 8.7 MG/DL (ref 8.8–10.4)
CHLORIDE SERPL-SCNC: 98 MMOL/L (ref 98–107)
CREAT SERPL-MCNC: 0.45 MG/DL (ref 0.51–0.95)
DIASTOLIC BLOOD PRESSURE - MUSE: NORMAL MMHG
EGFRCR SERPLBLD CKD-EPI 2021: >90 ML/MIN/1.73M2
EOSINOPHIL # BLD AUTO: 0.1 10E3/UL (ref 0–0.7)
EOSINOPHIL NFR BLD AUTO: 1 %
ERYTHROCYTE [DISTWIDTH] IN BLOOD BY AUTOMATED COUNT: 18.3 % (ref 10–15)
GLUCOSE SERPL-MCNC: 109 MG/DL (ref 70–99)
HCO3 SERPL-SCNC: 19 MMOL/L (ref 22–29)
HCT VFR BLD AUTO: 29.2 % (ref 35–47)
HGB BLD-MCNC: 9.3 G/DL (ref 11.7–15.7)
IMM GRANULOCYTES # BLD: 0 10E3/UL
IMM GRANULOCYTES NFR BLD: 0 %
INR PPP: 1.08 (ref 0.85–1.15)
INTERPRETATION ECG - MUSE: NORMAL
LYMPHOCYTES # BLD AUTO: 1.2 10E3/UL (ref 0.8–5.3)
LYMPHOCYTES NFR BLD AUTO: 14 %
MCH RBC QN AUTO: 26.9 PG (ref 26.5–33)
MCHC RBC AUTO-ENTMCNC: 31.8 G/DL (ref 31.5–36.5)
MCV RBC AUTO: 84 FL (ref 78–100)
MONOCYTES # BLD AUTO: 0.3 10E3/UL (ref 0–1.3)
MONOCYTES NFR BLD AUTO: 4 %
NEUTROPHILS # BLD AUTO: 6.8 10E3/UL (ref 1.6–8.3)
NEUTROPHILS NFR BLD AUTO: 81 %
NRBC # BLD AUTO: 0 10E3/UL
NRBC BLD AUTO-RTO: 0 /100
P AXIS - MUSE: 25 DEGREES
PLATELET # BLD AUTO: 239 10E3/UL (ref 150–450)
POTASSIUM SERPL-SCNC: 3.5 MMOL/L (ref 3.4–5.3)
PR INTERVAL - MUSE: 204 MS
PROTHROMBIN TIME: 13.7 SECONDS (ref 11.8–14.8)
QRS DURATION - MUSE: 84 MS
QT - MUSE: 430 MS
QTC - MUSE: 490 MS
R AXIS - MUSE: 11 DEGREES
RBC # BLD AUTO: 3.46 10E6/UL (ref 3.8–5.2)
SODIUM SERPL-SCNC: 132 MMOL/L (ref 135–145)
SPECIMEN EXP DATE BLD: NORMAL
SYSTOLIC BLOOD PRESSURE - MUSE: NORMAL MMHG
T AXIS - MUSE: 25 DEGREES
VENTRICULAR RATE- MUSE: 78 BPM
WBC # BLD AUTO: 8.5 10E3/UL (ref 4–11)

## 2025-05-13 PROCEDURE — 99223 1ST HOSP IP/OBS HIGH 75: CPT | Performed by: INTERNAL MEDICINE

## 2025-05-13 PROCEDURE — 120N000001 HC R&B MED SURG/OB

## 2025-05-13 PROCEDURE — 85025 COMPLETE CBC W/AUTO DIFF WBC: CPT | Performed by: EMERGENCY MEDICINE

## 2025-05-13 PROCEDURE — 258N000003 HC RX IP 258 OP 636: Performed by: EMERGENCY MEDICINE

## 2025-05-13 PROCEDURE — 250N000013 HC RX MED GY IP 250 OP 250 PS 637: Performed by: INTERNAL MEDICINE

## 2025-05-13 PROCEDURE — 96375 TX/PRO/DX INJ NEW DRUG ADDON: CPT

## 2025-05-13 PROCEDURE — 96374 THER/PROPH/DIAG INJ IV PUSH: CPT

## 2025-05-13 PROCEDURE — 86923 COMPATIBILITY TEST ELECTRIC: CPT | Performed by: INTERNAL MEDICINE

## 2025-05-13 PROCEDURE — 99207 PR NO BILLABLE SERVICE THIS VISIT: CPT | Performed by: INTERNAL MEDICINE

## 2025-05-13 PROCEDURE — 99418 PROLNG IP/OBS E/M EA 15 MIN: CPT | Performed by: INTERNAL MEDICINE

## 2025-05-13 PROCEDURE — 80048 BASIC METABOLIC PNL TOTAL CA: CPT | Performed by: EMERGENCY MEDICINE

## 2025-05-13 PROCEDURE — 86900 BLOOD TYPING SEROLOGIC ABO: CPT | Performed by: EMERGENCY MEDICINE

## 2025-05-13 PROCEDURE — 96376 TX/PRO/DX INJ SAME DRUG ADON: CPT

## 2025-05-13 PROCEDURE — 73502 X-RAY EXAM HIP UNI 2-3 VIEWS: CPT

## 2025-05-13 PROCEDURE — 99285 EMERGENCY DEPT VISIT HI MDM: CPT | Mod: 25

## 2025-05-13 PROCEDURE — 85730 THROMBOPLASTIN TIME PARTIAL: CPT | Performed by: EMERGENCY MEDICINE

## 2025-05-13 PROCEDURE — 82607 VITAMIN B-12: CPT | Performed by: INTERNAL MEDICINE

## 2025-05-13 PROCEDURE — 85610 PROTHROMBIN TIME: CPT | Performed by: EMERGENCY MEDICINE

## 2025-05-13 PROCEDURE — 250N000011 HC RX IP 250 OP 636: Performed by: EMERGENCY MEDICINE

## 2025-05-13 PROCEDURE — 36415 COLL VENOUS BLD VENIPUNCTURE: CPT | Performed by: EMERGENCY MEDICINE

## 2025-05-13 PROCEDURE — 250N000011 HC RX IP 250 OP 636: Mod: JZ | Performed by: INTERNAL MEDICINE

## 2025-05-13 PROCEDURE — 73552 X-RAY EXAM OF FEMUR 2/>: CPT | Mod: LT

## 2025-05-13 PROCEDURE — 93005 ELECTROCARDIOGRAM TRACING: CPT

## 2025-05-13 RX ORDER — HYDRALAZINE HYDROCHLORIDE 20 MG/ML
10 INJECTION INTRAMUSCULAR; INTRAVENOUS EVERY 4 HOURS PRN
Status: DISCONTINUED | OUTPATIENT
Start: 2025-05-13 | End: 2025-05-19 | Stop reason: HOSPADM

## 2025-05-13 RX ORDER — ACETAMINOPHEN 325 MG/1
975 TABLET ORAL 3 TIMES DAILY
Status: DISCONTINUED | OUTPATIENT
Start: 2025-05-13 | End: 2025-05-14

## 2025-05-13 RX ORDER — OXYCODONE HYDROCHLORIDE 5 MG/1
5 TABLET ORAL EVERY 4 HOURS PRN
Status: DISCONTINUED | OUTPATIENT
Start: 2025-05-13 | End: 2025-05-14

## 2025-05-13 RX ORDER — POLYETHYLENE GLYCOL 3350 17 G/17G
17 POWDER, FOR SOLUTION ORAL 2 TIMES DAILY PRN
Status: DISCONTINUED | OUTPATIENT
Start: 2025-05-13 | End: 2025-05-19 | Stop reason: HOSPADM

## 2025-05-13 RX ORDER — HYDROMORPHONE HYDROCHLORIDE 1 MG/ML
0.5 INJECTION, SOLUTION INTRAMUSCULAR; INTRAVENOUS; SUBCUTANEOUS EVERY 30 MIN PRN
Refills: 0 | Status: COMPLETED | OUTPATIENT
Start: 2025-05-13 | End: 2025-05-13

## 2025-05-13 RX ORDER — NALOXONE HYDROCHLORIDE 0.4 MG/ML
0.4 INJECTION, SOLUTION INTRAMUSCULAR; INTRAVENOUS; SUBCUTANEOUS
Status: DISCONTINUED | OUTPATIENT
Start: 2025-05-13 | End: 2025-05-19 | Stop reason: HOSPADM

## 2025-05-13 RX ORDER — LIDOCAINE 40 MG/G
CREAM TOPICAL
Status: DISCONTINUED | OUTPATIENT
Start: 2025-05-13 | End: 2025-05-14

## 2025-05-13 RX ORDER — AMOXICILLIN 250 MG
2 CAPSULE ORAL 2 TIMES DAILY PRN
Status: DISCONTINUED | OUTPATIENT
Start: 2025-05-13 | End: 2025-05-19 | Stop reason: HOSPADM

## 2025-05-13 RX ORDER — PROCHLORPERAZINE MALEATE 10 MG
10 TABLET ORAL EVERY 6 HOURS PRN
Status: DISCONTINUED | OUTPATIENT
Start: 2025-05-13 | End: 2025-05-14

## 2025-05-13 RX ORDER — NALOXONE HYDROCHLORIDE 0.4 MG/ML
0.2 INJECTION, SOLUTION INTRAMUSCULAR; INTRAVENOUS; SUBCUTANEOUS
Status: DISCONTINUED | OUTPATIENT
Start: 2025-05-13 | End: 2025-05-19 | Stop reason: HOSPADM

## 2025-05-13 RX ORDER — SUCRALFATE 1 G/1
1 TABLET ORAL 4 TIMES DAILY
Status: DISCONTINUED | OUTPATIENT
Start: 2025-05-13 | End: 2025-05-19 | Stop reason: HOSPADM

## 2025-05-13 RX ORDER — PANTOPRAZOLE SODIUM 40 MG/1
40 TABLET, DELAYED RELEASE ORAL 2 TIMES DAILY
Status: DISCONTINUED | OUTPATIENT
Start: 2025-05-13 | End: 2025-05-19 | Stop reason: HOSPADM

## 2025-05-13 RX ORDER — TAMOXIFEN CITRATE 10 MG/1
20 TABLET ORAL DAILY
Status: DISCONTINUED | OUTPATIENT
Start: 2025-05-13 | End: 2025-05-19 | Stop reason: HOSPADM

## 2025-05-13 RX ORDER — ONDANSETRON 2 MG/ML
4 INJECTION INTRAMUSCULAR; INTRAVENOUS EVERY 6 HOURS PRN
Status: DISCONTINUED | OUTPATIENT
Start: 2025-05-13 | End: 2025-05-14

## 2025-05-13 RX ORDER — BISACODYL 10 MG
10 SUPPOSITORY, RECTAL RECTAL DAILY PRN
Status: DISCONTINUED | OUTPATIENT
Start: 2025-05-13 | End: 2025-05-19 | Stop reason: HOSPADM

## 2025-05-13 RX ORDER — HYDRALAZINE HYDROCHLORIDE 10 MG/1
10 TABLET, FILM COATED ORAL EVERY 4 HOURS PRN
Status: DISCONTINUED | OUTPATIENT
Start: 2025-05-13 | End: 2025-05-19 | Stop reason: HOSPADM

## 2025-05-13 RX ORDER — HYDROMORPHONE HCL IN WATER/PF 6 MG/30 ML
0.2 PATIENT CONTROLLED ANALGESIA SYRINGE INTRAVENOUS
Status: DISCONTINUED | OUTPATIENT
Start: 2025-05-13 | End: 2025-05-14

## 2025-05-13 RX ORDER — TAMOXIFEN CITRATE 20 MG/1
20 TABLET ORAL DAILY
COMMUNITY

## 2025-05-13 RX ORDER — HYDROMORPHONE HCL IN WATER/PF 6 MG/30 ML
0.4 PATIENT CONTROLLED ANALGESIA SYRINGE INTRAVENOUS
Status: DISCONTINUED | OUTPATIENT
Start: 2025-05-13 | End: 2025-05-14

## 2025-05-13 RX ORDER — AMOXICILLIN 250 MG
1 CAPSULE ORAL 2 TIMES DAILY PRN
Status: DISCONTINUED | OUTPATIENT
Start: 2025-05-13 | End: 2025-05-19 | Stop reason: HOSPADM

## 2025-05-13 RX ORDER — ONDANSETRON 2 MG/ML
4 INJECTION INTRAMUSCULAR; INTRAVENOUS EVERY 30 MIN PRN
Status: DISCONTINUED | OUTPATIENT
Start: 2025-05-13 | End: 2025-05-13

## 2025-05-13 RX ORDER — LOSARTAN POTASSIUM 25 MG/1
100 TABLET ORAL DAILY
Status: DISCONTINUED | OUTPATIENT
Start: 2025-05-13 | End: 2025-05-19 | Stop reason: HOSPADM

## 2025-05-13 RX ORDER — ONDANSETRON 4 MG/1
4 TABLET, ORALLY DISINTEGRATING ORAL EVERY 6 HOURS PRN
Status: DISCONTINUED | OUTPATIENT
Start: 2025-05-13 | End: 2025-05-14

## 2025-05-13 RX ADMIN — HYDROMORPHONE HYDROCHLORIDE 0.4 MG: 0.2 INJECTION, SOLUTION INTRAMUSCULAR; INTRAVENOUS; SUBCUTANEOUS at 15:02

## 2025-05-13 RX ADMIN — HYDROMORPHONE HYDROCHLORIDE 0.5 MG: 1 INJECTION, SOLUTION INTRAMUSCULAR; INTRAVENOUS; SUBCUTANEOUS at 11:33

## 2025-05-13 RX ADMIN — ACETAMINOPHEN 975 MG: 325 TABLET, FILM COATED ORAL at 21:32

## 2025-05-13 RX ADMIN — SUCRALFATE 1 G: 1 TABLET ORAL at 21:32

## 2025-05-13 RX ADMIN — PANTOPRAZOLE SODIUM 40 MG: 40 TABLET, DELAYED RELEASE ORAL at 20:50

## 2025-05-13 RX ADMIN — OXYCODONE HYDROCHLORIDE 5 MG: 5 TABLET ORAL at 16:16

## 2025-05-13 RX ADMIN — SODIUM CHLORIDE 1000 ML: 0.9 INJECTION, SOLUTION INTRAVENOUS at 06:21

## 2025-05-13 RX ADMIN — OXYCODONE HYDROCHLORIDE 5 MG: 5 TABLET ORAL at 20:50

## 2025-05-13 RX ADMIN — HYDROMORPHONE HYDROCHLORIDE 0.5 MG: 1 INJECTION, SOLUTION INTRAMUSCULAR; INTRAVENOUS; SUBCUTANEOUS at 04:49

## 2025-05-13 RX ADMIN — ONDANSETRON 4 MG: 2 INJECTION, SOLUTION INTRAMUSCULAR; INTRAVENOUS at 03:01

## 2025-05-13 RX ADMIN — HYDROMORPHONE HYDROCHLORIDE 0.4 MG: 0.2 INJECTION, SOLUTION INTRAMUSCULAR; INTRAVENOUS; SUBCUTANEOUS at 18:23

## 2025-05-13 RX ADMIN — HYDROMORPHONE HYDROCHLORIDE 0.5 MG: 1 INJECTION, SOLUTION INTRAMUSCULAR; INTRAVENOUS; SUBCUTANEOUS at 03:00

## 2025-05-13 ASSESSMENT — ACTIVITIES OF DAILY LIVING (ADL)
ADLS_ACUITY_SCORE: 47
ADLS_ACUITY_SCORE: 51
ADLS_ACUITY_SCORE: 47
ADLS_ACUITY_SCORE: 51
ADLS_ACUITY_SCORE: 47
ADLS_ACUITY_SCORE: 51
ADLS_ACUITY_SCORE: 51
ADLS_ACUITY_SCORE: 47
ADLS_ACUITY_SCORE: 51
ADLS_ACUITY_SCORE: 47

## 2025-05-13 ASSESSMENT — COLUMBIA-SUICIDE SEVERITY RATING SCALE - C-SSRS
6. HAVE YOU EVER DONE ANYTHING, STARTED TO DO ANYTHING, OR PREPARED TO DO ANYTHING TO END YOUR LIFE?: NO
2. HAVE YOU ACTUALLY HAD ANY THOUGHTS OF KILLING YOURSELF IN THE PAST MONTH?: NO
1. IN THE PAST MONTH, HAVE YOU WISHED YOU WERE DEAD OR WISHED YOU COULD GO TO SLEEP AND NOT WAKE UP?: NO

## 2025-05-13 NOTE — PLAN OF CARE
Goal Outcome Evaluation:    Denies CP, SOB. TAPS system in place per order. Bedrest. Possible surgery tomorrow. NPO at midnight. Her tamoxifen was not in omnicell so author paged pharm, oncoming Rn aware.

## 2025-05-13 NOTE — ED NOTES
Bed: ED23  Expected date:   Expected time:   Means of arrival:   Comments:  HEMS 447 64F fall, poss fx, fentanyl given eta 0042

## 2025-05-13 NOTE — ED PROVIDER NOTES
"  Emergency Department Note      History of Present Illness     Chief Complaint   Fall and Hip left      HPI   Vandana Gonzalez is a 64 year old female with a history of breast cancer, Klippel Trenaunay disease and hypertension who was brought in by EMS from home for evaluation after a mechanical fall. She reports she tripped over a bathroom rug and fell, landing on her left side. She denies hitting her head or any loss of consciousness. She reports experiencing left hip pain since the fall. She reports she has had a left femur fracture before ad this feels similar to that.  She is not on any blood thinner medications. She has a left  leg vascular disorder called Klippel Trenaunay disease which causes chronic left leg weakness. No new knee pain. No abdominal pain.  No new numbness, tingling, or weakness. She notes chronic purple discoloration of the left lower extremity which is unchanged for many years. She denies any other symptoms.       Independent Historian   None    Review of External Notes   None    Past Medical History     Medical History and Problem List   Hypertension   Breast cancer   Klippel Trenaunay disease    Medications   Pantoprazole   Methylprednisolone   Norco   Tamoxifen   Telmisartan     Surgical History   Breast biopsy   Colonoscopy   EGD  Femur surgery   Ortho   UGI endoscopy   Physical Exam     Patient Vitals for the past 24 hrs:   BP Temp Temp src Pulse Resp SpO2 Height Weight   05/13/25 0530 104/62 -- -- -- -- 97 % -- --   05/13/25 0500 (!) 88/54 -- -- 68 -- 98 % -- --   05/13/25 0430 106/65 -- -- 76 -- 98 % -- --   05/13/25 0057 (!) 141/74 97.5  F (36.4  C) Oral 65 18 99 % 1.753 m (5' 9\") 93 kg (205 lb)     Physical Exam  General: Well appearing, nontoxic.  Resting comfortably  Head:  Scalp, face, and head appear normal, atraumatic non tender to palpation   Eyes:  Pupils are equal, round, reactive to light EOMI, no nystagmus     Conjunctivae non-injected and sclerae white  ENT:    The external " nose is normal    Pinnae are normal. Face is atraumatic   Neck:  Normal range of motion. Cervical spine nontender, no stepoffs     There is no rigidity noted    Trachea is in the midline  CV:  Regular rate and rhythm     Normal S1/S2, no S3/S4    2/6 systolic murmur. No rub. Radial pulses 2+ bilaterally.  Resp:  Lungs are clear and equal bilaterally  There is no tachypnea    No increased work of breathing    No rales, wheezing, or rhonchi  GI:  Abdomen is soft, no rigidity or guarding    No distension, or mass    No tenderness or rebound tenderness   MS:  Normal muscular tone. Bilateral upper extremities and right lower extremity are atraumatic and nontender.  Significant pain with any range of motion of the left hip as well as tenderness over the lateral aspect of the left hip.  No bony pain over the distal thigh/femur, knee, lower leg, ankle or foot.    Symmetric motor strength    Significant asymmetric swelling of the entirety of the left lower extremity with pitting edema of the left foot.  Skin:  Chronic vascular wounds to the anterior left mid shin. Diffuse port-wine skin discoloration of the entirety of the left lower extremity extending up onto the left lower abdominal wall.  No rash or acute skin lesions noted  Neuro:  A&Ox3, GCS 15    CN II - XII intact    Speech clear, fluent, and normal    Strength 5/5 and symmetric in bilateral upper and lower extremities.    SILT throughout.    No ankle clonus    No tremor.     No meningismus   Psych: Normal affect. Appropriate interactions.      Diagnostics     Lab Results   Labs Ordered and Resulted from Time of ED Arrival to Time of ED Departure   BASIC METABOLIC PANEL - Abnormal       Result Value    Sodium 132 (*)     Potassium 3.5      Chloride 98      Carbon Dioxide (CO2) 19 (*)     Anion Gap 15      Urea Nitrogen 6.7 (*)     Creatinine 0.45 (*)     GFR Estimate >90      Calcium 8.7 (*)     Glucose 109 (*)    CBC WITH PLATELETS AND DIFFERENTIAL - Abnormal    WBC  Count 8.5      RBC Count 3.46 (*)     Hemoglobin 9.3 (*)     Hematocrit 29.2 (*)     MCV 84      MCH 26.9      MCHC 31.8      RDW 18.3 (*)     Platelet Count 239      % Neutrophils 81      % Lymphocytes 14      % Monocytes 4      % Eosinophils 1      % Basophils 0      % Immature Granulocytes 0      NRBCs per 100 WBC 0      Absolute Neutrophils 6.8      Absolute Lymphocytes 1.2      Absolute Monocytes 0.3      Absolute Eosinophils 0.1      Absolute Basophils 0.0      Absolute Immature Granulocytes 0.0      Absolute NRBCs 0.0     INR - Normal    INR 1.08      PT 13.7     PARTIAL THROMBOPLASTIN TIME - Normal    aPTT 26     TYPE AND SCREEN, ADULT    ABO/RH(D) O POS      Antibody Screen Negative      SPECIMEN EXPIRATION DATE [id number]     ABO/RH TYPE AND SCREEN       Imaging   XR Pelvis w Hip Left 1 View   Final Result   IMPRESSION: Mildly displaced left intertrochanteric hip fracture. No other fracture or dislocation. Right hip arthroplasty.          EKG   ECG results from 05/13/25   EKG 12-lead, tracing only     Value    Systolic Blood Pressure     Diastolic Blood Pressure     Ventricular Rate 78    Atrial Rate 78    WY Interval 204    QRS Duration 84        QTc 490    P Axis 25    R AXIS 11    T Axis 25    Interpretation ECG      Sinus rhythm  QTcB >= 480 msec  When compared with ECG of 27-Oct-2021 15:27,  No significant change was found  Interepreted by Nik Vazquez MD at 0325        Independent Interpretation   See Emergency Department course below     ED Course      Medications Administered   Medications   HYDROmorphone (PF) (DILAUDID) injection 0.5 mg (0.5 mg Intravenous $Given 5/13/25 6348)   ondansetron (ZOFRAN) injection 4 mg (4 mg Intravenous $Given 5/13/25 0301)   sodium chloride 0.9% BOLUS 1,000 mL (1,000 mLs Intravenous $New Bag 5/13/25 0648)       Procedures   Procedures     Discussion of Management   Admitting Hospitalist, Dr. Hodges    ED Course   ED Course as of 05/13/25 0625   Tue May 13,  2025   0236 I obtained the history and examined the patient as above.    0502 I performed an independent interpretation of the patient's pelvis and left hip radiographs.  There is an intertrochanteric hip fracture on the left.   0530 I spoke with Dr. Hodges from the hospitalist service regarding the patient's presentation, findings here in the ED, and plan of care.    0531 I rechecked and updated the patient.         Additional Documentation  None    Medical Decision Making / Diagnosis     CMS Diagnoses: None    MIPS            None    [facility]   Vandana Gonzalez is a 64 year old female who presents with left hip pain after mechanical fall.  ED evaluation shows evidence of a mildly displaced left intertrochanteric hip fracture.  No neurovascular compromise.  Patient has history of Klippel Trenaunay disease involving the left lower extremity which is chronic and unchanged for her.  No other injuries on head to toe trauma evaluation.  No syncope, LOC or head injury.  Laboratory studies are largely reassuring.  She does have anemia with hemoglobin of 9.3.  This is not significantly different from her baseline.  Given the injury the patient will require admission to the hospital with orthopedic consultation and likely surgical intervention.  The case was discussed with the hospitalist and the patient was admitted in stable condition.    Disposition   The patient was admitted to the hospital.     Diagnosis     ICD-10-CM    1. Closed displaced intertrochanteric fracture of left femur, initial encounter (H)  S72.142A            Scribe Disclosure:  Madina FLORES, am serving as a scribe at 2:08 AM on 5/13/2025 to document services personally performed by Nik Vazquez MD based on my observations and the provider's statements to me.        Nik Vazquez MD  05/13/25 0652

## 2025-05-13 NOTE — ED TRIAGE NOTES
Pt BIBA from home, pt reports she tripped over the bedroom rug and fell onto L side. Per EMS, pt was sitting up upon their arrival. Pt given 50mcg fentanyl for tx. Pt has baseline lymphedema to LLE. Pt has small abrasion to L elbow. Denies hitting head, no LOC. Denies blood thinners.      Triage Assessment (Adult)       Row Name 05/13/25 0052          Triage Assessment    Airway WDL WDL        Respiratory WDL    Respiratory WDL WDL        Cardiac WDL    Cardiac WDL WDL        Peripheral/Neurovascular WDL    Peripheral Neurovascular WDL WDL        Cognitive/Neuro/Behavioral WDL    Cognitive/Neuro/Behavioral WDL WDL

## 2025-05-13 NOTE — CONSULTS
[facility]    Orthopedic Consultation    Vandana Gonzalez MRN# [id number]   Age: 64 year old YOB: 1960     Date of Admission:  5/13/2025    Reason for consult: Intertrochanteric fracture, left       Requesting provider: Steve Hodges MD       Level of consult: Consult, follow and place orders           Assessment and Plan:   Assessment:   Intertrochanteric fracture, left      Plan:   Patient will undergo surgery with Dr Nolasco tomorrow  Patient reports prior femur ORIF, will obtain updated femur films  Patient may eat tonight, will be NPO at midnight  IV fluids/meds as needed  Strict nonweightbearing/bedrest until surgery is complete  Patient has no additional risk factors for DVT, no blood thinners currently, but will require prophylaxis post op  PT/OT/SW consult post operatively           Chief Complaint:   Left hip pain after a fall         History of Present Illness:   History obtained from ED provider note:  Vandana Gonzalez is a 64 year old female with a history of breast cancer, Klippel Trenaunay disease and hypertension who was brought in by EMS from home for evaluation after a mechanical fall. She reports she tripped over a bathroom rug and fell, landing on her left side. She denies hitting her head or any loss of consciousness. She reports experiencing left hip pain since the fall. She reports she has had a left femur fracture before ad this feels similar to that.  She is not on any blood thinner medications. She has a left  leg vascular disorder called Klippel Trenaunay disease which causes chronic left leg weakness. No new knee pain. No abdominal pain.  No new numbness, tingling, or weakness. She notes chronic purple discoloration of the left lower extremity which is unchanged for many years. She denies any other symptoms       Additional history obtained from the patient:  Patient lives in a single family house with her .  She uses a cane to ambulate in her house  and a cane/roller device to ambulate out of the house.  She has a history of a right total hip arthroplasty and a prior left femur fracture ORIF.  She has a history of Klippel Trenaunay disease which affects the left lower extremity.  She reports skin changes which have been present since birth.  She is not currently on any blood thinners.            Past Medical History:     Past Medical History:   Diagnosis Date    Closed fracture of unspecified part of neck of femur 2002    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, unspecified site     large angioma adomen    Hypertensive disorder     Klippel Trenaunay disease     AVM left leg    Phlebitis and thrombophlebitis of other deep vessels of lower extremities 2002    AVM left leg    Stasis ulcer of lower extremity (H) 2012    Left-recurrent             Past Surgical History:     Past Surgical History:   Procedure Laterality Date    ARTHROPLASTY HIP ANTERIOR Right 10/28/2021    Procedure: ARTHROPLASTY, RIGHT TOTAL HIP, DIRECT ANTERIOR APPROACH;  Surgeon: Jimmy Pena MD;  Location:  OR    BIOPSY OF BREAST, INCISIONAL  2009    phyllodes tumor left    BREAST SURGERY  2009    COLONOSCOPY  3-    ESOPHAGOSCOPY, GASTROSCOPY, DUODENOSCOPY (EGD), COMBINED N/A 10/07/2021    Procedure: ESOPHAGOGASTRODUODENOSCOPY (EGD);  Surgeon: Romeo Chavez MD;  Location:  GI    FEMUR SURGERY      2002 left side     ORTHOPEDIC SURGERY  2002    repair broken femur    ZZC LIGATN FEMORAL VEIN      multiple vein strippings left    ZZ UGI ENDOSCOPY, SIMPLE EXAM  2002    esophageal stricture             Social History:     Social History     Tobacco Use    Smoking status: Former     Current packs/day: 0.00     Average packs/day: 0.3 packs/day for 2.0 years (0.5 ttl pk-yrs)     Types: Cigarettes     Start date: 10/1/1990     Quit date: 10/1/1992     Years since quittin.6    Smokeless tobacco: Never    Tobacco comments:     Quit  1994   Substance Use Topics    Alcohol use: Yes     Comment: 2-3 beers daily             Family History:     Family History   Problem Relation Age of Onset    Cancer - colorectal Father     Diabetes Brother              Immunizations:     VACCINE / DOSE   Diptheria   DPT   DTAP   HBIG   Hepatitis A   Hepatitis B   HIB   Influenza   Measles   Meningococcal   MMR   Mumps   Pneumococcal   Polio   Rubella   Small Pox   TDAP   Varicella   Zoster             Allergies:     Allergies   Allergen Reactions    Contrast Dye Hives    No Known Allergies              Medications:     Current Facility-Administered Medications   Medication Dose Route Frequency Provider Last Rate Last Admin    acetaminophen (TYLENOL) tablet 975 mg  975 mg Oral TID Steve Hodges MD        benzocaine-menthol (CHLORASEPTIC) 6-10 MG lozenge 1 lozenge  1 lozenge Buccal Q1H PRN Steve Hodges MD        bisacodyl (DULCOLAX) suppository 10 mg  10 mg Rectal Daily PRN Steve Hodges MD        hydrALAZINE (APRESOLINE) tablet 10 mg  10 mg Oral Q4H PRN Steve Hodges MD        Or    hydrALAZINE (APRESOLINE) injection 10 mg  10 mg Intravenous Q4H PRN Steve Hodges MD        HYDROmorphone (DILAUDID) injection 0.2 mg  0.2 mg Intravenous Q2H PRN Steve Hodges MD        HYDROmorphone (DILAUDID) injection 0.4 mg  0.4 mg Intravenous Q2H PRN Steve Hodges MD   0.4 mg at 05/13/25 1502    lidocaine (LMX4) cream   Topical Q1H PRN Steve Hodges MD        lidocaine 1 % 0.1-1 mL  0.1-1 mL Other Q1H PRN Steve Hodges MD        [Held by provider] losartan (COZAAR) tablet 100 mg  100 mg Oral Daily Maddi Gomez MD        melatonin tablet 5 mg  5 mg Oral At Bedtime PRN Steve Hodges MD        naloxone (NARCAN) injection 0.2 mg  0.2 mg Intravenous Q2 Min PRN Maddi Gomez MD        Or    naloxone (NARCAN) injection 0.4 mg  0.4 mg Intravenous Q2 Min PRN Maddi Gomez MD         Or    naloxone (NARCAN) injection 0.2 mg  0.2 mg Intramuscular Q2 Min PRN Maddi Gomez MD        Or    naloxone (NARCAN) injection 0.4 mg  0.4 mg Intramuscular Q2 Min PRN Maddi Gomez MD        ondansetron (ZOFRAN ODT) ODT tab 4 mg  4 mg Oral Q6H PRN Steve Hodges MD        Or    ondansetron (ZOFRAN) injection 4 mg  4 mg Intravenous Q6H PRSteve Montiel MD        oxyCODONE (ROXICODONE) tablet 5 mg  5 mg Oral Q4H PRN Steve Hodges MD   5 mg at 05/13/25 1616    oxyCODONE IR (ROXICODONE) half-tab 2.5 mg  2.5 mg Oral Q4H PRSteve Montiel MD        pantoprazole (PROTONIX) EC tablet 40 mg  40 mg Oral BID Maddi Gomez MD        polyethylene glycol (MIRALAX) Packet 17 g  17 g Oral BID PRSteve Montiel MD        prochlorperazine (COMPAZINE) injection 10 mg  10 mg Intravenous Q6H PRSteve Montiel MD        Or    prochlorperazine (COMPAZINE) tablet 10 mg  10 mg Oral Q6H PRSteve Montiel MD        senna-docusate (SENOKOT-S/PERICOLACE) 8.6-50 MG per tablet 1 tablet  1 tablet Oral BID PRSteve Montiel MD        Or    senna-docusate (SENOKOT-S/PERICOLACE) 8.6-50 MG per tablet 2 tablet  2 tablet Oral BID PRSteve Montiel MD        sodium chloride (PF) 0.9% PF flush 3 mL  3 mL Intracatheter Q8H Steve Hodges MD   3 mL at 05/13/25 1503    sodium chloride (PF) 0.9% PF flush 3 mL  3 mL Intracatheter q1 min prSteve Montiel MD        sucralfate (CARAFATE) tablet 1 g  1 g Oral 4x Daily Maddi Gomez MD        tamoxifen (NOLVADEX) tablet 20 mg  20 mg Oral Daily Maddi Gomez MD                 Review of Systems:   ROS:  10 point ROS neg other than the symptoms noted above in the HPI.            Physical Exam:   All vitals have been reviewed  Patient Vitals for the past 24 hrs:   BP Temp Temp src Pulse Resp SpO2 Height Weight   05/13/25 1449 (!) 147/71 98  F (36.7  C) Oral 78 -- 94 % -- --  "  05/13/25 0700 115/65 -- -- 72 -- 94 % -- --   05/13/25 0630 -- -- -- -- -- 96 % -- --   05/13/25 0600 108/61 -- -- 72 -- 97 % -- --   05/13/25 0530 104/62 -- -- -- -- 97 % -- --   05/13/25 0500 (!) 88/54 -- -- 68 -- 98 % -- --   05/13/25 0430 106/65 -- -- 76 -- 98 % -- --   05/13/25 0057 (!) 141/74 97.5  F (36.4  C) Oral 65 18 99 % 1.753 m (5' 9\") 93 kg (205 lb)     No intake or output data in the 24 hours ending 05/13/25 1654      Physical Exam   Temp: 98  F (36.7  C) Temp src: Oral BP: (!) 147/71 Pulse: 78   Resp: 18 SpO2: 94 % O2 Device: None (Room air)    Vital Signs with Ranges  Temp:  [97.5  F (36.4  C)-98  F (36.7  C)] 98  F (36.7  C)  Pulse:  [65-78] 78  Resp:  [18] 18  BP: ()/(54-74) 147/71  SpO2:  [94 %-99 %] 94 %  205 lbs 0 oz    Constitutional: Pleasant, alert, appropriate, following commands.  HEENT: Head atraumatic normocephalic.  Respiratory: Unlabored breathing, no audible wheeze  Cardiovascular: Regular rate and rhythm per pulses  GI: Abdomen non-distended.  Lymph/Hematologic: No lymphadenopathy in areas examined  Genitourinary:  No gutierrez  Skin: No rashes, no cyanosis, no edema.  Musculoskeletal: Patient is lying supine on the bed with legs extended.  Her skin around the left hip is intact, there are no lesions, abrasions or lacerations present.  She does have an abrasion on the anterolateral tibia.  Her skin has a purplish mottled appearance, with areas of pronounced white tissue, she reports this is her baseline.  She has moderate swelling of the left lower extremity.  Her thigh compartments are soft and compressible.  Her calves are soft and non-tender to palpation.  She can plantar and dorsiflex the ankle.  Dorsalis pedis pulse is present.  Sensation is intact throughout the lower extremity.  Toes are warm and well perfused.  Neurologic: normal without focal findings, mental status, speech normal, alert and oriented x iii  Neuropsychiatric: calm, but appropriately concerned            " Data:   All laboratory data reviewed  Results for orders placed or performed during the hospital encounter of 05/13/25   XR Pelvis w Hip Left 1 View     Status: None    Narrative    EXAM: XR PELVIS AND HIP LEFT 1 VIEW  LOCATION: [facility]  DATE: 5/13/2025    INDICATION: Pain  COMPARISON: None.      Impression    IMPRESSION: Mildly displaced left intertrochanteric hip fracture. No other fracture or dislocation. Right hip arthroplasty.   INR     Status: Normal   Result Value Ref Range    INR 1.08 0.85 - 1.15    PT 13.7 11.8 - 14.8 Seconds   Partial thromboplastin time     Status: Normal   Result Value Ref Range    aPTT 26 22 - 38 Seconds   Basic metabolic panel     Status: Abnormal   Result Value Ref Range    Sodium 132 (L) 135 - 145 mmol/L    Potassium 3.5 3.4 - 5.3 mmol/L    Chloride 98 98 - 107 mmol/L    Carbon Dioxide (CO2) 19 (L) 22 - 29 mmol/L    Anion Gap 15 7 - 15 mmol/L    Urea Nitrogen 6.7 (L) 8.0 - 23.0 mg/dL    Creatinine 0.45 (L) 0.51 - 0.95 mg/dL    GFR Estimate >90 >60 mL/min/1.73m2    Calcium 8.7 (L) 8.8 - 10.4 mg/dL    Glucose 109 (H) 70 - 99 mg/dL   CBC with platelets and differential     Status: Abnormal   Result Value Ref Range    WBC Count 8.5 4.0 - 11.0 10e3/uL    RBC Count 3.46 (L) 3.80 - 5.20 10e6/uL    Hemoglobin 9.3 (L) 11.7 - 15.7 g/dL    Hematocrit 29.2 (L) 35.0 - 47.0 %    MCV 84 78 - 100 fL    MCH 26.9 26.5 - 33.0 pg    MCHC 31.8 31.5 - 36.5 g/dL    RDW 18.3 (H) 10.0 - 15.0 %    Platelet Count 239 150 - 450 10e3/uL    % Neutrophils 81 %    % Lymphocytes 14 %    % Monocytes 4 %    % Eosinophils 1 %    % Basophils 0 %    % Immature Granulocytes 0 %    NRBCs per 100 WBC 0 <1 /100    Absolute Neutrophils 6.8 1.6 - 8.3 10e3/uL    Absolute Lymphocytes 1.2 0.8 - 5.3 10e3/uL    Absolute Monocytes 0.3 0.0 - 1.3 10e3/uL    Absolute Eosinophils 0.1 0.0 - 0.7 10e3/uL    Absolute Basophils 0.0 0.0 - 0.2 10e3/uL    Absolute Immature Granulocytes 0.0 <=0.4 10e3/uL    Absolute  NRBCs 0.0 10e3/uL   EKG 12-lead, tracing only     Status: None   Result Value Ref Range    Systolic Blood Pressure  mmHg    Diastolic Blood Pressure  mmHg    Ventricular Rate 78 BPM    Atrial Rate 78 BPM    WI Interval 204 ms    QRS Duration 84 ms     ms    QTc 490 ms    P Axis 25 degrees    R AXIS 11 degrees    T Axis 25 degrees    Interpretation ECG       Sinus rhythm  QTcB >= 480 msec  Abnormal ECG  When compared with ECG of 27-Oct-2021 15:27,  No significant change was found  Confirmed by GENERATED REPORT, COMPUTER ([id number]),  TY FIELDS ([id number]) on 5/13/2025 8:01:11 AM     Adult Type and Screen     Status: None   Result Value Ref Range    ABO/RH(D) O POS     Antibody Screen Negative Negative    SPECIMEN EXPIRATION DATE [id number]    CBC with platelets differential     Status: Abnormal    Narrative    The following orders were created for panel order CBC with platelets differential.  Procedure                               Abnormality         Status                     ---------                               -----------         ------                     CBC with platelets and ...[[id number]]  Abnormal            Final result                 Please view results for these tests on the individual orders.   ABO/Rh type and screen     Status: None    Narrative    The following orders were created for panel order ABO/Rh type and screen.  Procedure                               Abnormality         Status                     ---------                               -----------         ------                     Adult Type and Screen[[id number]]                           Final result                 Please view results for these tests on the individual orders.          Attestation:  I have reviewed today's vital signs, notes, medications, labs and imaging with Dr. Nolasco.  Amount of time performed on this consult: 45 minutes.    Kelsy Wolff PA-C  [facility]

## 2025-05-13 NOTE — PHARMACY-ADMISSION MEDICATION HISTORY
Pharmacist Admission Medication History    Admission medication history is complete. The information provided in this note is only as accurate as the sources available at the time of the update.    Information Source(s): Patient via in-person    Pertinent Information: n/a    Changes made to PTA medication list:  Added: tamoxifen  Deleted: None  Changed: None    Allergies reviewed with patient and updates made in EHR: no    Medication History Completed By: Blanca Davila [facility] 5/13/2025 9:59 AM    PTA Med List   Medication Sig Last Dose/Taking    pantoprazole (PROTONIX) 40 MG EC tablet Take 1 tablet (40 mg) by mouth 2 times daily 5/12/2025    sucralfate (CARAFATE) 1 GM tablet Take 1 g by mouth 4 times daily. 5/12/2025    tamoxifen (NOLVADEX) 20 MG tablet Take 20 mg by mouth daily. 5/12/2025    telmisartan (MICARDIS) 80 MG tablet TAKE 1 TABLET(80 MG) BY MOUTH DAILY 5/12/2025

## 2025-05-13 NOTE — H&P
[facility]    History and Physical - Hospitalist Service       Date of Admission:  5/13/2025    Assessment & Plan   Vandana Gonzalez is a 64 year old female with past medical history significant for osteoporosis, breast cancer who presents with fall and left hip pain. Admitted on 5/13/2025.     Acute left intertrochanteric femur fracture, mildly displaced  Mechanical fall  Osteoporosis   *Presents with fall while walking in the dark   *XR with mildly displaced left intertrochanteric hip fracture  *RCRI 0. Denies any chest pain or shortness of breath.   - Orthopedic surgery consulted  - Bedrest   - NPO   - Scheduled acetaminophen   - Oxycodone PO PRN, hydromorphone IV PRN      Hyponatremia  Sodium 132. Reports intake normal recently. Possible excess ADH from pain. 1L normal saline given in ED.  - BMP in AM     Acute on chronic anemia  Hgb 9.3 g/dl, last hgb per [facility] 10.7 g/dl 11/7/24.   - Monitor CBC post-operatively     Klippel Trenaunay Syndrome  Chronic lymphedema, L>R  Chronic left lower extremity wound, POA  Chronic port wine discoloration left leg with lymphedema and chronic ulceration   - Wound RN consulted     Breast cancer  Follows with  oncology. Treated with lumpectomy, radiation, tamoxifen.   - Continue prior to admission tamoxifen when dose confirmed by pharmacy     Hypertension  - Hold prior to admission telmisartan pending surgery    GERD  - Continue prior to admission PPI when dose confirmed by pharmacy     Alcohol overuse  Reports drinking 2-3 beers daily. Denies any history of withdrawal.  - Discussed recommended alcohol limits  - Monitor clinically for any signs of withdrawal        Diet: NPO for Medical/Clinical Reasons Except for: Meds    DVT Prophylaxis: Pneumatic Compression Devices  Lemnos Catheter: Not present  Code Status: Full code     Disposition Plan   Admit to inpatient.      Medically Ready for Discharge: Anticipated in 2-4 Days       Entered: Steve MENDEZ  "MD Tracie 05/13/2025, 5:05 AM     The patient's care was discussed with the ED provider, patient      Medical Decision Making       67 MINUTES SPENT BY ME on the date of service doing chart review, history, exam, documentation & further activities per the note.      Clinically Significant Risk Factors Present on Admission         # Hyponatremia: Lowest Na = 132 mmol/L in last 2 days, will monitor as appropriate           # Hypertension: Noted on problem list      # Anemia: based on hgb <11       # Obesity: Estimated body mass index is 30.27 kg/m  as calculated from the following:    Height as of this encounter: 1.753 m (5' 9\").    Weight as of this encounter: 93 kg (205 lb).                   Steve Hodges MD  [facility]    ______________________________________________________________________    Chief Complaint   Fall, left hip pain     History of Present Illness   Vandana Gonzalez is a 64 year old female who presents with the above chief complaint.    History is obtained from the patient, discussion with ED provider and review of medical record. The patient reports she got up from bed in the dark as she did not want to turn the lights on to wake her . She typically ambulates with a cane, leaned it up against a dresser to open the drawer, and either tripped or possibly her left leg gave out falling to the floor on her left hip with subsequent pain. Her  awoke and called EMS and she was brought to the ED for further evaluation. She denies any loss of consciousness. Denies any chest pain or shortness of breath. Denies any prior issues with anesthesia. She has chronic discoloration and swelling of her left leg from Klippel Trenaunay Syndrome which is at baseline    In the Emergency Department, the patient was seen by Dr. Vazquez, with whom I discussed the patient's presenting symptoms and emergency department course.  Initial vital signs were a temperature of 97.5F, HR 65, BP " 141/74, RR 18, SpO2 99% on room air. Pertinent work-up as noted in A&P. Hospitalists were contacted for admission to the hospital.     Past Medical History    I have reviewed this patient's medical history and updated it with pertinent information if needed.   Past Medical History:   Diagnosis Date    Closed fracture of unspecified part of neck of femur     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, unspecified site     large angioma adomen    Hypertensive disorder     Klippel Trenaunay disease 2002    AVM left leg    Phlebitis and thrombophlebitis of other deep vessels of lower extremities 6-2002    AVM left leg    Stasis ulcer of lower extremity (H) 2012    Left-recurrent       Past Surgical History   I have reviewed this patient's surgical history and updated it with pertinent information if needed.  Past Surgical History:   Procedure Laterality Date    ARTHROPLASTY HIP ANTERIOR Right 10/28/2021    Procedure: ARTHROPLASTY, RIGHT TOTAL HIP, DIRECT ANTERIOR APPROACH;  Surgeon: Jimmy Pena MD;  Location:  OR    BIOPSY OF BREAST, INCISIONAL  07/2009    phyllodes tumor left    BREAST SURGERY  07/2009    COLONOSCOPY  3-2022    ESOPHAGOSCOPY, GASTROSCOPY, DUODENOSCOPY (EGD), COMBINED N/A 10/07/2021    Procedure: ESOPHAGOGASTRODUODENOSCOPY (EGD);  Surgeon: Romeo Chavez MD;  Location:  GI    FEMUR SURGERY      2002 left side     ORTHOPEDIC SURGERY  11/2002    repair broken femur    ZZC LIGATN FEMORAL VEIN      multiple vein strippings left    [facility] UGI ENDOSCOPY, SIMPLE EXAM  04/2002    esophageal stricture         Social History   I have reviewed this patient's social history and updated it with pertinent information if needed.  Social History     Tobacco Use    Smoking status: Former     Current packs/day: 0.00     Average packs/day: 0.3 packs/day for 2.0 years (0.5 ttl pk-yrs)     Types: Cigarettes     Start date: 10/1/1990     Quit date: 10/1/1992     Years since quitting:  32.6    Smokeless tobacco: Never    Tobacco comments:     Quit 1994   Vaping Use    Vaping status: Never Used   Substance Use Topics    Alcohol use: Yes     Comment: couple beers per day    Drug use: No        Family History   I have reviewed this patient's family history and updated it with pertinent information if needed.   Family History   Problem Relation Age of Onset    Cancer - colorectal Father     Diabetes Brother         Prior to Admission Medications  - Pending pharmacy reconciliation   Prior to Admission Medications   Prescriptions Last Dose Informant Patient Reported? Taking?   COMPRESSION STOCKINGS   No No   Sig: For DAILY USE 40-50 mmhg compression. Open toe thigh high   pantoprazole (PROTONIX) 40 MG EC tablet  Self No No   Sig: Take 1 tablet (40 mg) by mouth 2 times daily   sucralfate (CARAFATE) 1 GM tablet   Yes No   telmisartan (MICARDIS) 80 MG tablet   No No   Sig: TAKE 1 TABLET(80 MG) BY MOUTH DAILY      Facility-Administered Medications: None     Allergies   Allergies   Allergen Reactions    Contrast Dye Hives    No Known Allergies        Physical Exam   Vital Signs: Temp: 97.5  F (36.4  C) Temp src: Oral BP: (!) 141/74 Pulse: 65   Resp: 18 SpO2: 99 % O2 Device: None (Room air)    Weight: 205 lbs 0 oz    Constitutional: Well-appearing, NAD  Respiratory: Clear to auscultation bilaterally, good air movement, normal effort   Cardiovascular: RRR, no m/r/g. Bilateral lower extremity edema L >> R  GI: Soft, non-tender, non-distended. No rebound tenderness or guarding.    Skin: Warm, dry. Port wine discoloration throughout left lower extremity. Wound left lateral shin.   Neurologic: Alert. Responding to questions appropriately. Following commands.  Psychiatric: Normal affect, appropriate      Data   Data reviewed today: I reviewed all medications, new labs and imaging results over the last 24 hours. I personally reviewed the EKG tracing showing sinus rhythm, QTc 490 ms.    Recent Labs   Lab  05/13/25  0300   WBC 8.5   HGB 9.3*   MCV 84      INR 1.08   *   POTASSIUM 3.5   CHLORIDE 98   CO2 19*   BUN 6.7*   CR 0.45*   ANIONGAP 15   MATTEO 8.7*   *       Recent Results (from the past 24 hours)   XR Pelvis w Hip Left 1 View    Narrative    EXAM: XR PELVIS AND HIP LEFT 1 VIEW  LOCATION: [facility]  DATE: 5/13/2025    INDICATION: Pain  COMPARISON: None.      Impression    IMPRESSION: Mildly displaced left intertrochanteric hip fracture. No other fracture or dislocation. Right hip arthroplasty.

## 2025-05-13 NOTE — PROGRESS NOTES
[facility]    Medicine Progress Note - Hospitalist Service    Date of Admission:  5/13/2025    Assessment & Plan   Vandana Gonzalez is a 64 year old female with past medical history significant for osteoporosis, breast cancer who presents with fall and left hip pain. Admitted on 5/13/2025.     Acute left intertrochanteric femur fracture, mildly displaced  Mechanical fall  Osteoporosis      *L hip XRay 5/12/25 =    mildly displaced left intertrochanteric hip fracture  *RCRI 0. Denies any chest pain or shortness of breath.   Can do work of 4+ mets at baseline.   EKG 5/12/25 is NSR  - Orthopedic surgery consulted  - Bedrest   - NPO   - Scheduled acetaminophen   - Oxycodone PO PRN, hydromorphone IV PRN      Hyponatremia  Sodium 132. Reports intake normal recently. Possible excess ADH from pain. 1L normal saline given in ED.  - BMP in AM     Acute on chronic anemia  H/o esophageal ulcers  H/o Low B12  Hgb 9.3 g/dl, last hgb per CareEverywhere 10.7 g/dl 11/7/24.   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   Chronic lymphedema, L>R  Chronic left lower extremity wound, POA  Chronic port wine discoloration left leg with lymphedema and chronic ulceration   - Wound RN consulted     Breast cancer  -Right breast Invasive Ductal Carcinoma ER positive, [place] negative, HER2 negative   Stage IA (pT1b, pN0(sn), cM0, G2, Oncotype DX score: 26)   - R lumpectomy 11/2024,  status post radiation 1/2025  -Follows with  oncology. Treated with lumpectomy, radiation, tamoxifen.   - Continue prior to admission tamoxifen      Hypertension  - Hold prior to admission telmisartan pending surgery    GERD  -  "Continue prior to admission PPI      Alcohol overuse  Reports drinking 2-3 beers daily. Denies any history of withdrawal.  - Discussed recommended alcohol limits  - Monitor clinically for any signs of withdrawal           Diet: NPO for Procedure/Surgery per Anesthesia Guidelines Except for: Meds; Clear liquids before procedure/surgery: ADULT (Age GREATER than or Equal to 18 years) - Clear liquids 2 hours before procedure/surgery    DVT Prophylaxis: Pneumatic Compression Devices  Lemons Catheter: Not present  Lines: None     Cardiac Monitoring: None  Code Status: Full Code      Clinically Significant Risk Factors Present on Admission         # Hyponatremia: Lowest Na = 132 mmol/L in last 2 days, will monitor as appropriate           # Hypertension: Noted on problem list      # Anemia: based on hgb <11       # Obesity: Estimated body mass index is 30.27 kg/m  as calculated from the following:    Height as of this encounter: 1.753 m (5' 9\").    Weight as of this encounter: 93 kg (205 lb).              Social Drivers of Health    Physical Activity: Patient Declined (10/4/2024)    Exercise Vital Sign     Days of Exercise per Week: Patient declined     Minutes of Exercise per Session: Patient declined   Stress: Stress Concern Present (10/4/2024)    [employer] [place] [employer] - Occupational Stress Questionnaire     Feeling of Stress : To some extent   Social Connections: Unknown (10/4/2024)    Social Connection and Isolation Panel [NHANES]     Frequency of Social Gatherings with Friends and Family: Patient declined          Disposition Plan     Medically Ready for Discharge: Anticipated in 2-4 Days             Maddi Gomez MD  Hospitalist Service  [facility]  Securely message with [employer] (more info)  Text page via [employer] Paging/Directory   ______________________________________________________________________    Interval History   Pain controlled on current regimen, rest.   No " new complaints.   At baseline she reports she is fairly active, can do common housework.  Limited by L leg edema and h/o L leg surgeries.      Physical Exam   Vital Signs: Temp: 98  F (36.7  C) Temp src: Oral BP: (!) 147/71 Pulse: 78   Resp: 18 SpO2: 94 % O2 Device: None (Room air)    Weight: 205 lbs 0 oz  Constitutional: Well-appearing, NAD  Respiratory: Clear to auscultation bilaterally, good air movement, normal effort   Cardiovascular: RRR, no m/r/g. Bilateral lower extremity edema L >> R  GI: Soft, non-tender, non-distended. No rebound tenderness or guarding.    Skin: Warm, dry. Port wine discoloration throughout left lower extremity. Wound left lateral shin.   Neurologic: Alert. Responding to questions appropriately. Following commands.  MS:   DC has chronic edema and multiple surgical wounds from previous vein surgeries and a major surgery for a L femur fracture in past.    Psychiatric: Normal affect, appropriate       Medical Decision Making       45 MINUTES SPENT BY ME on the date of service doing chart review, history, exam, documentation & further activities per the note.      Data     I have personally reviewed the following data over the past 24 hrs:    8.5  \   9.3 (L)   / 239     132 (L) 98 6.7 (L) /  109 (H)   3.5 19 (L) 0.45 (L) \     INR:  1.08 PTT:  26   D-dimer:  N/A Fibrinogen:  N/A       Imaging results reviewed over the past 24 hrs:   Recent Results (from the past 24 hours)   XR Pelvis w Hip Left 1 View    Narrative    EXAM: XR PELVIS AND HIP LEFT 1 VIEW  LOCATION: [facility]  DATE: 5/13/2025    INDICATION: Pain  COMPARISON: None.      Impression    IMPRESSION: Mildly displaced left intertrochanteric hip fracture. No other fracture or dislocation. Right hip arthroplasty.

## 2025-05-13 NOTE — PROGRESS NOTES
RECEIVING UNIT ED HANDOFF REVIEW    ED Nurse Handoff Report was reviewed by: Radha Dial on May 13, 2025 at 2:40 PM

## 2025-05-14 ENCOUNTER — APPOINTMENT (OUTPATIENT)
Dept: GENERAL RADIOLOGY | Facility: CLINIC | Age: 65
DRG: 481 | End: 2025-05-14
Attending: ORTHOPAEDIC SURGERY
Payer: COMMERCIAL

## 2025-05-14 ENCOUNTER — ANESTHESIA (OUTPATIENT)
Dept: SURGERY | Facility: CLINIC | Age: 65
End: 2025-05-14
Payer: COMMERCIAL

## 2025-05-14 ENCOUNTER — ANESTHESIA EVENT (OUTPATIENT)
Dept: SURGERY | Facility: CLINIC | Age: 65
End: 2025-05-14
Payer: COMMERCIAL

## 2025-05-14 LAB
ANION GAP SERPL CALCULATED.3IONS-SCNC: 13 MMOL/L (ref 7–15)
BUN SERPL-MCNC: 5.3 MG/DL (ref 8–23)
CALCIUM SERPL-MCNC: 8.6 MG/DL (ref 8.8–10.4)
CHLORIDE SERPL-SCNC: 103 MMOL/L (ref 98–107)
CREAT SERPL-MCNC: 0.44 MG/DL (ref 0.51–0.95)
EGFRCR SERPLBLD CKD-EPI 2021: >90 ML/MIN/1.73M2
ERYTHROCYTE [DISTWIDTH] IN BLOOD BY AUTOMATED COUNT: 18.6 % (ref 10–15)
FOLATE SERPL-MCNC: 33.1 NG/ML (ref 4.6–34.8)
GLUCOSE SERPL-MCNC: 109 MG/DL (ref 70–99)
HCO3 SERPL-SCNC: 21 MMOL/L (ref 22–29)
HCT VFR BLD AUTO: 30.1 % (ref 35–47)
HGB BLD-MCNC: 8.5 G/DL (ref 11.7–15.7)
HGB BLD-MCNC: 9.3 G/DL (ref 11.7–15.7)
MCH RBC QN AUTO: 27 PG (ref 26.5–33)
MCHC RBC AUTO-ENTMCNC: 30.9 G/DL (ref 31.5–36.5)
MCV RBC AUTO: 87 FL (ref 78–100)
MCV RBC AUTO: 88 FL (ref 78–100)
PLATELET # BLD AUTO: 202 10E3/UL (ref 150–450)
POTASSIUM SERPL-SCNC: 3.5 MMOL/L (ref 3.4–5.3)
RBC # BLD AUTO: 3.45 10E6/UL (ref 3.8–5.2)
SODIUM SERPL-SCNC: 137 MMOL/L (ref 135–145)
VIT B12 SERPL-MCNC: 962 PG/ML (ref 232–1245)
WBC # BLD AUTO: 5.4 10E3/UL (ref 4–11)

## 2025-05-14 PROCEDURE — 258N000003 HC RX IP 258 OP 636: Performed by: ANESTHESIOLOGY

## 2025-05-14 PROCEDURE — 250N000009 HC RX 250: Performed by: ANESTHESIOLOGY

## 2025-05-14 PROCEDURE — C1769 GUIDE WIRE: HCPCS | Performed by: ORTHOPAEDIC SURGERY

## 2025-05-14 PROCEDURE — 85027 COMPLETE CBC AUTOMATED: CPT | Performed by: INTERNAL MEDICINE

## 2025-05-14 PROCEDURE — 250N000013 HC RX MED GY IP 250 OP 250 PS 637: Performed by: INTERNAL MEDICINE

## 2025-05-14 PROCEDURE — 250N000013 HC RX MED GY IP 250 OP 250 PS 637

## 2025-05-14 PROCEDURE — 370N000017 HC ANESTHESIA TECHNICAL FEE, PER MIN: Performed by: ORTHOPAEDIC SURGERY

## 2025-05-14 PROCEDURE — 272N000001 HC OR GENERAL SUPPLY STERILE: Performed by: ORTHOPAEDIC SURGERY

## 2025-05-14 PROCEDURE — 250N000025 HC SEVOFLURANE, PER MIN: Performed by: ORTHOPAEDIC SURGERY

## 2025-05-14 PROCEDURE — 250N000011 HC RX IP 250 OP 636

## 2025-05-14 PROCEDURE — 82746 ASSAY OF FOLIC ACID SERUM: CPT | Performed by: INTERNAL MEDICINE

## 2025-05-14 PROCEDURE — 99232 SBSQ HOSP IP/OBS MODERATE 35: CPT | Performed by: INTERNAL MEDICINE

## 2025-05-14 PROCEDURE — 0QS706Z REPOSITION LEFT UPPER FEMUR WITH INTRAMEDULLARY INTERNAL FIXATION DEVICE, OPEN APPROACH: ICD-10-PCS | Performed by: ORTHOPAEDIC SURGERY

## 2025-05-14 PROCEDURE — 36415 COLL VENOUS BLD VENIPUNCTURE: CPT

## 2025-05-14 PROCEDURE — 36415 COLL VENOUS BLD VENIPUNCTURE: CPT | Performed by: INTERNAL MEDICINE

## 2025-05-14 PROCEDURE — 250N000011 HC RX IP 250 OP 636: Mod: JZ | Performed by: INTERNAL MEDICINE

## 2025-05-14 PROCEDURE — P9045 ALBUMIN (HUMAN), 5%, 250 ML: HCPCS | Performed by: ANESTHESIOLOGY

## 2025-05-14 PROCEDURE — G0463 HOSPITAL OUTPT CLINIC VISIT: HCPCS

## 2025-05-14 PROCEDURE — 120N000001 HC R&B MED SURG/OB

## 2025-05-14 PROCEDURE — 250N000011 HC RX IP 250 OP 636: Mod: JZ | Performed by: ANESTHESIOLOGY

## 2025-05-14 PROCEDURE — 0QPC04Z REMOVAL OF INTERNAL FIXATION DEVICE FROM LEFT LOWER FEMUR, OPEN APPROACH: ICD-10-PCS | Performed by: ORTHOPAEDIC SURGERY

## 2025-05-14 PROCEDURE — 80048 BASIC METABOLIC PNL TOTAL CA: CPT | Performed by: INTERNAL MEDICINE

## 2025-05-14 PROCEDURE — 250N000011 HC RX IP 250 OP 636: Performed by: ANESTHESIOLOGY

## 2025-05-14 PROCEDURE — 85018 HEMOGLOBIN: CPT

## 2025-05-14 PROCEDURE — 999N000141 HC STATISTIC PRE-PROCEDURE NURSING ASSESSMENT: Performed by: ORTHOPAEDIC SURGERY

## 2025-05-14 PROCEDURE — 250N000011 HC RX IP 250 OP 636: Performed by: PHYSICIAN ASSISTANT

## 2025-05-14 PROCEDURE — 250N000009 HC RX 250: Performed by: ORTHOPAEDIC SURGERY

## 2025-05-14 PROCEDURE — 710N000009 HC RECOVERY PHASE 1, LEVEL 1, PER MIN: Performed by: ORTHOPAEDIC SURGERY

## 2025-05-14 PROCEDURE — 999N000179 XR SURGERY CARM FLUORO LESS THAN 5 MIN W STILLS

## 2025-05-14 PROCEDURE — 360N000084 HC SURGERY LEVEL 4 W/ FLUORO, PER MIN: Performed by: ORTHOPAEDIC SURGERY

## 2025-05-14 PROCEDURE — C1713 ANCHOR/SCREW BN/BN,TIS/BN: HCPCS | Performed by: ORTHOPAEDIC SURGERY

## 2025-05-14 DEVICE — TFNA FENESTRATED SCREW 110MM - STERILE
Type: IMPLANTABLE DEVICE | Site: HIP | Status: FUNCTIONAL
Brand: TFN-ADVANCE

## 2025-05-14 DEVICE — 12MM/130 DEG TI CANN TFNA 380MM/LEFT-STERILE
Type: IMPLANTABLE DEVICE | Site: FEMUR | Status: FUNCTIONAL
Brand: TFN-ADVANCE

## 2025-05-14 DEVICE — LOW PROF LCKNG SCREW F/IM NAIL Ø 5.0MM / L 54MM / XL25/ STER: Type: IMPLANTABLE DEVICE | Site: FEMUR | Status: FUNCTIONAL

## 2025-05-14 DEVICE — TRAUMACEM(TM) V+ INJECTABLE BONE CEMENT - STERILE
Type: IMPLANTABLE DEVICE | Site: HIP | Status: FUNCTIONAL
Brand: TRAUMACEM

## 2025-05-14 RX ORDER — SODIUM CHLORIDE, SODIUM LACTATE, POTASSIUM CHLORIDE, CALCIUM CHLORIDE 600; 310; 30; 20 MG/100ML; MG/100ML; MG/100ML; MG/100ML
INJECTION, SOLUTION INTRAVENOUS CONTINUOUS
Status: DISCONTINUED | OUTPATIENT
Start: 2025-05-14 | End: 2025-05-14 | Stop reason: HOSPADM

## 2025-05-14 RX ORDER — NALOXONE HYDROCHLORIDE 0.4 MG/ML
0.1 INJECTION, SOLUTION INTRAMUSCULAR; INTRAVENOUS; SUBCUTANEOUS
Status: DISCONTINUED | OUTPATIENT
Start: 2025-05-14 | End: 2025-05-14 | Stop reason: HOSPADM

## 2025-05-14 RX ORDER — AMOXICILLIN 250 MG
1 CAPSULE ORAL 2 TIMES DAILY
Status: DISCONTINUED | OUTPATIENT
Start: 2025-05-14 | End: 2025-05-19 | Stop reason: HOSPADM

## 2025-05-14 RX ORDER — HYDROMORPHONE HCL IN WATER/PF 6 MG/30 ML
0.4 PATIENT CONTROLLED ANALGESIA SYRINGE INTRAVENOUS EVERY 5 MIN PRN
Status: DISCONTINUED | OUTPATIENT
Start: 2025-05-14 | End: 2025-05-14 | Stop reason: HOSPADM

## 2025-05-14 RX ORDER — OXYCODONE HYDROCHLORIDE 5 MG/1
5 TABLET ORAL EVERY 4 HOURS PRN
Status: DISCONTINUED | OUTPATIENT
Start: 2025-05-14 | End: 2025-05-19 | Stop reason: HOSPADM

## 2025-05-14 RX ORDER — TRANEXAMIC ACID 650 MG/1
1950 TABLET ORAL ONCE
Status: DISCONTINUED | OUTPATIENT
Start: 2025-05-14 | End: 2025-05-14 | Stop reason: HOSPADM

## 2025-05-14 RX ORDER — DEXAMETHASONE SODIUM PHOSPHATE 4 MG/ML
4 INJECTION, SOLUTION INTRA-ARTICULAR; INTRALESIONAL; INTRAMUSCULAR; INTRAVENOUS; SOFT TISSUE
Status: DISCONTINUED | OUTPATIENT
Start: 2025-05-14 | End: 2025-05-14 | Stop reason: HOSPADM

## 2025-05-14 RX ORDER — BUPIVACAINE HYDROCHLORIDE AND EPINEPHRINE 5; 5 MG/ML; UG/ML
INJECTION, SOLUTION PERINEURAL PRN
Status: DISCONTINUED | OUTPATIENT
Start: 2025-05-14 | End: 2025-05-14 | Stop reason: HOSPADM

## 2025-05-14 RX ORDER — HYDROMORPHONE HCL IN WATER/PF 6 MG/30 ML
0.2 PATIENT CONTROLLED ANALGESIA SYRINGE INTRAVENOUS EVERY 5 MIN PRN
Status: DISCONTINUED | OUTPATIENT
Start: 2025-05-14 | End: 2025-05-14 | Stop reason: HOSPADM

## 2025-05-14 RX ORDER — ONDANSETRON 2 MG/ML
4 INJECTION INTRAMUSCULAR; INTRAVENOUS EVERY 30 MIN PRN
Status: DISCONTINUED | OUTPATIENT
Start: 2025-05-14 | End: 2025-05-14 | Stop reason: HOSPADM

## 2025-05-14 RX ORDER — LIDOCAINE HYDROCHLORIDE 20 MG/ML
INJECTION, SOLUTION INFILTRATION; PERINEURAL PRN
Status: DISCONTINUED | OUTPATIENT
Start: 2025-05-14 | End: 2025-05-14

## 2025-05-14 RX ORDER — CEFAZOLIN SODIUM/WATER 2 G/20 ML
2 SYRINGE (ML) INTRAVENOUS SEE ADMIN INSTRUCTIONS
Status: DISCONTINUED | OUTPATIENT
Start: 2025-05-14 | End: 2025-05-14 | Stop reason: HOSPADM

## 2025-05-14 RX ORDER — SODIUM CHLORIDE, SODIUM LACTATE, POTASSIUM CHLORIDE, CALCIUM CHLORIDE 600; 310; 30; 20 MG/100ML; MG/100ML; MG/100ML; MG/100ML
INJECTION, SOLUTION INTRAVENOUS CONTINUOUS PRN
Status: DISCONTINUED | OUTPATIENT
Start: 2025-05-14 | End: 2025-05-14

## 2025-05-14 RX ORDER — OXYCODONE HYDROCHLORIDE 5 MG/1
10 TABLET ORAL EVERY 4 HOURS PRN
Status: DISCONTINUED | OUTPATIENT
Start: 2025-05-14 | End: 2025-05-19 | Stop reason: HOSPADM

## 2025-05-14 RX ORDER — MAGNESIUM HYDROXIDE 1200 MG/15ML
LIQUID ORAL PRN
Status: DISCONTINUED | OUTPATIENT
Start: 2025-05-14 | End: 2025-05-14 | Stop reason: HOSPADM

## 2025-05-14 RX ORDER — DEXAMETHASONE SODIUM PHOSPHATE 4 MG/ML
INJECTION, SOLUTION INTRA-ARTICULAR; INTRALESIONAL; INTRAMUSCULAR; INTRAVENOUS; SOFT TISSUE PRN
Status: DISCONTINUED | OUTPATIENT
Start: 2025-05-14 | End: 2025-05-14

## 2025-05-14 RX ORDER — ONDANSETRON 4 MG/1
4 TABLET, ORALLY DISINTEGRATING ORAL EVERY 6 HOURS PRN
Status: DISCONTINUED | OUTPATIENT
Start: 2025-05-14 | End: 2025-05-19 | Stop reason: HOSPADM

## 2025-05-14 RX ORDER — PROCHLORPERAZINE MALEATE 10 MG
10 TABLET ORAL EVERY 6 HOURS PRN
Status: DISCONTINUED | OUTPATIENT
Start: 2025-05-14 | End: 2025-05-19 | Stop reason: HOSPADM

## 2025-05-14 RX ORDER — FENTANYL CITRATE 50 UG/ML
INJECTION, SOLUTION INTRAMUSCULAR; INTRAVENOUS PRN
Status: DISCONTINUED | OUTPATIENT
Start: 2025-05-14 | End: 2025-05-14

## 2025-05-14 RX ORDER — ONDANSETRON 2 MG/ML
4 INJECTION INTRAMUSCULAR; INTRAVENOUS EVERY 6 HOURS PRN
Status: DISCONTINUED | OUTPATIENT
Start: 2025-05-14 | End: 2025-05-19 | Stop reason: HOSPADM

## 2025-05-14 RX ORDER — BISACODYL 10 MG
10 SUPPOSITORY, RECTAL RECTAL DAILY PRN
Status: DISCONTINUED | OUTPATIENT
Start: 2025-05-17 | End: 2025-05-14

## 2025-05-14 RX ORDER — HYDROMORPHONE HCL IN WATER/PF 6 MG/30 ML
0.2 PATIENT CONTROLLED ANALGESIA SYRINGE INTRAVENOUS
Status: DISCONTINUED | OUTPATIENT
Start: 2025-05-14 | End: 2025-05-19 | Stop reason: HOSPADM

## 2025-05-14 RX ORDER — VASOPRESSIN IN 0.9 % NACL 2 UNIT/2ML
SYRINGE (ML) INTRAVENOUS PRN
Status: DISCONTINUED | OUTPATIENT
Start: 2025-05-14 | End: 2025-05-14

## 2025-05-14 RX ORDER — ACETAMINOPHEN 325 MG/1
650 TABLET ORAL EVERY 8 HOURS
Status: DISCONTINUED | OUTPATIENT
Start: 2025-05-14 | End: 2025-05-19 | Stop reason: HOSPADM

## 2025-05-14 RX ORDER — PROPOFOL 10 MG/ML
INJECTION, EMULSION INTRAVENOUS CONTINUOUS PRN
Status: DISCONTINUED | OUTPATIENT
Start: 2025-05-14 | End: 2025-05-14

## 2025-05-14 RX ORDER — HYDROMORPHONE HCL IN WATER/PF 6 MG/30 ML
0.4 PATIENT CONTROLLED ANALGESIA SYRINGE INTRAVENOUS
Status: DISCONTINUED | OUTPATIENT
Start: 2025-05-14 | End: 2025-05-19 | Stop reason: HOSPADM

## 2025-05-14 RX ORDER — LIDOCAINE 40 MG/G
CREAM TOPICAL
Status: DISCONTINUED | OUTPATIENT
Start: 2025-05-14 | End: 2025-05-19 | Stop reason: HOSPADM

## 2025-05-14 RX ORDER — FENTANYL CITRATE 0.05 MG/ML
25 INJECTION, SOLUTION INTRAMUSCULAR; INTRAVENOUS EVERY 5 MIN PRN
Status: DISCONTINUED | OUTPATIENT
Start: 2025-05-14 | End: 2025-05-14 | Stop reason: HOSPADM

## 2025-05-14 RX ORDER — POLYETHYLENE GLYCOL 3350 17 G/17G
17 POWDER, FOR SOLUTION ORAL DAILY
Status: DISCONTINUED | OUTPATIENT
Start: 2025-05-15 | End: 2025-05-19 | Stop reason: HOSPADM

## 2025-05-14 RX ORDER — CEFAZOLIN SODIUM 2 G/50ML
2 SOLUTION INTRAVENOUS EVERY 8 HOURS
Status: COMPLETED | OUTPATIENT
Start: 2025-05-14 | End: 2025-05-15

## 2025-05-14 RX ORDER — PROPOFOL 10 MG/ML
INJECTION, EMULSION INTRAVENOUS PRN
Status: DISCONTINUED | OUTPATIENT
Start: 2025-05-14 | End: 2025-05-14

## 2025-05-14 RX ORDER — ONDANSETRON 2 MG/ML
INJECTION INTRAMUSCULAR; INTRAVENOUS PRN
Status: DISCONTINUED | OUTPATIENT
Start: 2025-05-14 | End: 2025-05-14

## 2025-05-14 RX ORDER — ONDANSETRON 4 MG/1
4 TABLET, ORALLY DISINTEGRATING ORAL EVERY 30 MIN PRN
Status: DISCONTINUED | OUTPATIENT
Start: 2025-05-14 | End: 2025-05-14 | Stop reason: HOSPADM

## 2025-05-14 RX ORDER — FENTANYL CITRATE 0.05 MG/ML
50 INJECTION, SOLUTION INTRAMUSCULAR; INTRAVENOUS EVERY 5 MIN PRN
Status: DISCONTINUED | OUTPATIENT
Start: 2025-05-14 | End: 2025-05-14 | Stop reason: HOSPADM

## 2025-05-14 RX ORDER — CEFAZOLIN SODIUM/WATER 2 G/20 ML
2 SYRINGE (ML) INTRAVENOUS
Status: DISCONTINUED | OUTPATIENT
Start: 2025-05-14 | End: 2025-05-14 | Stop reason: HOSPADM

## 2025-05-14 RX ORDER — VECURONIUM BROMIDE 1 MG/ML
INJECTION, POWDER, LYOPHILIZED, FOR SOLUTION INTRAVENOUS PRN
Status: DISCONTINUED | OUTPATIENT
Start: 2025-05-14 | End: 2025-05-14

## 2025-05-14 RX ADMIN — VECURONIUM BROMIDE 3 MG: 1 INJECTION, POWDER, LYOPHILIZED, FOR SOLUTION INTRAVENOUS at 14:54

## 2025-05-14 RX ADMIN — Medication 20 ML: at 13:40

## 2025-05-14 RX ADMIN — ACETAMINOPHEN 650 MG: 325 TABLET, FILM COATED ORAL at 20:53

## 2025-05-14 RX ADMIN — Medication 200 MG: at 16:31

## 2025-05-14 RX ADMIN — VECURONIUM BROMIDE 2 MG: 1 INJECTION, POWDER, LYOPHILIZED, FOR SOLUTION INTRAVENOUS at 15:54

## 2025-05-14 RX ADMIN — TRANEXAMIC ACID 1 G: 1 INJECTION, SOLUTION INTRAVENOUS at 16:20

## 2025-05-14 RX ADMIN — FENTANYL CITRATE 50 MCG: 50 INJECTION INTRAMUSCULAR; INTRAVENOUS at 14:10

## 2025-05-14 RX ADMIN — DEXAMETHASONE SODIUM PHOSPHATE 4 MG: 4 INJECTION, SOLUTION INTRA-ARTICULAR; INTRALESIONAL; INTRAMUSCULAR; INTRAVENOUS; SOFT TISSUE at 14:16

## 2025-05-14 RX ADMIN — ACETAMINOPHEN 975 MG: 325 TABLET, FILM COATED ORAL at 09:03

## 2025-05-14 RX ADMIN — Medication 0.5 UNITS: at 15:00

## 2025-05-14 RX ADMIN — PROPOFOL 30 MCG/KG/MIN: 10 INJECTION, EMULSION INTRAVENOUS at 14:32

## 2025-05-14 RX ADMIN — PROPOFOL 30 MG: 10 INJECTION, EMULSION INTRAVENOUS at 14:16

## 2025-05-14 RX ADMIN — TRANEXAMIC ACID 1 G: 1 INJECTION, SOLUTION INTRAVENOUS at 14:14

## 2025-05-14 RX ADMIN — PANTOPRAZOLE SODIUM 40 MG: 40 TABLET, DELAYED RELEASE ORAL at 20:53

## 2025-05-14 RX ADMIN — SUCRALFATE 1 G: 1 TABLET ORAL at 18:18

## 2025-05-14 RX ADMIN — HYDROMORPHONE HYDROCHLORIDE 0.5 MG: 1 INJECTION, SOLUTION INTRAMUSCULAR; INTRAVENOUS; SUBCUTANEOUS at 16:27

## 2025-05-14 RX ADMIN — FENTANYL CITRATE 50 MCG: 50 INJECTION INTRAMUSCULAR; INTRAVENOUS at 14:06

## 2025-05-14 RX ADMIN — PROPOFOL 170 MG: 10 INJECTION, EMULSION INTRAVENOUS at 14:10

## 2025-05-14 RX ADMIN — LIDOCAINE HYDROCHLORIDE 100 MG: 20 INJECTION, SOLUTION INFILTRATION; PERINEURAL at 14:10

## 2025-05-14 RX ADMIN — SUCRALFATE 1 G: 1 TABLET ORAL at 22:25

## 2025-05-14 RX ADMIN — OXYCODONE HYDROCHLORIDE 10 MG: 5 TABLET ORAL at 22:24

## 2025-05-14 RX ADMIN — TAMOXIFEN CITRATE 20 MG: 10 TABLET, FILM COATED ORAL at 02:39

## 2025-05-14 RX ADMIN — ONDANSETRON 4 MG: 2 INJECTION INTRAMUSCULAR; INTRAVENOUS at 16:10

## 2025-05-14 RX ADMIN — VECURONIUM BROMIDE 2 MG: 1 INJECTION, POWDER, LYOPHILIZED, FOR SOLUTION INTRAVENOUS at 15:36

## 2025-05-14 RX ADMIN — ROCURONIUM BROMIDE 50 MG: 50 INJECTION, SOLUTION INTRAVENOUS at 14:38

## 2025-05-14 RX ADMIN — PHENYLEPHRINE HYDROCHLORIDE 0.5 MCG/KG/MIN: 10 INJECTION INTRAVENOUS at 14:29

## 2025-05-14 RX ADMIN — OXYCODONE HYDROCHLORIDE 5 MG: 5 TABLET ORAL at 09:03

## 2025-05-14 RX ADMIN — CEFAZOLIN SODIUM 2 G: 2 SOLUTION INTRAVENOUS at 22:25

## 2025-05-14 RX ADMIN — SODIUM CHLORIDE, SODIUM LACTATE, POTASSIUM CHLORIDE, AND CALCIUM CHLORIDE: .6; .31; .03; .02 INJECTION, SOLUTION INTRAVENOUS at 14:01

## 2025-05-14 RX ADMIN — PHENYLEPHRINE HYDROCHLORIDE 100 MCG: 10 INJECTION INTRAVENOUS at 14:29

## 2025-05-14 RX ADMIN — SENNOSIDES AND DOCUSATE SODIUM 1 TABLET: 50; 8.6 TABLET ORAL at 20:53

## 2025-05-14 RX ADMIN — Medication 0.5 UNITS: at 15:27

## 2025-05-14 RX ADMIN — HYDROMORPHONE HYDROCHLORIDE 0.4 MG: 0.2 INJECTION, SOLUTION INTRAMUSCULAR; INTRAVENOUS; SUBCUTANEOUS at 11:19

## 2025-05-14 RX ADMIN — OXYCODONE HYDROCHLORIDE 10 MG: 5 TABLET ORAL at 18:18

## 2025-05-14 RX ADMIN — ALBUMIN (HUMAN): 12.5 SOLUTION INTRAVENOUS at 14:48

## 2025-05-14 RX ADMIN — Medication 2 G: at 14:01

## 2025-05-14 RX ADMIN — OXYCODONE HYDROCHLORIDE 5 MG: 5 TABLET ORAL at 04:12

## 2025-05-14 RX ADMIN — PHENYLEPHRINE HYDROCHLORIDE 100 MCG: 10 INJECTION INTRAVENOUS at 14:58

## 2025-05-14 RX ADMIN — ROCURONIUM BROMIDE 50 MG: 50 INJECTION, SOLUTION INTRAVENOUS at 14:11

## 2025-05-14 RX ADMIN — PHENYLEPHRINE HYDROCHLORIDE 100 MCG: 10 INJECTION INTRAVENOUS at 15:18

## 2025-05-14 ASSESSMENT — ACTIVITIES OF DAILY LIVING (ADL)
ADLS_ACUITY_SCORE: 51

## 2025-05-14 ASSESSMENT — LIFESTYLE VARIABLES: TOBACCO_USE: 1

## 2025-05-14 NOTE — ANESTHESIA PREPROCEDURE EVALUATION
Anesthesia Pre-Procedure Evaluation    Patient: Vandana Gonzalez   MRN: [id number] : 1960          Procedure : Procedure(s):  OPEN REDUCTION INTERNAL FIXATION, FRACTURE, FEMUR, USING INTRAMEDULLARY SAMI AND FRACTURE TABLE         Past Medical History:   Diagnosis Date    Closed fracture of unspecified part of neck of femur 2002    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, unspecified site     large angioma adomen    Hypertensive disorder     Klippel Trenaunay disease     AVM left leg    Phlebitis and thrombophlebitis of other deep vessels of lower extremities 2002    AVM left leg    Stasis ulcer of lower extremity (H) 2012    Left-recurrent      Past Surgical History:   Procedure Laterality Date    ARTHROPLASTY HIP ANTERIOR Right 10/28/2021    Procedure: ARTHROPLASTY, RIGHT TOTAL HIP, DIRECT ANTERIOR APPROACH;  Surgeon: Jimmy Pena MD;  Location:  OR    BIOPSY OF BREAST, INCISIONAL  2009    phyllodes tumor left    BREAST SURGERY  2009    COLONOSCOPY  3-2022    ESOPHAGOSCOPY, GASTROSCOPY, DUODENOSCOPY (EGD), COMBINED N/A 10/07/2021    Procedure: ESOPHAGOGASTRODUODENOSCOPY (EGD);  Surgeon: Romeo Chavez MD;  Location:  GI    FEMUR SURGERY      2002 left side     ORTHOPEDIC SURGERY  2002    repair broken femur    ZZC LIGATN FEMORAL VEIN      multiple vein strippings left    ZZHC UGI ENDOSCOPY, SIMPLE EXAM  2002    esophageal stricture      Allergies   Allergen Reactions    Contrast Dye Hives    No Known Allergies       Social History     Tobacco Use    Smoking status: Former     Current packs/day: 0.00     Average packs/day: 0.3 packs/day for 2.0 years (0.5 ttl pk-yrs)     Types: Cigarettes     Start date: 10/1/1990     Quit date: 10/1/1992     Years since quittin.6    Smokeless tobacco: Never    Tobacco comments:     Quit    Substance Use Topics    Alcohol use: Yes     Comment: 2-3 beers daily      Wt Readings from Last 1 Encounters:   25  "93 kg (205 lb)        Anesthesia Evaluation            ROS/MED HX  ENT/Pulmonary:     (+)                tobacco use, Past use,                       Neurologic:       Cardiovascular: Comment: Klippel Trenaunay disease (AVM of left leg) with large port wine stain over left leg    (+)  hypertension- -   -  - -                                      METS/Exercise Tolerance: >4 METS    Hematologic:     (+)      anemia,          Musculoskeletal:   (+)     fracture, Fracture location: LLE,         GI/Hepatic:     (+) GERD,                   Renal/Genitourinary:       Endo:       Psychiatric/Substance Use:     (+)   alcohol abuse      Infectious Disease:       Malignancy:   (+) Malignancy, History of Breast.    Other:              Physical Exam  Airway  Mallampati: II  TM distance: >3 FB  Neck ROM: full  Mouth opening: >= 4 cm    Cardiovascular   Rhythm: regular  Rate: normal rate   Comments: Klippel Trenaunay disease (AVM of left leg) with large port wine stain over left leg  Dental   (+) Completely normal teeth      Pulmonary - normal exam      Neurological - normal exam  She appears awake, alert and oriented x3.    Other Findings       OUTSIDE LABS:  CBC:   Lab Results   Component Value Date    WBC 5.4 05/14/2025    WBC 8.5 05/13/2025    HGB 9.3 (L) 05/14/2025    HGB 9.3 (L) 05/13/2025    HCT 30.1 (L) 05/14/2025    HCT 29.2 (L) 05/13/2025     05/14/2025     05/13/2025     BMP:   Lab Results   Component Value Date     05/14/2025     (L) 05/13/2025    POTASSIUM 3.5 05/14/2025    POTASSIUM 3.5 05/13/2025    CHLORIDE 103 05/14/2025    CHLORIDE 98 05/13/2025    CO2 21 (L) 05/14/2025    CO2 19 (L) 05/13/2025    BUN 5.3 (L) 05/14/2025    BUN 6.7 (L) 05/13/2025    CR 0.44 (L) 05/14/2025    CR 0.45 (L) 05/13/2025     (H) 05/14/2025     (H) 05/13/2025     COAGS:   Lab Results   Component Value Date    PTT 26 05/13/2025    INR 1.08 05/13/2025     POC: No results found for: \"BGM\", \"HCG\", " "\"HCGS\"  HEPATIC:   Lab Results   Component Value Date    ALBUMIN 4.4 10/07/2024    PROTTOTAL 7.6 10/07/2024    ALT 13 10/07/2024    AST 19 10/07/2024    ALKPHOS 70 10/07/2024    BILITOTAL 0.4 10/07/2024     OTHER:   Lab Results   Component Value Date    MATTEO 8.6 (L) 05/14/2025    PHOS 3.9 02/23/2022    TSH 4.13 10/07/2024    T4 0.99 02/23/2022    CRP <2.9 01/13/2022    SED 26 01/13/2022       Anesthesia Plan    ASA Status:  3      NPO Status: NPO Appropriate   Anesthesia Type: General.  Airway: oral.  Induction: intravenous.  Maintenance: Balanced.   Techniques and Equipment:       - Monitoring Plan: standard ASA monitoring     Consents    Anesthesia Plan(s) and associated risks, benefits, and realistic alternatives discussed. Questions answered and patient/representative(s) expressed understanding.     - Discussed: anesthesiologist, CRNA     - Discussed with:  Patient        - Pt is DNR/DNI Status: no DNR     Blood Consent:      - Discussed with: not discussed.     Postoperative Care    Pain management: plan for postoperative opioid use, peripheral nerve block.     Comments:    Other Comments: Fascia iliaca block performed in preop               Anish Santoro MD    I have reviewed the pertinent notes and labs in the chart from the past 30 days and (re)examined the patient.  Any updates or changes from those notes are reflected in this note.    Clinically Significant Risk Factors         # Hyponatremia: Lowest Na = 132 mmol/L in last 2 days, will monitor as appropriate           # Hypertension: Noted on problem list            # Obesity: Estimated body mass index is 30.27 kg/m  as calculated from the following:    Height as of this encounter: 1.753 m (5' 9\").    Weight as of this encounter: 93 kg (205 lb)., PRESENT ON ADMISSION                  "

## 2025-05-14 NOTE — PLAN OF CARE
Goal Outcome Evaluation:       Shift Summary [id number]    Admitting Diagnosis: Closed displaced intertrochanteric fracture of left femur, initial encounter (H) [B64.697R]   Vitals VSS on RA  Pain managed by scheduled tylenol and PRN Oxy  A&Ox4  Voiding purewick in place  Mobility bedrest  CMS Intact  GI No BM @ this shift  Dressing N/A   IV SL  Orders Placed This Encounter      NPO for Procedure/Surgery per Anesthesia Guidelines Except for: Meds; Clear liquids before procedure/surgery: ADULT (Age GREATER than or Equal to 18 years) - Clear liquids 2 hours before procedure/surgery       Plan: OPEN REDUCTION INTERNAL FIXATION, FRACTURE, FEMUR, USING INTRAMEDULLARY SAMI AND FRACTURE TABLE today.

## 2025-05-14 NOTE — DISCHARGE INSTRUCTIONS
Wound Care Instructions:  Left leg wound(s): Every other day and as needed for drainage strike-through   Cleanse with MicroKlenz wound cleanser. Pat dry.    Cut a piece of Polymem Roll to fit the size of the wound. Apply to the wound bed.   Cover with a sacral mepilex dressing.

## 2025-05-14 NOTE — PROGRESS NOTES
[facility]    Medicine Progress Note - Hospitalist Service    Date of Admission:  5/13/2025    Assessment & Plan   Vandana Gonzalez is a 64 year old female with past medical history significant for osteoporosis, breast cancer who presents with fall and left hip pain. Admitted on 5/13/2025.     Acute left intertrochanteric femur fracture, mildly displaced  S/p  Left intramedullary nail fixation on 5/14/25  Mechanical fall  Osteoporosis      *L hip XRay 5/12/25 =    mildly displaced left intertrochanteric hip fracture  *RCRI 0. Denies any chest pain or shortness of breath.   Can do work of 4+ mets at baseline.   EKG 5/12/25 is NSR  - Orthopedic surgery consulted, IM nail fixation 5/14.       -  post op orders  (pain, activity, wound care, VTE prophyllaxis.) per ortho surgeon     - Scheduled acetaminophen   - Oxycodone PO PRN, hydromorphone IV PRN      Hyponatremia  resolved  Sodium 132 --> 136.      Acute on chronic anemia  H/o esophageal ulcers  H/o Low B12  Hgb 9.3 g/dl, last hgb per CareEverywhere 10.7 g/dl 11/7/24.   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   Chronic lymphedema, L>R  Chronic left lower extremity wound, POA  Chronic port wine discoloration left leg with lymphedema and chronic ulceration   - Wound RN consulted     Breast cancer  -Right breast Invasive Ductal Carcinoma ER positive, [place] negative, HER2 negative   Stage IA (pT1b, pN0(sn), cM0, G2, Oncotype DX score: 26)   - R lumpectomy 11/2024,  status post radiation 1/2025  -Follows with  oncology. Treated with lumpectomy, radiation, tamoxifen.   - Continue prior to admission tamoxifen      Hypertension  -  "Hold prior to admission telmisartan pending surgery    GERD  - Continue prior to admission PPI      Alcohol overuse  Reports drinking 2-3 beers daily. Denies any history of withdrawal.  - Discussed recommended alcohol limits  - Monitor clinically for any signs of withdrawal           Diet: NPO for Procedure/Surgery per Anesthesia Guidelines Except for: Meds; Clear liquids before procedure/surgery: ADULT (Age GREATER than or Equal to 18 years) - Clear liquids 2 hours before procedure/surgery    DVT Prophylaxis: Pneumatic Compression Devices, per ortho after surgery  Lemons Catheter: Not present  Lines: None     Cardiac Monitoring: None  Code Status: Full Code      Clinically Significant Risk Factors         # Hyponatremia: Lowest Na = 132 mmol/L in last 2 days, will monitor as appropriate           # Hypertension: Noted on problem list            # Obesity: Estimated body mass index is 30.27 kg/m  as calculated from the following:    Height as of this encounter: 1.753 m (5' 9\").    Weight as of this encounter: 93 kg (205 lb)., PRESENT ON ADMISSION            Social Drivers of Health    Physical Activity: Patient Declined (10/4/2024)    Exercise Vital Sign     Days of Exercise per Week: Patient declined     Minutes of Exercise per Session: Patient declined   Stress: Stress Concern Present (10/4/2024)    [employer] [employer] - Occupational Stress Questionnaire     Feeling of Stress : To some extent   Social Connections: Unknown (10/4/2024)    Social Connection and Isolation Panel [NHANES]     Frequency of Social Gatherings with Friends and Family: Patient declined          Disposition Plan     Medically Ready for Discharge: Anticipated in 2-4 Days             Maddi Gomez MD  Hospitalist Service  [facility]  Securely message with [employer] (more info)  Text page via [employer] Paging/Directory "   ______________________________________________________________________    Interval History   Pain worse, looking forward to surgery    Physical Exam   Vital Signs: Temp: 99  F (37.2  C) Temp src: Oral BP: 122/68 Pulse: 87   Resp: 16 SpO2: 99 % O2 Device: None (Room air)    Weight: 205 lbs 0 oz   Constitutional: Well-appearing, NAD   mild-mod pain at rest  Respiratory: Clear to auscultation bilaterally, good air movement, normal effort   Cardiovascular: RRR, no m/r/g. Bilateral lower extremity edema L >> R  GI: Soft, non-tender, non-distended. No rebound tenderness or guarding.    Skin: Warm, dry. Port wine discoloration throughout left lower extremity. Wound left lateral shin.   Neurologic: Alert. Responding to questions appropriately. Following commands.  MS:   DC has chronic edema and multiple surgical wounds from previous vein surgeries and a major surgery for a L femur fracture in past.    Psychiatric: Normal affect, appropriate          Medical Decision Making       45 MINUTES SPENT BY ME on the date of service doing chart review, history, exam, documentation & further activities per the note.      Data     I have personally reviewed the following data over the past 24 hrs:    5.4  \   9.3 (L)   / 202     137 103 5.3 (L) /  109 (H)   3.5 21 (L) 0.44 (L) \       Imaging results reviewed over the past 24 hrs:   Recent Results (from the past 24 hours)   XR Femur Left 2 Views    Narrative    EXAM: XR FEMUR LEFT 2 VIEWS  LOCATION: [facility]  DATE: 5/13/2025    INDICATION: prior femur fracture with hardware  COMPARISON: 05/13/2025 0316 hours      Impression    IMPRESSION: Acute comminuted mildly displaced intertrochanteric fracture of the left proximal femur. Normal alignment of the left hip with maintained joint space. Healed fracture of the distal femur with indwelling plate and screw fixation. Normal left   knee alignment with mild degenerative change. Diffuse osseous demineralization  which limits evaluation for nondisplaced fractures and subtle osseous lesions.

## 2025-05-14 NOTE — OP NOTE
ORTHOPEDIC OPERATIVE REPORT     Patient Name:  Vandana Gonzalez 64 year old female  :  1960  MR Number:   [id number]  [facility] Number:   [id number]    Date of Service: 2025  1:30 PM    Surgeon:  Nik Nolasco DO    Assistant(s):  Miley Kearney PA-C    OR Staff:  Circulator: Lucas Ahumada RN  Relief Circulator: Richi Dasilva RN  Relief Scrub: Caitlin Alvarez  Scrub Person: StuPortia    Preoperative Diagnosis:  Left displaced intertrochanteric femur fracture  Prior left distal femur fixation with retained hardware    Postoperative Diagnosis:  Same as above    Procedure(s):  Removal of deep hardware left distal femur fracture, screws x4, requiring separate incision (32059)  Open reduction, intramedullary nail fixation left intertrochanteric femur fracture (25937)    MODIFIER 22 -   Modifier 22 is appropriate in this situation given the patient's body habitus, chronic genetic condition involving lymphedema, AVM formations in skin and subcutaneous layers necessitating additional cautery and suture ligation of rather extensive venous branches that yielded further bleeding.  Additional instrumentation was also needed and called for separately during the case to enable deeper dissection, a longer bone hook for adequate fracture reduction.  Additional work was also necessary to remove selective hardware from previous distal femur fixation in order to allow implant placement for fixation of her acute hip fracture.  This did add additional technical expertise and time to the case, approximately 1 hour or 30%.    Anesthesia Type:  General with Block    Estimated Blood Loss:  500cc    Specimens: none    Drain(s), Tube(s), Packing: none    Complications:  none    Implants/Grafts:   Implant Name Type Inv. Item Serial No.  Lot No. LRB No. Used Action   SAMI 12MM 130 DEG TI [employer] TFNA 380MM LT STRL 04.037.259S - [id number] Metallic Hardware/[place] SAMI 12MM 130 DEG TI [employer] TFNA 380MM LT STRL  "04.037.259S  [employer] [id number] Left 1 Implanted   KIT BONE CEMENT SYN TRAUMACEM V+ INJ 07.702.040S - [id number] Cement, Bone KIT BONE CEMENT SYN TRAUMACEM V+ INJ 07.702.040S  [employer] [id number] Left 1 Implanted   IMP SCR SYN TFNA FENESTRATED LAG 110MM 04.038.210S - [id number] Metallic Hardware/[place] IMP SCR SYN TFNA FENESTRATED LAG 110MM 04.038.210S  [employer] [id number] Left 1 Implanted   low prof locking screw f/im nail 5.0 mm x l 50 mm xl25 ster Metallic Hardware/[place]   [employer]uy [id number] Left 1 Implanted   low prof locking screw f/im nail 5.0 mm x l 54 mm xl 25 ster Metallic Hardware/[place]   Depuy [id number] Left 1 Implanted              Indications:  The patient is a 64 year old female with a left intertrochanteric fracture resulting from a ground level fall.  The patient denies antecedent pain, but does admit that the left lower extremity is her \"bad leg\" and is chronically weak.  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  She also has a history of stasis ulcers of the left lower extremity which are recurrent.  Chronic lymphedema of the left lower extremity is also limited her mobility and caused some chronic issues with that limb as well.  We discussed treatment options for her left intertrochanteric femur fracture and recommendations were for formal internal fixation with intramedullary device.  She also has a history of distal femur fixation greater than 20 years ago with retained hardware.  I did discuss this with her in relation to likelihood of at least partial hardware removal to allow for adequate stabilization of her new fracture and placement of an intramedullary implant.  We discussed general risks benefits and alternatives, see below, as well as those unique to her.  These may include wound healing complications, skin issues, skin infection or deeper infection, worsening swelling or phlebitis, bleeding tendencies secondary " to AVMs, etc.  I discussed the risks and benefits of the procedure, including the risks of infection, wound healing complication, neurovascular injury, instability, limb length discrepancy, need for revision surgery, and the medical risks of anesthesia including MI, stroke, and death.  Benefits include early mobilization, improved chance of union, and reduction in the medical risks of hip fractures.  Alternatives to surgery were also discussed, including non-operative management, which I did not recommend.  The patient was in agreement with the plan to proceed, and the informed consent was signed and documented.  I met with the patient pre-operatively and marked the operative extremity with their agreement.  We proceeded to the operating room.     Procedure in Detail:  The patient underwent general anesthesia, and was positioned supine on the hana table with all bony prominences were well padded.  The perineal post was placed, and legs secured in holding boots (of note the right foot was placed into a small boot and the left foot into an extra-large boot secondary to lymphedema).  Upper extremities were padded and secured over the chest.  Preoperative imaging was obtained to perform a closed reduction of the fracture adjusting traction and rotation.  The operative extremity was then prepped and draped in usual sterile fashion.  A procedural pause was conducted to verify correct patient, correct extremity, presence of the surgeons initials on the operative extremity, administration of IV antibiotics, in this case 2 g Ancef.  The starting guide pin was then introduced percutaneously to the proper starting point at the tip of the greater trochanter, verified under biplanar fluoroscopy. This was driven into the proximal femur, and an incision was made around the wire, sharply down through fascia.  At this point there were noted to be 3 large venous bleeders within the subcutaneous tissues which was quite abundant.   Tissues were extremely friable as well.  This made control of bleeding a bit more challenging.  These vessels were identified, smaller ones radially coagulated, slightly larger ones were tied off and ligated.  Once bleeding had been controlled deeper dissection was carried down bluntly, very cautiously and the opening reamer and soft tissue protector were introduced to open the proximal femur, instruments and pin were then withdrawn.  A long ball-tip guidewire was then passed into the distal femur, attempting to gauge a size for a nail length given her prior hardware and potential decision to proceed with selective hardware removal.  It was evident at that time that hardware would need to be addressed.  Attention was then turned to the distal femur, the proximal wound was packed with a sponge.  Fluoroscopy was brought in and the hardware was identified.  Positioning of the plate did yield an alternative incision to be made for hardware removal.  An approximately 3 cm incision was made to access the distal hardware.  Unfortunately further large tortuous venous traversing vessels were cut and again required ligation and cautery as needed.  Caution was exercised throughout the duration of the case for any soft tissue retraction, soft tissue handling etc.  I bluntly dissected down to the screws within the condylar plate under fluoroscopy.  A small fragment hex screwdriver was used to remove the proximalmost 4 screws from the plate, this allowed for adequate room for nail passage.  This did require a separate incision from that used for the instrumentation and fixation of her proximal femur fracture.  At this point the removal of hardware was complete, I did not feel as though further hardware removal was warranted or beneficial, and I did not want to remove the plate in its entirety to destabilize the distal femur as this would have left a fair bony void within the distal femur.  A ball-tipped guidewire was then  readvanced to an appropriate length and the measuring guide used to plan for proper nail length.  The canal was then sequentially reamed up to size 14 through a very patulous canal.  The appropriate size nail was then chosen, 12 x 380, assembled onto the jig, and passed over the guidewire to the appropriate depth which did overlap the hardware distally.  The cannula was introduced through the jig, a skin incision was made again encountering venous bleeders which required cautery and ligation.  I advanced the cannula, however prior to placing a guidewire for the lag screw I did notice interval displacement of the fracture itself.  The cannula was withdrawn.  I did extend the incision a couple of centimeters proximally at this point to allow for passage of a Martinez elevator as well as a bone hook.  The initial bone hook that was utilized was insufficient due to the soft tissue envelope I did not have enough reach in order to gain access to the medial femoral neck, as such additional instrument pans had to be brought up sterilely with larger retractors and bone hooks available.  I was then able to better reduce the fracture and this was held manually, I also placed an additional guidewire flanking the nail to hold with provisional fixation better.  The cannula was then reintroduced and a guidepin was advanced in an approximate center-center position in the femoral neck and head, confirmed by AP and lateral fluoroscopy.  The patient's body habitus did yield rather marked difficulties with passage of the wire.  The jig also did impinge upon the lateral thigh bit, and without the larger jig to accommodate a patient with larger body habitus, this was the only one available.  The measuring guide was then used to select the proper size lag screw.  The cannulated drill was then passed over the guidepin to ream for the lag screw, with notably poor bone quality in the femoral head and neck.  The lag screw was then advanced over  the guidepin under fluoroscopy.  The compression nut was then advanced, ensuring proper compression across the fracture site under fluoroscopy while removing the provisionally placed a guidepin.  The set screw was tightened proximally, and backed off slightly.  At this point secondary to very poor bone quality in the femoral head neck, as well as other difficulties the patient may experience with the chronic swelling/lymphedema of the limb during mobility/ambulation and physical therapy, I did elect to proceed with augmenting fixation.  On the back table cement was mixed and prepared, this was injected through the cannula with the injection device under fluoroscopy with good fill of the femoral head to augment stabilization of the lag screw.  The jig was then disassembled and removed.  Traction was then gently removed carefully under fluoroscopy.  Attention was turned to the distal femur, and single distal interlocking screw was placed in standard perfect [place] technique avoiding the prior condylar plate.  This was a headless type screw in order to gain purchase in the near cortex for additional stability.  The screw was placed in the oblong hole.  I did not feel as though additional fixation was warranted, and slight dynamization of the nail would be okay.  Final fluoroscopic images taken to confirm reduction, hardware position, and screw lengths.  Incisions were copiously irrigated, and closed in layers carefully, #1 vicryl in deep layers, and 2-0 vicryl in subcutaneous tissues, followed by staples for the skin.  Sterile dressings were then applied.  The patient was then turned over to anesthesia for transfer to PACU and extubation.    Miley Kearney PA-C was needed for and participated in all the aspects of the case, including preoperative preparation and positioning of the patient, intraoperative manipulation of the limb, retraction of soft tissues, protection of vital structures, suctioning of bodily fluids  and wound closure as well as dressing application.    Postoperative Plan:  Activity / weight bearing: WBAT, PT/OT, gait aids, fall precautions, will monitor dressings  Anticoagulation VTE ppx: mechanical SCDs, pharmacologic lovenox 40 subq daily 6 weeks  Antibiotics: perioperative 24 hours  Analgesia: multimodal regimen, limit sedating meds  Discharge planning: pending progress with PT, appreciate social work assistance, anticipate needing placement though will see how she does  Follow up: 2-3 weeks in office for incision check, possible staple removal if incisions look good, though may need to wait longer due to skin condition, as well as full length left femur radiographs       Nik Nolasco,  MSc  5/14/2025  4:39 PM

## 2025-05-14 NOTE — PLAN OF CARE
Goal Outcome Evaluation:    Denies CP, SOB. All pt needs met at this time. Wound care done by WOC RN. Pts L toes appeared more purple than this AM after pt got on unit, RN called and verified that they were this color for PACU by Melba Hsieh in PACU. Color improved to some purple on distal part of toes now.

## 2025-05-14 NOTE — CONSULTS
[facility]    ORTHOPEDIC CONSULTATION NOTE     Patient: Vandana Gonzalez 64 year oldfemale   Admit Date: 5/13/2025          Today's Date: 5/14/2025  Attending: Steve Hodges MD     HPI  The patient is a 64 year old female who initially presented to the ER yesterday 5/13/2025 after a fall tripping over a bathroom rug.  She landed on her left side, immediate pain with injury and inability to weight-bear.  Radiographs in the emergency department upon presentation demonstrated a comminuted and displaced intertrochanteric femur fracture.  Of note, the patient does have prior hardware in which appears to be a dynamic condylar screw of the ipsilateral distal femur she endorses was placed greater than 20 years ago.  She also sustained a contralateral right hip fracture in 2021 for which she underwent total hip arthroplasty and he has done well.  Her left side is reportedly her bedside, she does have chronic lymphedema, and her left leg is her weak side.  She does use a cane for ambulation.  She does have a history of Klippel Trenaunay disease with chronic lymphedema, vascular wounds, port wine skin discoloration Sunday to the entirety of the left lower extremity.  No family members currently present.  Her pain is controlled at rest.  We discussed treatment options today.  Her past medical and surgical history were reviewed, below.    Medical History  Past Medical History:   Diagnosis Date    Closed fracture of unspecified part of neck of femur 11/2002    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, unspecified site     large angioma adomen    Hypertensive disorder     Klippel Trenaunay disease 2002    AVM left leg    Phlebitis and thrombophlebitis of other deep vessels of lower extremities 06/2002    AVM left leg    Stasis ulcer of lower extremity (H) 2012    Left-recurrent       Surgical History  Past Surgical History:   Procedure Laterality Date    ARTHROPLASTY HIP ANTERIOR Right  10/28/2021    Procedure: ARTHROPLASTY, RIGHT TOTAL HIP, DIRECT ANTERIOR APPROACH;  Surgeon: Jimmy Pena MD;  Location: [facility] OR    BIOPSY OF BREAST, INCISIONAL  07/2009    phyllodes tumor left    BREAST SURGERY  07/2009    COLONOSCOPY  3-2022    ESOPHAGOSCOPY, GASTROSCOPY, DUODENOSCOPY (EGD), COMBINED N/A 10/07/2021    Procedure: ESOPHAGOGASTRODUODENOSCOPY (EGD);  Surgeon: Romeo Chavez MD;  Location:  GI    FEMUR SURGERY      2002 left side     ORTHOPEDIC SURGERY  11/2002    repair broken femur    ZZC LIGATN FEMORAL VEIN      multiple vein strippings left    ZZHC UGI ENDOSCOPY, SIMPLE EXAM  04/2002    esophageal stricture       Medications  Current Facility-Administered Medications   Medication Dose Route Frequency Provider Last Rate Last Admin    [Auto Hold] acetaminophen (TYLENOL) tablet 975 mg  975 mg Oral TID Steve Hodges MD   975 mg at 05/14/25 0903    [Auto Hold] benzocaine-menthol (CHLORASEPTIC) 6-10 MG lozenge 1 lozenge  1 lozenge Buccal Q1H PRN Steve Hodges MD        [Auto Hold] bisacodyl (DULCOLAX) suppository 10 mg  10 mg Rectal Daily PRN Steve Hodges MD        ceFAZolin Sodium (ANCEF) injection 2 g  2 g Intravenous Pre-Op/Pre-procedure x 1 dose Kelsy Wolff PA-C        ceFAZolin Sodium (ANCEF) injection 2 g  2 g Intravenous See Admin Instructions Kelsy Wolff PA-C        [Auto Hold] hydrALAZINE (APRESOLINE) tablet 10 mg  10 mg Oral Q4H PRN Steve Hodges MD        Or    [Auto Hold] hydrALAZINE (APRESOLINE) injection 10 mg  10 mg Intravenous Q4H PRN Steve Hodges MD        [Auto Hold] HYDROmorphone (DILAUDID) injection 0.2 mg  0.2 mg Intravenous Q2H PRN Steve Hodges MD        [Auto Hold] HYDROmorphone (DILAUDID) injection 0.4 mg  0.4 mg Intravenous Q2H PRN Steve Hodges MD   0.4 mg at 05/14/25 1119    lactated ringers infusion   Intravenous Continuous Omid Lim MD         [Auto Hold] lidocaine (LMX4) cream   Topical Q1H PRN Steve Hodges MD        lidocaine 1 % 0.1-1 mL  0.1-1 mL Other Q1H PRN Omid Lim MD        [Auto Hold] lidocaine 1 % 0.1-1 mL  0.1-1 mL Other Q1H PRN Steve Hodges MD        [Held by provider] losartan (COZAAR) tablet 100 mg  100 mg Oral Daily Maddi Gomez MD        [Auto Hold] melatonin tablet 5 mg  5 mg Oral At Bedtime PRN Steve Hodges MD        [Auto Hold] naloxone (NARCAN) injection 0.2 mg  0.2 mg Intravenous Q2 Min PRN Maddi Gomez MD        Or    [Auto Hold] naloxone (NARCAN) injection 0.4 mg  0.4 mg Intravenous Q2 Min PRN Maddi Gomez MD        Or    [Auto Hold] naloxone (NARCAN) injection 0.2 mg  0.2 mg Intramuscular Q2 Min PRN Maddi Gomez MD        Or    [Auto Hold] naloxone (NARCAN) injection 0.4 mg  0.4 mg Intramuscular Q2 Min PRN Maddi Gomez MD        [Auto Hold] ondansetron (ZOFRAN ODT) ODT tab 4 mg  4 mg Oral Q6H PRN Steve Hodges MD        Or    [Auto Hold] ondansetron (ZOFRAN) injection 4 mg  4 mg Intravenous Q6H PRN Steve Hodges MD        [Auto Hold] oxyCODONE (ROXICODONE) tablet 5 mg  5 mg Oral Q4H PRN Steve Hodges MD   5 mg at 05/14/25 0903    [Auto Hold] oxyCODONE IR (ROXICODONE) half-tab 2.5 mg  2.5 mg Oral Q4H PRSteve Montiel MD        [Auto Hold] pantoprazole (PROTONIX) EC tablet 40 mg  40 mg Oral BID Maddi Gomez MD   40 mg at 05/13/25 2050    [Auto Hold] polyethylene glycol (MIRALAX) Packet 17 g  17 g Oral BID PRN Steve Hodges MD        [Auto Hold] prochlorperazine (COMPAZINE) injection 10 mg  10 mg Intravenous Q6H PRN Steve Hodges MD        Or    [Auto Hold] prochlorperazine (COMPAZINE) tablet 10 mg  10 mg Oral Q6H PRN Steve Hodges MD        ROPivacaine (NAROPIN) 5 MG/ mg, ketorolac (TORADOL) 30 mg, EPINEPHrine (ADRENALIN) 0.6 mg in sodium chloride 0.9 % 100 mL (ORTHO SIMON  STANDARD DOSE)   INTRA-ARTICULAR On Call to OR Mariella, Kelsy Ley PA-C        [Auto Hold] senna-docusate (SENOKOT-S/PERICOLACE) 8.6-50 MG per tablet 1 tablet  1 tablet Oral BID PRN Steve Hodges MD        Or    [Auto Hold] senna-docusate (SENOKOT-S/PERICOLACE) 8.6-50 MG per tablet 2 tablet  2 tablet Oral BID PRN Steve Hodges MD        sodium chloride (PF) 0.9% PF flush 3 mL  3 mL Intracatheter q1 min prn Omid Lim MD        [Auto Hold] sodium chloride (PF) 0.9% PF flush 3 mL  3 mL Intracatheter Q8H Steve Hodges MD   3 mL at 25 0905    [Auto Hold] sodium chloride (PF) 0.9% PF flush 3 mL  3 mL Intracatheter q1 min prn Steve Hodges MD   3 mL at 25 1120    [Auto Hold] sucralfate (CARAFATE) tablet 1 g  1 g Oral 4x Daily Maddi Gomez MD   1 g at 25 2132    [Auto Hold] tamoxifen (NOLVADEX) tablet 20 mg  20 mg Oral Daily Maddi Gomez MD   20 mg at 25 0239    tranexamic acid (LYSTEDA) tablet 1,950 mg  1,950 mg Oral Once Kelsy Wolff PA-C           Allergies  Allergies   Allergen Reactions    Contrast Dye Hives    No Known Allergies         Family History  Family History   Problem Relation Age of Onset    Cancer - colorectal Father     Diabetes Brother        Social History  Social History     Socioeconomic History    Marital status:      Spouse name: None    Number of children: 0    Years of education: None    Highest education level: None   Occupational History    Occupation: book-keeping   Tobacco Use    Smoking status: Former     Current packs/day: 0.00     Average packs/day: 0.3 packs/day for 2.0 years (0.5 ttl pk-yrs)     Types: Cigarettes     Start date: 10/1/1990     Quit date: 10/1/1992     Years since quittin.6    Smokeless tobacco: Never    Tobacco comments:     Quit    Vaping Use    Vaping status: Never Used   Substance and Sexual Activity    Alcohol use: Yes     Comment: 2-3  "beers daily    Drug use: No    Sexual activity: Not Currently     Partners: Male     Birth control/protection: Post-menopausal   Other Topics Concern    Parent/sibling w/ CABG, MI or angioplasty before 65F 55M? No     Social Drivers of Health     Financial Resource Strain: Low Risk  (10/4/2024)    Financial Resource Strain     Within the past 12 months, have you or your family members you live with been unable to get utilities (heat, electricity) when it was really needed?: No   Food Insecurity: Low Risk  (10/4/2024)    Food Insecurity     Within the past 12 months, did you worry that your food would run out before you got money to buy more?: No     Within the past 12 months, did the food you bought just not last and you didn t have money to get more?: No   Transportation Needs: Low Risk  (10/4/2024)    Transportation Needs     Within the past 12 months, has lack of transportation kept you from medical appointments, getting your medicines, non-medical meetings or appointments, work, or from getting things that you need?: No   Physical Activity: Patient Declined (10/4/2024)    Exercise Vital Sign     Days of Exercise per Week: Patient declined     Minutes of Exercise per Session: Patient declined   Stress: Stress Concern Present (10/4/2024)    [employer] - Occupational Stress Questionnaire     Feeling of Stress : To some extent   Social Connections: Unknown (10/4/2024)    Social Connection and Isolation Panel [NHANES]     Frequency of Social Gatherings with Friends and Family: Patient declined   Housing Stability: Low Risk  (10/4/2024)    Housing Stability     Do you have housing? : Yes     Are you worried about losing your housing?: No       ROS  Left lower extremity pain, hip. All other systems were reviewed and found to be negative    Physical Exam  /68 (BP Location: Right arm)   Pulse 87   Temp 99  F (37.2  C) (Oral)   Resp 16   Ht 1.753 m (5' 9\")   Wt 93 kg (205 lb)   " LMP 08/05/2012   SpO2 99%   BMI 30.27 kg/m    General: appears to be in no acute distress  Psychiatric: alert & oriented x3, appropriate responses to questions, pleasant mood and affect  HEENT: normocephalic, atraumatic, EOMI  Neck: supple  Chest: breathing unlabored  Heart: palpable peripheral pulses  Left lower extremity: Diffuse port wine skin discoloration throughout, mottling, chronic lymphedema which she states is relatively unchanged, unable to really discern any degree of swelling about the hip secondary to the above, motion deferred secondary to known fracture, no pain at the ipsilateral knee or ankle, she is able to demonstrate gentle dorsiflexion and plantarflexion of the ankle, skin is warm and well-perfused, secondary to edema pulses are unable to be palpated, sensation is intact per her baseline.      Imaging  Radiographs of the pelvis, left hip, and left femur demonstrate a mildly displaced intertrochanteric femur fracture, diffuse osseous demineralization, prior orthopedic hardware distal femur, appears to be dynamic condylar screw, knee appears atraumatic otherwise.  Total hip arthroplasty contralateral right side stable    Laboratory Values  Lab Results   Component Value Date    WBC 5.4 05/14/2025    HGB 9.3 (L) 05/14/2025    HCT 30.1 (L) 05/14/2025    MCV 87 05/14/2025     05/14/2025    PTT 26 05/13/2025    INR 1.08 05/13/2025     Lab Results   Component Value Date     05/14/2025    CO2 21 (L) 05/14/2025    BUN 5.3 (L) 05/14/2025         PROBLEMS:     Active Problems:    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    Klippel Trenaunay syndrome    Anemia, unspecified type    Edema of lower extremity    Closed displaced intertrochanteric fracture of left femur, initial encounter (H)      ASSESSMENT AND PLAN:      64 year old female fall yesterday at home 5/13/2025  -Left intertrochanteric femur fracture (prior hardware distal femur dynamic condylar screw, greater than 20  years)  -Treatment options were discussed with Vandana at length, I recommended intramedullary nail fixation of her fracture, nonoperative care for her hip was discussed but recommended against and reasons were given with risks and associated complications associate with nonoperative hip fracture management.  The procedure was discussed in detail as well as potential risks and complications unique to her given her skin condition and chronic lymphedema.  We also discussed possibility of selective/subtotal hardware removal of her distal femur in order to enable passage of new fixation without interference.  She voices understanding.  Her questions were answered.  Consent was reviewed.  The extremity was marked.  -Appreciate medical clearance.  Notes reviewed.  Labs reviewed, hemoglobin 9.3, white blood cell count 5.4, platelet count 202.  Electrolytes reviewed appear within normal limits.  Coag panel normal, type and screen done.  -Admission: inpatient  -Antibiotics: periop ancef planned  -Analgesia: multimodal regimen  -Activity/WB: NWB bedrest for OR, PT/OT postoperatively  -Anticoagulation: SCDs, pharm held for OR, Lovenox postoperatively anticipate  -I recommended operative treatment of her left hip fracture with intramedullary nail fixation, possible selective hardware removal distal femur to accommodate new fixation. Risks, benefits, alternatives, and anticipated postoperative course were discussed. Risks of surgery include, but are not limited to, bleeding, infection, wound healing complications, neurovascular injury, pain, stiffness, arthritis, nonunion, malunion, instability, limb length discrepancy, symptomatic hardware, thromboembolic phenomena, heart attack, stroke, anesthetic and medical complications, and death. Benefits of surgery were discussed including improved chance of union in acceptable alignment, early mobilization. We also discussed therapeutic alternatives to surgery, including non-operative  management, which I did not recommend. They understand these risks and benefits and wish to proceed. Keep NPO pending surgery. Please see operative note for detailed post-operative plan, including post-op weightbearing status.     I have explained in a language understandable to the patient or substitute decision maker:    - The procedure the patient is having and why they need it  - The possible risks of the procedure  - Reasonable alternatives to this procedure, the relevant risks, benefits, and uncertainties  - The significant risks and problems specific to this patient     I have given the patient/substitute decision-maker an opportunity to:  - Ask questions about any of the above information  - Raise any other concerns     I believe the patient/substitute decision-maker understood the above information    Nik Nolasco DO, MSc  5/14/2025 1:55 PM

## 2025-05-14 NOTE — ANESTHESIA PROCEDURE NOTES
Fascia iliaca Procedure Note    Pre-Procedure   Staff -        Anesthesiologist:  Anish Santoro MD       Performed By: Anesthesiologist       Location: pre-op       Pre-Anesthestic Checklist: patient identified, IV checked, site marked, risks and benefits discussed, informed consent, monitors and equipment checked, pre-op evaluation, at physician/surgeon's request and post-op pain management  Timeout:       Correct Patient: Yes        Correct Procedure: Yes        Correct Site: Yes        Correct Position: Yes        Correct Laterality: Yes        Site Marked: Yes  Procedure Documentation  Procedure: Fascia iliaca         Laterality: left       Patient Position: supine       Patient Prep/Sterile Barriers: sterile gloves, mask       Skin prep: Chloraprep       Local skin infiltrated with 3 mL of 1% lidocaine. lower extremity plane block       Needle Type: insulated       Needle Gauge: 22.        Needle Length (millimeters): 90        Ultrasound guided       1. Ultrasound was used to identify targeted nerve, plexus, vascular marker, or fascial plane and place a needle adjacent to it in real-time.       2. Ultrasound was used to visualize the spread of anesthetic in close proximity to the above referenced structure.       3. A permanent image is entered into the patient's record.       4. The visualized anatomic structures appeared normal.       5. There were no apparent abnormal pathologic findings.    Assessment/Narrative         The placement was negative for: blood aspirated, painful injection and site bleeding       Paresthesias: No.       Test dose of mL at.         Test dose negative, 3 minutes after injection, for signs of intravascular, subdural, or intrathecal injection.       Bolus given via needle. no blood aspirated via catheter.        Secured via.        Insertion/Infusion Method: Single Shot       Complications: none       Injection made incrementally with aspirations every 5  "mL.    Medication(s) Administered   Bupivacaine 0.5% w/ 1:400K Epi (Injection) - Injection   20 mL - 5/14/2025 1:40:00 PM    FOR [facility] ([facility]) ONLY:   Pain Team Contact information: please page the Pain Team Via [employer]. Search \"Pain\". During daytime hours, please page the attending first. At night please page the resident first.      "

## 2025-05-14 NOTE — ANESTHESIA CARE TRANSFER NOTE
Patient: Vandana Gonzalez    Procedure: Procedure(s):  OPEN REDUCTION INTERNAL FIXATION, FRACTURE, FEMUR, USING INTRAMEDULLARY SAMI AND FRACTURE TABLE       Diagnosis: Closed displaced intertrochanteric fracture of left femur, initial encounter (H) [S72.142A]  Diagnosis Additional Information: No value filed.    Anesthesia Type:   General     Note:    Oropharynx: oropharynx clear of all foreign objects  Level of Consciousness: awake  Oxygen Supplementation: face mask  Level of Supplemental Oxygen (L/min / FiO2): 6  Independent Airway: airway patency satisfactory and stable  Dentition: dentition unchanged  Vital Signs Stable: post-procedure vital signs reviewed and stable  Report to RN Given: handoff report given  Patient transferred to: PACU    Handoff Report: Identifed the Patient, Identified the Reponsible Provider, Reviewed the pertinent medical history, Discussed the surgical course, Reviewed Intra-OP anesthesia mangement and issues during anesthesia, Set expectations for post-procedure period and Allowed opportunity for questions and acknowledgement of understanding    Vitals:  Vitals Value Taken Time   /63 05/14/25 16:40   Temp     Pulse 67 05/14/25 16:43   Resp 12 05/14/25 16:43   SpO2 100 % 05/14/25 16:43   Vitals shown include unfiled device data.    Electronically Signed By: NIMCO Garcia CRNA  May 14, 2025  4:44 PM

## 2025-05-14 NOTE — CONSULTS
"[facility] Nurse Inpatient Assessment     Consulted for: left leg     Summary: Chronic venous left lower extremity wound with hypergranulation tissue.      [facility] nurse follow-up plan: weekly    Patient History (according to provider note(s):      \"64 year old female with past medical history significant for osteoporosis, breast cancer who presents with fall and left hip pain. Admitted on 5/13/2025.\"    Assessment:      Areas visualized during today's visit: Focused:, Lower extremities , and Left    Wound location: Left anterior/lateral lower extremity     Last photo: 5/14/25    Wound due to: Venous Ulcer and lymphedema  Wound history/plan of care: Optifoam in place. Patient reports wound has been present for ~5 years. Used to follow-up at a wound care clinic. Applies a mepilex dressing and changes every other day. Wears compression stockings. Recommending polymem to facilitate moisture management, address hypergranulation tissue.   Wound base: 80% red, moist; 20% hypergranulation tissue      Palpation of the wound bed: normal      Drainage: moderate     Description of drainage: serous     Measurements (length x width x depth, in cm): ~ 7  x 7.2  x  0.1 cm clustered      Tunneling: N/A     Undermining: N/A  Periwound skin: Intact      Color: pale      Temperature: normal   Odor: none  Pain: denies , none  Pain interventions prior to dressing change: patient tolerated well, no significant pain present , and slow and gentle cares   Treatment goal: Heal , Decrease moisture, and Protection  STATUS: initial assessment and stalled  Supplies ordered: ordered polymem, discussed with RN, and discussed with patient      Treatment Plan:     Left leg wound(s): Every other day and as needed for drainage strike-through   Cleanse with MicroKlenz wound cleanser. Pat dry.    Cut a piece of Polymem Roll (PS#[id number]) to fit the size of the wound. Apply to the wound bed.   Cover with a sacral mepilex " "dressing.     Orders: Written    RECOMMEND PRIMARY TEAM ORDER: None, at this time  Education provided: plan of care, wound progress, and Moisture management  Discussed plan of care with: Patient and Nurse  Notify WO if wound(s) deteriorate.  Nursing to notify the Provider(s) and re-consult the [facility] Nurse if new skin concern.    DATA:     Current support surface: Standard  Standard gel mattress (Isoflex)  Containment of urine/stool: not assessed this visit   BMI: Body mass index is 30.27 kg/m .   Active diet order: Orders Placed This Encounter      NPO for Procedure/Surgery per Anesthesia Guidelines Except for: Meds; Clear liquids before procedure/surgery: ADULT (Age GREATER than or Equal to 18 years) - Clear liquids 2 hours before procedure/surgery     Output: I/O last 3 completed shifts:  In: -   Out: 900 [Urine:900]     Labs:   Recent Labs   Lab 05/14/25  0751 05/13/25  0300   HGB 9.3* 9.3*   INR  --  1.08   WBC 5.4 8.5     Pressure injury risk assessment:   Sensory Perception: 4-->no impairment  Moisture: 3-->occasionally moist  Activity: 1-->bedfast  Mobility: 1-->completely immobile  Nutrition: 2-->probably inadequate  Friction and Shear: 1-->problem  Ruben Score: 12    Jenna Hendrix RN, CWON  Please contact via [employer] at group \"[facility] nurse\"- M-F 8A-4P  "

## 2025-05-14 NOTE — ANESTHESIA POSTPROCEDURE EVALUATION
Patient: Vandana Gonzalez    Procedure: Procedure(s):  OPEN REDUCTION INTERNAL FIXATION, FRACTURE, FEMUR, USING INTRAMEDULLARY SAMI AND FRACTURE TABLE       Anesthesia Type:  General    Note:  Disposition: Inpatient   Postop Pain Control: Uneventful            Sign Out: Well controlled pain   PONV: No   Neuro/Psych: Uneventful            Sign Out: Acceptable/Baseline neuro status   Airway/Respiratory: Uneventful            Sign Out: Acceptable/Baseline resp. status   CV/Hemodynamics: Uneventful            Sign Out: Acceptable CV status   Other NRE: NONE   DID A NON-ROUTINE EVENT OCCUR? No           Last vitals:  Vitals Value Taken Time   /62 05/14/25 16:45   Temp 36.1  C (96.98  F) 05/14/25 16:56   Pulse 72 05/14/25 16:56   Resp 31 05/14/25 16:56   SpO2 96 % 05/14/25 16:54   Vitals shown include unfiled device data.    Electronically Signed By: Omid Lim MD  May 14, 2025  4:57 PM

## 2025-05-15 ENCOUNTER — APPOINTMENT (OUTPATIENT)
Dept: PHYSICAL THERAPY | Facility: CLINIC | Age: 65
DRG: 481 | End: 2025-05-15
Payer: COMMERCIAL

## 2025-05-15 VITALS
HEIGHT: 69 IN | SYSTOLIC BLOOD PRESSURE: 100 MMHG | BODY MASS INDEX: 30.36 KG/M2 | HEART RATE: 74 BPM | RESPIRATION RATE: 16 BRPM | OXYGEN SATURATION: 95 % | DIASTOLIC BLOOD PRESSURE: 60 MMHG | TEMPERATURE: 97.9 F | WEIGHT: 205 LBS

## 2025-05-15 LAB
ANION GAP SERPL CALCULATED.3IONS-SCNC: 10 MMOL/L (ref 7–15)
BLD PROD TYP BPU: NORMAL
BLOOD COMPONENT TYPE: NORMAL
BUN SERPL-MCNC: 11.7 MG/DL (ref 8–23)
CALCIUM SERPL-MCNC: 8.2 MG/DL (ref 8.8–10.4)
CHLORIDE SERPL-SCNC: 105 MMOL/L (ref 98–107)
CODING SYSTEM: NORMAL
CREAT SERPL-MCNC: 0.77 MG/DL (ref 0.51–0.95)
CROSSMATCH: NORMAL
EGFRCR SERPLBLD CKD-EPI 2021: 86 ML/MIN/1.73M2
ERYTHROCYTE [DISTWIDTH] IN BLOOD BY AUTOMATED COUNT: 18.6 % (ref 10–15)
GLUCOSE BLDC GLUCOMTR-MCNC: 121 MG/DL (ref 70–99)
GLUCOSE SERPL-MCNC: 108 MG/DL (ref 70–99)
HCO3 SERPL-SCNC: 20 MMOL/L (ref 22–29)
HCT VFR BLD AUTO: 23.7 % (ref 35–47)
HGB BLD-MCNC: 7.3 G/DL (ref 11.7–15.7)
HGB BLD-MCNC: 8 G/DL (ref 11.7–15.7)
ISSUE DATE AND TIME: NORMAL
MCH RBC QN AUTO: 27.1 PG (ref 26.5–33)
MCHC RBC AUTO-ENTMCNC: 30.8 G/DL (ref 31.5–36.5)
MCV RBC AUTO: 88 FL (ref 78–100)
MCV RBC AUTO: 90 FL (ref 78–100)
PLATELET # BLD AUTO: 168 10E3/UL (ref 150–450)
POTASSIUM SERPL-SCNC: 3.7 MMOL/L (ref 3.4–5.3)
RBC # BLD AUTO: 2.69 10E6/UL (ref 3.8–5.2)
SODIUM SERPL-SCNC: 135 MMOL/L (ref 135–145)
UNIT ABO/RH: NORMAL
UNIT NUMBER: NORMAL
UNIT STATUS: NORMAL
UNIT TYPE ISBT: 5100
WBC # BLD AUTO: 7.8 10E3/UL (ref 4–11)

## 2025-05-15 PROCEDURE — 80048 BASIC METABOLIC PNL TOTAL CA: CPT

## 2025-05-15 PROCEDURE — 97530 THERAPEUTIC ACTIVITIES: CPT | Mod: GP

## 2025-05-15 PROCEDURE — 250N000013 HC RX MED GY IP 250 OP 250 PS 637

## 2025-05-15 PROCEDURE — 97162 PT EVAL MOD COMPLEX 30 MIN: CPT | Mod: GP

## 2025-05-15 PROCEDURE — 36415 COLL VENOUS BLD VENIPUNCTURE: CPT

## 2025-05-15 PROCEDURE — 36415 COLL VENOUS BLD VENIPUNCTURE: CPT | Performed by: INTERNAL MEDICINE

## 2025-05-15 PROCEDURE — 250N000011 HC RX IP 250 OP 636

## 2025-05-15 PROCEDURE — 250N000011 HC RX IP 250 OP 636: Performed by: PHYSICIAN ASSISTANT

## 2025-05-15 PROCEDURE — P9016 RBC LEUKOCYTES REDUCED: HCPCS | Performed by: INTERNAL MEDICINE

## 2025-05-15 PROCEDURE — 85018 HEMOGLOBIN: CPT

## 2025-05-15 PROCEDURE — 99233 SBSQ HOSP IP/OBS HIGH 50: CPT | Performed by: INTERNAL MEDICINE

## 2025-05-15 PROCEDURE — 85018 HEMOGLOBIN: CPT | Performed by: INTERNAL MEDICINE

## 2025-05-15 PROCEDURE — 120N000001 HC R&B MED SURG/OB

## 2025-05-15 RX ORDER — ENOXAPARIN SODIUM 100 MG/ML
40 INJECTION SUBCUTANEOUS EVERY 24 HOURS
Status: DISCONTINUED | OUTPATIENT
Start: 2025-05-15 | End: 2025-05-19 | Stop reason: HOSPADM

## 2025-05-15 RX ADMIN — OXYCODONE HYDROCHLORIDE 10 MG: 5 TABLET ORAL at 16:04

## 2025-05-15 RX ADMIN — OXYCODONE HYDROCHLORIDE 10 MG: 5 TABLET ORAL at 20:06

## 2025-05-15 RX ADMIN — ACETAMINOPHEN 650 MG: 325 TABLET, FILM COATED ORAL at 11:52

## 2025-05-15 RX ADMIN — ACETAMINOPHEN 650 MG: 325 TABLET, FILM COATED ORAL at 20:01

## 2025-05-15 RX ADMIN — SENNOSIDES AND DOCUSATE SODIUM 1 TABLET: 50; 8.6 TABLET ORAL at 08:08

## 2025-05-15 RX ADMIN — SUCRALFATE 1 G: 1 TABLET ORAL at 11:52

## 2025-05-15 RX ADMIN — SENNOSIDES AND DOCUSATE SODIUM 1 TABLET: 50; 8.6 TABLET ORAL at 20:01

## 2025-05-15 RX ADMIN — ENOXAPARIN SODIUM 40 MG: 40 INJECTION SUBCUTANEOUS at 21:03

## 2025-05-15 RX ADMIN — OXYCODONE HYDROCHLORIDE 10 MG: 5 TABLET ORAL at 11:51

## 2025-05-15 RX ADMIN — OXYCODONE HYDROCHLORIDE 10 MG: 5 TABLET ORAL at 03:54

## 2025-05-15 RX ADMIN — SUCRALFATE 1 G: 1 TABLET ORAL at 21:03

## 2025-05-15 RX ADMIN — CEFAZOLIN SODIUM 2 G: 2 SOLUTION INTRAVENOUS at 06:04

## 2025-05-15 RX ADMIN — OXYCODONE HYDROCHLORIDE 10 MG: 5 TABLET ORAL at 08:02

## 2025-05-15 RX ADMIN — PANTOPRAZOLE SODIUM 40 MG: 40 TABLET, DELAYED RELEASE ORAL at 08:08

## 2025-05-15 RX ADMIN — POLYETHYLENE GLYCOL 3350 17 G: 17 POWDER, FOR SOLUTION ORAL at 08:07

## 2025-05-15 RX ADMIN — ACETAMINOPHEN 650 MG: 325 TABLET, FILM COATED ORAL at 03:54

## 2025-05-15 RX ADMIN — TAMOXIFEN CITRATE 20 MG: 10 TABLET, FILM COATED ORAL at 08:07

## 2025-05-15 RX ADMIN — SUCRALFATE 1 G: 1 TABLET ORAL at 08:08

## 2025-05-15 RX ADMIN — PANTOPRAZOLE SODIUM 40 MG: 40 TABLET, DELAYED RELEASE ORAL at 20:01

## 2025-05-15 ASSESSMENT — ACTIVITIES OF DAILY LIVING (ADL)
ADLS_ACUITY_SCORE: 51

## 2025-05-15 NOTE — PLAN OF CARE
Goal Outcome Evaluation:  Denies CP, SOB. All pt needs met at this time. Pt verbalized that her leg port wine color appears baseline per pt. Toes are more pink today than yesterday (was a little more purple yesterday evening). Wound care done on L leg wound per WOC order.  Violeta messaged [facility] about Hgb 7.3 and she recommended to contact hospitalist. Hospitalist put in an order for 1 unit PRBC. Called Allan at 1645 to see if she wanted the Lovenox in her notes and she said she would add it later.  1 unit PRBC completed.

## 2025-05-15 NOTE — PROGRESS NOTES
POD#: 1  Mental Status: A/O x4  Activity/dangle: Did not get out of bed during shift  Diet: Regular   Pain: PRN Oxy given x2  Lemons/Voiding: Purewick in place  02/LDA: On 2 L O2, (Tried to wean off but SPO2 drops with mid 80's. RA is baseline). PIV on Right wrist, SL  D/C Date: Pending

## 2025-05-15 NOTE — PROGRESS NOTES
Orthopedic Surgery  Vandana Gonzalez  05/15/2025     Admit Date:  5/13/2025  Assessment:   POD: 1 Day Post-Op   Procedure(s):  OPEN REDUCTION INTERNAL FIXATION, FRACTURE, FEMUR, USING INTRAMEDULLARY SAMI AND FRACTURE TABLE, LEFT     Patient working with therapy to sit on the edge of the bed  Pain controlled  Tolerating oral intake, but not really hungry  Denies nausea or vomiting  Denies chest pain or shortness of breath    Temp:  [97  F (36.1  C)-98.8  F (37.1  C)] 98.5  F (36.9  C)  Pulse:  [70-98] 88  Resp:  [8-16] 16  BP: ()/(53-63) 97/57  SpO2:  [92 %-100 %] 94 %    Alert and oriented  Dressing is clean, dry, and intact   Minimal erythema of the surrounding skin   Bilateral calves are soft, non-tender  Left lower extremity is NVI  Sensation intact bilateral lower extremities  Patient able to resist dorsi and plantar flexion bilaterally  +Dp pulse    Labs:  Recent Labs   Lab Test 05/15/25  1106 05/14/25  2115 05/14/25  0751 05/13/25  0300   WBC 7.8  --  5.4 8.5   HGB 7.3* 8.5* 9.3* 9.3*     --  202 239     Recent Labs   Lab Test 05/13/25  0300 10/07/21  0554   INR 1.08 1.12         Plan:   Continue SCD's and Lovenox daily for DVT prophylaxis x 6 weeks    Mobilize with PT/OT, fall precautions   WBAT with assistive device     Continue current pain regimen   Dressings: Keep intact.  Change if >60% saturated or peeling off    Follow-up: 2 weeks post-op with Miley/Dr Nolasco's team   Social work coordinating discharge planning    Disposition:    Anticipate d/c to TCU when medically cleared and progressing in PT    Kelsy Wolff PA-C  [facility]

## 2025-05-15 NOTE — PROGRESS NOTES
[facility]    Medicine Progress Note - Hospitalist Service    Date of Admission:  5/13/2025    Assessment & Plan   Vandana Gonzalez is a 64 year old female with past medical history significant for osteoporosis, breast cancer who presents with fall and left hip pain. Admitted on 5/13/2025.     Acute left intertrochanteric femur fracture, mildly displaced  S/p  Left intramedullary nail fixation on 5/14/25  Mechanical fall  Osteoporosis      *L hip XRay 5/12/25 =    mildly displaced left intertrochanteric hip fracture  *RCRI 0. Denies any chest pain or shortness of breath.   Can do work of 4+ mets at baseline.   EKG 5/12/25 is NSR  - Orthopedic surgery consulted, IM nail fixation 5/14.       -  post op orders  (pain, activity, wound care, VTE prophyllaxis.) per ortho surgeon     - Scheduled acetaminophen   - Oxycodone PO PRN, hydromorphone IV PRN        Post op anemia  Acute on chronic anemia  H/o esophageal ulcers  H/o Low B12   -Hgb 9.3 --> 8.5 --> 7.3  -500 cc blood loss intra-op  -BPs soft, losartan on hold  - 1 U PRBC ordered 5/15/25  - bid Hgb check     - chronic anemia due to CONRADO (esophageal ulcer history), low B12 (=185 in 2022)  - B12 962 and folate level 33 this admission    - continue pta sucralfate and protonix  - watch for signs/symptoms of ongoing bleeding    Klippel Trenaunay Syndrome  ( 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   Chronic lymphedema, L>R  Chronic left lower extremity wound, POA  Chronic port wine discoloration left leg with lymphedema and chronic ulceration   - Wound RN consulted     Breast cancer  -Right breast Invasive Ductal Carcinoma ER positive, HI negative, HER2 negative   Stage IA (pT1b, pN0(sn), cM0, G2, Oncotype DX score: 26)   - R lumpectomy 11/2024,  status post radiation 1/2025  -Follows with  oncology. Treated with lumpectomy,  "radiation, tamoxifen.   - Continue prior to admission tamoxifen      Hypertension  - Hold prior to admission telmisartan pending surgery    GERD  - Continue prior to admission PPI      Alcohol overuse  Reports drinking 2-3 beers daily. Denies any history of withdrawal.  - Discussed recommended alcohol limits  - Monitor clinically for any signs of withdrawal           Diet: Advance Diet as Tolerated: Regular Diet Adult    DVT Prophylaxis: Pneumatic Compression Devices  Lemons Catheter: Not present  Lines: None     Cardiac Monitoring: None  Code Status: Full Code      Clinically Significant Risk Factors                   # Hypertension: Noted on problem list            # Obesity: Estimated body mass index is 30.27 kg/m  as calculated from the following:    Height as of this encounter: 1.753 m (5' 9\").    Weight as of this encounter: 93 kg (205 lb)., PRESENT ON ADMISSION            Social Drivers of Health    Physical Activity: Patient Declined (10/4/2024)    Exercise Vital Sign     Days of Exercise per Week: Patient declined     Minutes of Exercise per Session: Patient declined   Stress: Stress Concern Present (10/4/2024)    [place] [employer] - Occupational Stress Questionnaire     Feeling of Stress : To some extent   Social Connections: Unknown (10/4/2024)    Social Connection and Isolation Panel [NHANES]     Frequency of Social Gatherings with Friends and Family: Patient declined          Disposition Plan     Medically Ready for Discharge: Anticipated Tomorrow if Hgb stable.  Anticipate TCU             Maddi Gomez MD  Hospitalist Service  [facility]  Securely message with [employer] (more info)  Text page via [facility] Paging/Directory   ______________________________________________________________________    Interval History   Pain slightly better after surgery.   Tired.  Hgb low at 7.3.      Physical Exam   Vital Signs: Temp: 99.1  F (37.3  C) Temp src: Oral BP: " 110/57 Pulse: 88   Resp: 16 SpO2: 95 % O2 Device: None (Room air) Oxygen Delivery: 2 LPM  Weight: 205 lbs 0 oz  Constitutional: Well-appearing, NAD   mild-mod pain at rest   pale  Respiratory: Clear to auscultation bilaterally, good air movement, normal effort   Cardiovascular: RRR, no m/r/g. Bilateral lower extremity edema L >> R  GI: Soft, non-tender, non-distended. No rebound tenderness or guarding.    Skin: Warm, dry. Port wine discoloration throughout left lower extremity. Wound left lateral shin.   Neurologic: Alert. Responding to questions appropriately.  Normal speech  MS:   DC has chronic edema and multiple surgical wounds from previous vein surgeries and a major surgery for a L femur fracture in past.    Psychiatric: Normal affect, appropriate       Medical Decision Making       55 MINUTES SPENT BY ME on the date of service doing chart review, history, exam, documentation & further activities per the note.      Data     I have personally reviewed the following data over the past 24 hrs:    7.8  \   7.3 (L)   / 168     135 105 11.7 /  108 (H)   3.7 20 (L) 0.77 \       Imaging results reviewed over the past 24 hrs:   Recent Results (from the past 24 hours)   XR Surgery COURTNEY L/T 5 Min Fluoro w Stills    Narrative    This exam was marked as non-reportable because it will not be read by a   radiologist or a [place] non-radiologist provider.

## 2025-05-15 NOTE — PROGRESS NOTES
05/15/25 1200   Appointment Info   Signing Clinician's Name / Credentials (PT) Tasneem Gordon, PT, DPT   Rehab Comments (PT) WBAT LLE   Living Environment   People in Home spouse   Current Living Arrangements house   Home Accessibility stairs within home   Number of Stairs, Within Home, Primary greater than 10 stairs  (8 B rail (but too wide to reach) + 6 R rail)   Stair Railings, Within Home, Primary railings safe and in good condition   Transportation Anticipated car, drives self;family or friend will provide   Living Environment Comments Patient lives in a multi-level home with her .  There are 8 steps from door to living area and an additional 6 steps up to bedroom.  Patient reports there are B rails for 8 steps but only able to reach one at a time. There is a right rail for 6 steps up to bedroom.  Patient's bathroom has a tub shower with shower chair and comfort height toilet.  She sleeps in a standard bed (exits to right).   Self-Care   Usual Activity Tolerance good   Current Activity Tolerance fair   Regular Exercise No   Equipment Currently Used at Home cane, straight   Fall history within last six months yes   Number of times patient has fallen within last six months 1   Activity/Exercise/Self-Care Comment Patient is typically IND ADLs and uses cane.  She shares household chores with her .   does most of the grocery shopping.   General Information   Onset of Illness/Injury or Date of Surgery 05/13/25   Referring Physician Miley Kearney PA-C   Patient/Family Therapy Goals Statement (PT) Patient wants to reduce pain in LLE.   Pertinent History of Current Problem (include personal factors and/or comorbidities that impact the POC) 64 year old female who initially presented to the ER yesterday 5/13/2025 after a fall tripping over a bathroom rug. Now s/p left intramedullary nail fixation on 5/14/25.   Existing Precautions/Restrictions fall;weight bearing   Weight-Bearing Status - LLE  weight-bearing as tolerated   Cognition   Affect/Mental Status (Cognition) WFL   Orientation Status (Cognition) oriented x 3   Follows Commands (Cognition) [facility]   Pain Assessment   Patient Currently in Pain Yes, see Vital Sign flowsheet  (6/10 L knee, received pain medication (oxy) prior to session)   Integumentary/Edema   Integumentary/Edema Comments Age-related skin changes, chronic LLE lymphedema, chronic port wine discoloration LLE, L thigh surgical incisions   Posture    Posture Forward head position;Protracted shoulders   Range of Motion (ROM)   Range of Motion ROM deficits secondary to weakness;ROM deficits secondary to swelling;ROM deficits secondary to pain   ROM Comment LLE active hip and knee flexion severely limited.   Strength (Manual Muscle Testing)   Strength (Manual Muscle Testing) Able to perform R SLR;Deficits observed during functional mobility   Strength Comments Unable to lift LLE.   Bed Mobility   Comment, (Bed Mobility) MaxAx1 to assist LLE to EOB, requires increased time and heavy UE support to perform supine > sit.   Transfers   Comment, (Transfers) ModAx1 sit > stand from elevated bed.   Gait/Stairs (Locomotion)   Comment, (Gait/Stairs) Able to stand with FWW, heavy UE support on walker.  Very painful weight bearing LLE, becomes lightheaded.  Unable to progress to ambulating.   Balance   Balance Comments Impaired dynamic balance, uses cane at baseline but needing FWW following LLE ORIF.   Sensory Examination   Sensory Perception patient reports no sensory changes   Clinical Impression   Criteria for Skilled Therapeutic Intervention Yes, treatment indicated   PT Diagnosis (PT) Impaired functional mobility   Influenced by the following impairments LLE pain, chronic LLE discoloration and edema, weakness, deconditioning, impaired balance, decreased activity tolerance   Functional limitations due to impairments Impaired independence with bed mobility, transfers, gait, and stairs   Clinical  Presentation (PT Evaluation Complexity) evolving   Clinical Presentation Rationale Clinical judgement, PMH, social support   Clinical Decision Making (Complexity) moderate complexity   Planned Therapy Interventions (PT) balance training;bed mobility training;gait training;home exercise program;neuromuscular re-education;patient/family education;strengthening;transfer training;progressive activity/exercise;cryotherapy;postural re-education;stair training   Risk & Benefits of therapy have been explained evaluation/treatment results reviewed;care plan/treatment goals reviewed;risks/benefits reviewed;participants voiced agreement with care plan;participants included;patient   PT Total Evaluation Time   PT Yulia, Moderate Complexity Minutes (66840) 12   Physical Therapy Goals   PT Frequency 5x/week   PT Predicted Duration/Target Date for Goal Attainment 05/22/25   PT Goals Bed Mobility;Transfers;Gait;Stairs   PT: Bed Mobility Minimal assist;Supine to/from sit;Rolling   PT: Transfers Minimal assist;Sit to/from stand;Bed to/from chair;Assistive device   PT: Gait Minimal assist;50 feet;Rolling walker   PT: Stairs Minimal assist;8 stairs;Rail on both sides   Interventions   Interventions Quick Adds Therapeutic Activity   Therapeutic Activity   Therapeutic Activities: dynamic activities to improve functional performance Minutes (79794) 39   Symptoms Noted During/After Treatment Fatigue;Increased pain   Treatment Detail/Skilled Intervention Patient supine in bed on arrival.  Reporting severe LLE despite pain medications prior to session.  Patient fearful of movement, educated on orders for WBAT LLE.  LLE very red and edematous, patient reporting baseline.  Limited L ankle dorsi-/plantarflexion due to pain, unable to actively flex left hip or left knee.  Patient encouraged to continue ankle pumps to promote circulation for DVT prevention.  Patient needing significant time (~4 minutes) to perform supine > sit despite maxA to  assist LLE to EOB.  SBA for sitting balance.  Patient using BUE to assist with positioning L foot on floor, staggered foot position with L foot ahead.  Patient attempting sit > stand from elevated bed without assist but unable to clear hips, limited by severe LLE pain.  Needing modAx1 for x2 sit <> stands, unable to slide L foot under body.  Maintain stand for <10 seconds before reporting lightheadedness and assisted back to sitting with minAx1 and cues to reach hand back for bed.  Patient unable to progress to ambulating.  Patient needing almost 10 minutes to return to bed, maxA at LLE.  Ice applied to LLE for pain reduction, declining pillow under LLE.  Left with call light in reach and bed alarm activated.  Discussed TCU recommendation at end of session.   PT Discharge Planning   PT Plan Repeated sit <> stands FWW vs SS, pre-gait as able, LE exercises   PT Discharge Recommendation (DC Rec) Transitional Care Facility   PT Rationale for DC Rec Patient is significantly below her IND/mod I prior level of function, able to perform sit > stand with mod A but unable to progress to ambulation.  Patient lives in multi-level home with her spouse, who she reports is currently sick and in the hospital.  Patient with limited social support outside of spouse.  Anticipate need for TCU to promote safety and independence with mobility.   PT Brief overview of current status Ax2 SS. Goals of therapy will be to address safe mobility and make recs for d/c to next level of care. Pt and RN will continue to follow all falls risk precautions as documented by RN staff while hospitalized.   PT Total Distance Amb During Session (feet) 0   Physical Therapy Time and Intention   Timed Code Treatment Minutes 39   Total Session Time (sum of timed and untimed services) 51

## 2025-05-16 ENCOUNTER — APPOINTMENT (OUTPATIENT)
Dept: PHYSICAL THERAPY | Facility: CLINIC | Age: 65
DRG: 481 | End: 2025-05-16
Payer: COMMERCIAL

## 2025-05-16 ENCOUNTER — APPOINTMENT (OUTPATIENT)
Dept: OCCUPATIONAL THERAPY | Facility: CLINIC | Age: 65
DRG: 481 | End: 2025-05-16
Payer: COMMERCIAL

## 2025-05-16 LAB
FERRITIN SERPL-MCNC: 56 NG/ML (ref 11–328)
GLUCOSE BLDC GLUCOMTR-MCNC: 126 MG/DL (ref 70–99)
HGB BLD-MCNC: 7.5 G/DL (ref 11.7–15.7)
HGB BLD-MCNC: 8.3 G/DL (ref 11.7–15.7)
IRON BINDING CAPACITY (ROCHE): 232 UG/DL (ref 240–430)
IRON SATN MFR SERPL: 6 % (ref 15–46)
IRON SERPL-MCNC: 15 UG/DL (ref 37–145)
MCV RBC AUTO: 88 FL (ref 78–100)
MCV RBC AUTO: 89 FL (ref 78–100)
POTASSIUM SERPL-SCNC: 3.6 MMOL/L (ref 3.4–5.3)
POTASSIUM SERPL-SCNC: 3.8 MMOL/L (ref 3.4–5.3)

## 2025-05-16 PROCEDURE — 120N000001 HC R&B MED SURG/OB

## 2025-05-16 PROCEDURE — 36415 COLL VENOUS BLD VENIPUNCTURE: CPT | Performed by: INTERNAL MEDICINE

## 2025-05-16 PROCEDURE — 97110 THERAPEUTIC EXERCISES: CPT | Mod: GO

## 2025-05-16 PROCEDURE — 258N000003 HC RX IP 258 OP 636: Performed by: INTERNAL MEDICINE

## 2025-05-16 PROCEDURE — 250N000013 HC RX MED GY IP 250 OP 250 PS 637

## 2025-05-16 PROCEDURE — 85018 HEMOGLOBIN: CPT | Performed by: INTERNAL MEDICINE

## 2025-05-16 PROCEDURE — 999N000127 HC STATISTIC PERIPHERAL IV START W US GUIDANCE

## 2025-05-16 PROCEDURE — 97165 OT EVAL LOW COMPLEX 30 MIN: CPT | Mod: GO

## 2025-05-16 PROCEDURE — 84132 ASSAY OF SERUM POTASSIUM: CPT | Performed by: INTERNAL MEDICINE

## 2025-05-16 PROCEDURE — 97530 THERAPEUTIC ACTIVITIES: CPT | Mod: GO

## 2025-05-16 PROCEDURE — 82728 ASSAY OF FERRITIN: CPT | Performed by: INTERNAL MEDICINE

## 2025-05-16 PROCEDURE — 83550 IRON BINDING TEST: CPT | Performed by: INTERNAL MEDICINE

## 2025-05-16 PROCEDURE — 99255 IP/OBS CONSLTJ NEW/EST HI 80: CPT | Performed by: INTERNAL MEDICINE

## 2025-05-16 PROCEDURE — 97530 THERAPEUTIC ACTIVITIES: CPT | Mod: GP | Performed by: PHYSICAL THERAPIST

## 2025-05-16 PROCEDURE — 99233 SBSQ HOSP IP/OBS HIGH 50: CPT | Performed by: INTERNAL MEDICINE

## 2025-05-16 PROCEDURE — 999N000040 HC STATISTIC CONSULT NO CHARGE VASC ACCESS

## 2025-05-16 PROCEDURE — 250N000011 HC RX IP 250 OP 636: Mod: JZ | Performed by: INTERNAL MEDICINE

## 2025-05-16 RX ORDER — DIPHENHYDRAMINE HYDROCHLORIDE 50 MG/ML
25 INJECTION, SOLUTION INTRAMUSCULAR; INTRAVENOUS
Status: DISCONTINUED | OUTPATIENT
Start: 2025-05-16 | End: 2025-05-19

## 2025-05-16 RX ORDER — METHYLPREDNISOLONE SODIUM SUCCINATE 40 MG/ML
40 INJECTION INTRAMUSCULAR; INTRAVENOUS
Status: DISCONTINUED | OUTPATIENT
Start: 2025-05-16 | End: 2025-05-19

## 2025-05-16 RX ORDER — DIPHENHYDRAMINE HYDROCHLORIDE 50 MG/ML
50 INJECTION, SOLUTION INTRAMUSCULAR; INTRAVENOUS
Status: DISCONTINUED | OUTPATIENT
Start: 2025-05-16 | End: 2025-05-19

## 2025-05-16 RX ORDER — ALBUTEROL SULFATE 0.83 MG/ML
2.5 SOLUTION RESPIRATORY (INHALATION)
Status: DISCONTINUED | OUTPATIENT
Start: 2025-05-16 | End: 2025-05-19

## 2025-05-16 RX ORDER — MEPERIDINE HYDROCHLORIDE 25 MG/ML
25 INJECTION INTRAMUSCULAR; INTRAVENOUS; SUBCUTANEOUS
Refills: 0 | Status: DISCONTINUED | OUTPATIENT
Start: 2025-05-16 | End: 2025-05-19

## 2025-05-16 RX ORDER — FERROUS SULFATE 325(65) MG
325 TABLET ORAL EVERY OTHER DAY
Status: DISCONTINUED | OUTPATIENT
Start: 2025-05-17 | End: 2025-05-17

## 2025-05-16 RX ORDER — FERROUS SULFATE 325(65) MG
325 TABLET ORAL EVERY OTHER DAY
Status: DISCONTINUED | OUTPATIENT
Start: 2025-05-16 | End: 2025-05-16

## 2025-05-16 RX ORDER — ALBUTEROL SULFATE 90 UG/1
1-2 INHALANT RESPIRATORY (INHALATION)
Status: DISCONTINUED | OUTPATIENT
Start: 2025-05-16 | End: 2025-05-19

## 2025-05-16 RX ADMIN — SUCRALFATE 1 G: 1 TABLET ORAL at 21:43

## 2025-05-16 RX ADMIN — PANTOPRAZOLE SODIUM 40 MG: 40 TABLET, DELAYED RELEASE ORAL at 09:04

## 2025-05-16 RX ADMIN — SUCRALFATE 1 G: 1 TABLET ORAL at 12:41

## 2025-05-16 RX ADMIN — OXYCODONE HYDROCHLORIDE 10 MG: 5 TABLET ORAL at 10:52

## 2025-05-16 RX ADMIN — SUCRALFATE 1 G: 1 TABLET ORAL at 09:04

## 2025-05-16 RX ADMIN — OXYCODONE HYDROCHLORIDE 10 MG: 5 TABLET ORAL at 03:32

## 2025-05-16 RX ADMIN — OXYCODONE HYDROCHLORIDE 10 MG: 5 TABLET ORAL at 15:16

## 2025-05-16 RX ADMIN — TAMOXIFEN CITRATE 20 MG: 10 TABLET, FILM COATED ORAL at 09:04

## 2025-05-16 RX ADMIN — OXYCODONE HYDROCHLORIDE 10 MG: 5 TABLET ORAL at 19:56

## 2025-05-16 RX ADMIN — POLYETHYLENE GLYCOL 3350 17 G: 17 POWDER, FOR SOLUTION ORAL at 09:04

## 2025-05-16 RX ADMIN — PANTOPRAZOLE SODIUM 40 MG: 40 TABLET, DELAYED RELEASE ORAL at 18:35

## 2025-05-16 RX ADMIN — ACETAMINOPHEN 650 MG: 325 TABLET, FILM COATED ORAL at 21:42

## 2025-05-16 RX ADMIN — SENNOSIDES AND DOCUSATE SODIUM 1 TABLET: 50; 8.6 TABLET ORAL at 21:42

## 2025-05-16 RX ADMIN — ACETAMINOPHEN 650 MG: 325 TABLET, FILM COATED ORAL at 12:41

## 2025-05-16 RX ADMIN — ACETAMINOPHEN 650 MG: 325 TABLET, FILM COATED ORAL at 03:29

## 2025-05-16 RX ADMIN — SUCRALFATE 1 G: 1 TABLET ORAL at 18:35

## 2025-05-16 RX ADMIN — SENNOSIDES AND DOCUSATE SODIUM 1 TABLET: 50; 8.6 TABLET ORAL at 09:04

## 2025-05-16 ASSESSMENT — ACTIVITIES OF DAILY LIVING (ADL)
ADLS_ACUITY_SCORE: 51
DEPENDENT_IADLS:: INDEPENDENT
ADLS_ACUITY_SCORE: 51
PREVIOUS_RESPONSIBILITIES: MEAL PREP;HOUSEKEEPING;LAUNDRY;SHOPPING;MEDICATION MANAGEMENT;FINANCES;DRIVING
ADLS_ACUITY_SCORE: 51

## 2025-05-16 NOTE — CONSULTS
[facility] / Oncology  Progress Note  Name: Vandana Gonzalez  :  1960  MRN:  [id number]    --------------------    Assessment / Plan:  Iron Deficiency Anemia, likely poor PO absorption, microscopic losses and post-operative.  Esophageal stenosis requiring dilatation.  Early stage, hormone positive breast cancer.  Status post lumpectomy.  Status post adjuvant radiotherapy.  Ongoing endocrine therapy.  Left intertrochanteric femur fracture, mildly displaced.  Status post left intramedullary nail fixation.  Klippel Trenaunay syndrome.    Vandana and I reviewed that there are numerous potential causes for her anemia, but based on her ferritin and iron panel.  Fortunately multiple past EGDs and colonoscopies have been reassuring.  With her long longstanding PPI use and long-term use of regular daily (sometimes more often) Carafate, she is also again at increased risk for poor iron absorption.  She does have a longstanding anemia under the care of Dr. Galvan who can safely assume her care moving forward following discharge.  I suspect that postoperative bleeding likely exacerbated her underlying anemia above.  For now, I think we can start her on daily Venofer; we reviewed the logistics of administration as well as potential side effects.  Of note, her calculated iron deficit is around 3834-9348 mg and she will need  total of 5 doses of Venofer to be completed between inpatient and outpatient.  Obviously, the ultimate test is just how well she responds to parenteral iron if she has a beautiful response, that is the ultimate goal.  But if she has a suboptimal response and remains anemic, additional workup may be required.    Marcos Nunn MD.    --------------------    Interval History:  Vandana presents for consultation regarding iron deficiency anemia.  She unfortunately experienced a fall followed by left hip pain was found to have a left intertrochanteric femur fracture mildly displaced requiring a  nail fixation on 5/14/2025.  She does have osteoporosis as well underlying this.  She was noticed to have symptomatic anemia postoperatively accompanied by a 500 cc blood loss intraoperatively.  She received 1 unit of packed red blood cells on 5/15/2025.  She does have a history of low B12 in the past with a normal B12 and folate acid level on recheck.  She has a ferritin of 9 and an iron panel consistent with iron deficiency anemia.  She does not eat a lot of red meat.  She does not report any melena or bright red blood per rectum.  She has a longstanding history of heartburn symptoms and uses daily pantoprazole as well as pretty frequent Carafate recommended by her gastroenterology team.  She has never had throat or abdominal surgery.  She does have a history of a tight esophagus requiring dilatation.  Past colonoscopies have been reassuring.  Past endoscopies have been reassuring outside of requiring dilatation.    --------------------    Review of Systems:  10 point ROS negative except for that above.    Past Medical / Surgical History:  Past Medical History:   Diagnosis Date    Closed fracture of unspecified part of neck of femur 11/2002    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, unspecified site     large angioma adomen    Hypertensive disorder     Klippel Trenaunay disease 2002    AVM left leg    Phlebitis and thrombophlebitis of other deep vessels of lower extremities 06/2002    AVM left leg    Stasis ulcer of lower extremity (H) 2012    Left-recurrent     Past Surgical History:   Procedure Laterality Date    ARTHROPLASTY HIP ANTERIOR Right 10/28/2021    Procedure: ARTHROPLASTY, RIGHT TOTAL HIP, DIRECT ANTERIOR APPROACH;  Surgeon: Jimmy Pena MD;  Location: [facility] OR    BIOPSY OF BREAST, INCISIONAL  07/2009    phyllodes tumor left    BREAST SURGERY  07/2009    COLONOSCOPY  3-2022    ESOPHAGOSCOPY, GASTROSCOPY, DUODENOSCOPY (EGD), COMBINED N/A 10/07/2021    Procedure:  "ESOPHAGOGASTRODUODENOSCOPY (EGD);  Surgeon: Romeo Chavez MD;  Location:  GI    FEMUR SURGERY       left side     OPEN REDUCTION INTERNAL FIXATION RODDING INTRAMEDULLAR FEMUR FRACTURE TABLE Left 2025    Procedure: OPEN REDUCTION INTERNAL FIXATION, FRACTURE, FEMUR, USING INTRAMEDULLARY SAMI AND FRACTURE TABLE;  Surgeon: Nik Nolasco MD;  Location:  OR    ORTHOPEDIC SURGERY  2002    repair broken femur    ZZC LIGATN FEMORAL VEIN      multiple vein strippings left    ZZHC UGI ENDOSCOPY, SIMPLE EXAM  2002    esophageal stricture     Patient Active Problem List   Diagnosis    Esophageal reflux    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    NUMBNESS LEFT EXTREMITIES    Hypertensive disorder    CARDIOVASCULAR SCREENING; LDL GOAL LESS THAN 160    Klippel Trenaunay syndrome    Preoperative examination    Mechanical ptosis of bilateral eyelids    Anemia, unspecified type    Edema of lower extremity    Venous malformation    Ulcer of foot (H)    Hip fracture, right, closed, initial encounter (H)    Closed displaced intertrochanteric fracture of left femur, initial encounter (H)       Family History:  Family History   Problem Relation Age of Onset    Cancer - colorectal Father     Diabetes Brother        Social History:  Social History     Tobacco Use    Smoking status: Former     Current packs/day: 0.00     Average packs/day: 0.3 packs/day for 2.0 years (0.5 ttl pk-yrs)     Types: Cigarettes     Start date: 10/1/1990     Quit date: 10/1/1992     Years since quittin.6    Smokeless tobacco: Never    Tobacco comments:     Quit    Vaping Use    Vaping status: Never Used   Substance Use Topics    Alcohol use: Yes     Comment: 2-3 beers daily    Drug use: No       Medications / Allergies:  Reviewed in EMR.    --------------------    Physical Exam:  VS: BP (P) 107/61 (BP Location: Right arm)   Pulse (P) 90   Temp (P) 98.4  F (36.9  C) (Oral)   Resp (P) 16   Ht 1.753 m (5' 9\")   " Wt 93 kg (205 lb)   LMP 08/05/2012   SpO2 (P) 92%   BMI 30.27 kg/m    GEN: Well appearing.    Labs / Imaging / Path:  Reviewed CBC, ferritin, iron panel, B12, folate.

## 2025-05-16 NOTE — PROGRESS NOTES
"[facility]    Medicine Progress Note - Hospitalist Service    Date of Admission:  5/13/2025    Assessment & Plan   Vandana Gonzalez is a 64 year old female with past medical history significant for osteoporosis, breast cancer who presents with fall and left hip pain. Admitted on 5/13/2025.     Acute left intertrochanteric femur fracture, mildly displaced  S/p  Left intramedullary nail fixation on 5/14/25  Mechanical fall  Osteoporosis      *L hip XRay 5/12/25 =    mildly displaced left intertrochanteric hip fracture  *RCRI 0. Denies any chest pain or shortness of breath.   Can do work of 4+ mets at baseline.   EKG 5/12/25 is NSR  - Orthopedic surgery consulted, IM nail fixation 5/14.       -  post op orders  (pain, activity, wound care, VTE prophyllaxis.) per ortho surgeon   - ortho surgery recommends waiting to start lovenox VTE prophyllaxis once Hgb> 8.5    - Scheduled acetaminophen   - Oxycodone PO PRN, hydromorphone IV PRN        Post op anemia  Acute on chronic anemia  H/o esophageal ulcers  H/o Low B12   -Hgb 9.3 --> 8.5 --> 7.3 -->  1 U PRBC  5/15/25  --> 8.0 --> 7.5  -500 cc blood loss intra-op  - patient has chronic anemia prior to admission   - B12 962 and folate level 33 this admission  - patient reports she used to take oral iron, \"I forgot once I ran out last year\"     - bid Hgb checks     - chronic anemia due to CONRADO (esophageal ulcer history), low B12 (=185 in 2022)  - surveillance EGDs (several, as recent as 2025) and 2022 colonoscopy show no malignancy (reassuring)      - on 5/16/25 gave patient 300mg IV venofer -- felt patient response to the 5/15 transfusion was suboptimal.  Does not seem to have any signs of current bleeding.  Therefore, asked hematology to evaluate, see Dr Nunn's note 5/16/25: Helpful, reassuring and very appreciated.   Continue IV venofer as recommended for total of 5 doses.    --Hopefully the Hgb will get to 8.5 allowing VTE prophyllaxis to start " "(lovenox 40) prior to discharge from hospital .  She is higher risk for post op VTE in with her vascular malformations in the affected left leg....    - starting every other day oral iron as well    - continue pta sucralfate and protonix (may inhibit oral iron absorbtion)  - watch for signs/symptoms of ongoing bleeding, none apparent at this time...     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   Chronic lymphedema, L>R  Chronic left lower extremity wound, POA  Chronic port wine discoloration left leg with lymphedema and chronic ulceration   - Wound RN consulted     Breast cancer  -Right breast Invasive Ductal Carcinoma ER positive, GA negative, HER2 negative   Stage IA (pT1b, pN0(sn), cM0, G2, Oncotype DX score: 26)   - R lumpectomy 11/2024,  status post radiation 1/2025  -Follows with  oncology. Treated with lumpectomy, radiation, tamoxifen.   - Continue prior to admission tamoxifen      Hypertension  - Hold prior to admission telmisartan (losartan) due to soft BPs  (soft BPs likely due to post op anemia)     GERD  - Continue prior to admission PPI  ,  sucralfate    Alcohol overuse  Reports drinking 2-3 beers daily. Denies any history of withdrawal.  - Discussed recommended alcohol limits  - Monitor clinically for any signs of withdrawal           Diet: Advance Diet as Tolerated: Regular Diet Adult    DVT Prophylaxis: Pneumatic Compression Devices  Lemons Catheter: Not present  Lines: None     Cardiac Monitoring: None  Code Status: Full Code      Clinically Significant Risk Factors                   # Hypertension: Noted on problem list            # Obesity: Estimated body mass index is 30.27 kg/m  as calculated from the following:    Height as of this encounter: 1.753 m (5' 9\").    Weight as of this encounter: 93 kg (205 lb)., PRESENT ON ADMISSION     # " Financial/Environmental Concerns: none         Social Drivers of Health    Physical Activity: Patient Declined (10/4/2024)    Exercise Vital Sign     Days of Exercise per Week: Patient declined     Minutes of Exercise per Session: Patient declined   Stress: Stress Concern Present (10/4/2024)    [place] [employer] - Occupational Stress Questionnaire     Feeling of Stress : To some extent   Social Connections: Unknown (10/4/2024)    Social Connection and Isolation Panel [NHANES]     Frequency of Social Gatherings with Friends and Family: Patient declined          Disposition Plan     Medically Ready for Discharge: Anticipated Tomorrow if TCU found and felt to be safe d/c             Maddi Gomez MD  Hospitalist Service  [facility]  Securely message with Cr[employer] (more info)  Text page via [employer] Paging/Directory   ______________________________________________________________________    Interval History   Hgb response to Transfusion not robust.   Patient fatigued, pain is controlled in general.      Physical Exam   Vital Signs: Temp: 98.7  F (37.1  C) Temp src: Oral BP: 100/58 Pulse: 92   Resp: 16 SpO2: 92 % O2 Device: None (Room air)    Weight: 205 lbs 0 oz  Constitutional: Well-appearing, NAD   mild-mod pain at rest   pale  Respiratory: Clear to auscultation bilaterally, good air movement, normal effort   Cardiovascular: RRR, no m/r/g. Bilateral lower extremity edema L >> R  GI: Soft, non-tender, non-distended. No rebound tenderness or guarding.    Skin: Warm, dry. Port wine discoloration throughout left lower extremity. Wound left lateral shin.   Neurologic: Alert. Responding to questions appropriately.  Normal speech  MS:   DC has chronic edema and multiple surgical wounds from previous vein surgeries and a major surgery for a L femur fracture in past.    L hip surgery sites are dressed, intact.  No obvious bleeding nor hematoma.   Psychiatric: Normal affect,  appropriate          Medical Decision Making       55 MINUTES SPENT BY ME on the date of service doing chart review, history, exam, documentation & further activities per the note.      Data     I have personally reviewed the following data over the past 24 hrs:    N/A  \   7.5 (L)   / N/A     N/A N/A N/A /  126 (H)   3.8 N/A N/A \       Imaging results reviewed over the past 24 hrs:   No results found for this or any previous visit (from the past 24 hours).

## 2025-05-16 NOTE — PROGRESS NOTES
Orthopedic Surgery  Vandana Gonzalez  05/16/2025     Admit Date:  5/13/2025  Assessment:   POD: 2 Days Post-Op   Procedure(s):  OPEN REDUCTION INTERNAL FIXATION, FRACTURE, FEMUR, USING INTRAMEDULLARY SAMI AND FRACTURE TABLE, LEFT     Patient resting comfortably in bed    Pain controlled  Tolerating oral intake  Denies nausea or vomiting  Denies chest pain or shortness of breath    Temp:  [97.6  F (36.4  C)-99.1  F (37.3  C)] 98  F (36.7  C)  Pulse:  [74-90] 84  Resp:  [16] 16  BP: ()/(53-62) 100/60  SpO2:  [93 %-96 %] 94 %    Alert and oriented  Dressing is clean, dry, and intact   Minimal erythema of the surrounding skin   Bilateral calves are soft, non-tender  Left lower extremity is NVI  Sensation intact bilateral lower extremities  Patient able to resist dorsi and plantar flexion bilaterally  +Dp pulse    Labs:  Recent Labs   Lab Test 05/16/25  0546 05/15/25  1945 05/15/25  1106 05/14/25  2115 05/14/25  0751 05/13/25  0300   WBC  --   --  7.8  --  5.4 8.5   HGB 7.5* 8.0* 7.3*   < > 9.3* 9.3*   PLT  --   --  168  --  202 239    < > = values in this interval not displayed.     Recent Labs   Lab Test 05/13/25  0300 10/07/21  0554   INR 1.08 1.12           Plan:   Continue Lovenox for DVT prophylaxis when Hgb is >8.5    Lovenox for 6 weeks    Mobilize with PT/OT, fall precautions    WBAT LLE with assistive device    Continue current pain regimen   Dressings: Keep intact.  Change if >60% saturated or peeling off    Follow-up: 2 weeks post-op with Miley/ Dr Nolasco's team   Social work coordinating discharge planning    Disposition:    Anticipate d/c to TCU when medically cleared and progressing in PT    Kelsy Wolff PA-C  [facility]

## 2025-05-16 NOTE — CONSULTS
Care Management Initial Consult    General Information  Assessment completed with: Vandana Luna  Type of CM/SW Visit: Initial Assessment    Primary Care Provider verified and updated as needed: Yes   Readmission within the last 30 days: no previous admission in last 30 days      Reason for Consult: discharge planning  Advance Care Planning: Advance Care Planning Reviewed: no concerns identified          Communication Assessment  Patient's communication style: spoken language (English or Bilingual)             Cognitive  Cognitive/Neuro/Behavioral: WDL                      Living Environment:   People in home: spouse  Raúl  Current living Arrangements: house      Able to return to prior arrangements: yes       Family/Social Support:  Care provided by: self  Provides care for: no one  Marital Status:   Support system:   Raúl       Description of Support System: Supportive, Involved    Support Assessment: Adequate family and caregiver support    Current Resources:   Patient receiving home care services: No        Community Resources: None  Equipment currently used at home: cane, straight  Supplies currently used at home: None    Employment/Financial:  Employment Status: retired        Financial Concerns: none   Referral to Financial Worker: No       Does the patient's insurance plan have a 3 day qualifying hospital stay waiver?  Yes     Which insurance plan 3 day waiver is available? Alternative insurance waiver    Will the waiver be used for post-acute placement? No    Lifestyle & Psychosocial Needs:  Social Drivers of Health     Food Insecurity: Low Risk  (10/4/2024)    Food Insecurity     Within the past 12 months, did you worry that your food would run out before you got money to buy more?: No     Within the past 12 months, did the food you bought just not last and you didn t have money to get more?: No   Depression: Not at risk (11/7/2024)    Received from [facility]    PHQ-2     PHQ-2 Score: 0    Housing Stability: Low Risk  (10/4/2024)    Housing Stability     Do you have housing? : Yes     Are you worried about losing your housing?: No   Tobacco Use: Low Risk  (3/19/2025)    Received from [facility]    Patient History     Smoking Tobacco Use: Never     Smokeless Tobacco Use: Never     Passive Exposure: Not on file   Financial Resource Strain: Low Risk  (10/4/2024)    Financial Resource Strain     Within the past 12 months, have you or your family members you live with been unable to get utilities (heat, electricity) when it was really needed?: No   Alcohol Use: Not on file   Transportation Needs: Low Risk  (10/4/2024)    Transportation Needs     Within the past 12 months, has lack of transportation kept you from medical appointments, getting your medicines, non-medical meetings or appointments, work, or from getting things that you need?: No   Physical Activity: Patient Declined (10/4/2024)    Exercise Vital Sign     Days of Exercise per Week: Patient declined     Minutes of Exercise per Session: Patient declined   Interpersonal Safety: Not on file   Stress: Stress Concern Present (10/4/2024)    [place] [employer]ational Health - Occupational Stress Questionnaire     Feeling of Stress : To some extent   Social Connections: Unknown (10/4/2024)    Social Connection and Isolation Panel [NHANES]     Frequency of Communication with Friends and Family: Not on file     Frequency of Social Gatherings with Friends and Family: Patient declined     Attends Worship Services: Not on file     Active Member of Clubs or Organizations: Not on file     Attends Club or Organization Meetings: Not on file     Marital Status: Not on file   Health Literacy: Not on file       Functional Status:  Prior to admission patient needed assistance:   Dependent ADLs:: Ambulation-cane  Dependent IADLs:: Independent       Mental Health Status:          Chemical Dependency Status:                Values/Beliefs:  Spiritual,  Cultural Beliefs, Mormon Practices, Values that affect care: no               Discussed  Partnership in Safe Discharge Planning  document with patient/family: No    Additional Information:      Consulted for discharge planning, writer met with Pt at bedside and introduced self and role in discharge planning. Pt lives in a house with her , Raúl. Per Pt prior to admit, Pt was independent with all ADL's but would use a cane for balance at times and was still driving. Pt denies any financial concerns at this time. Pt wasn't receiving any [facility] or [facility] services. Discussed that PT is recommending TCU, but Pt is really not wanting to go to TCU and would be more open to having HHC at discharge. Writer encourage her to walk with staff and therapy to increase her mobility so she would be able to return home. Pt agreed. Pt's , Raúl, will transport Pt at discharge.              Inpatient Care Coordinator will continue to follow for discharge needs.        Next Steps: Follow for discharge TCU vs HHC. Pt is wanting HHC.      Mag Ulloa, RN, BSN, Care Coordinator

## 2025-05-16 NOTE — PROGRESS NOTES
POD#: 2  Mental Status:***  Activity/dangle: Did not get out of bed. Pt refusing turns and repositioning.   Diet:***  Pain:***  Lemons/Voiding:***  Tele/Restraints/Iso:***  02/LDA:***  D/C Date:***  Other Info:***

## 2025-05-16 NOTE — PROGRESS NOTES
"   05/16/25 1500   Appointment Info   Signing Clinician's Name / Credentials (OT) Lucila Bright, OTD, OTR/L   Rehab Comments (OT) WBAT LLE   Living Environment   People in Home spouse   Current Living Arrangements house   Home Accessibility stairs within home   Number of Stairs, Within Home, Primary greater than 10 stairs  (8 B rail (but too wide to reach) + 6 R rail)   Stair Railings, Within Home, Primary railings safe and in good condition   Transportation Anticipated car, drives self;family or friend will provide   Living Environment Comments Patient lives in a multi-level home with her . There are 8 steps from door to living area and an additional 6 steps up to bedroom. Patient reports there are B rails for 8 steps but only able to reach one at a time. There is a right rail for 6 steps up to bedroom. BR set up: Patient's bathroom has a tub shower with shower chair and comfort height toilet. She sleeps in a standard bed (exits to right).   Self-Care   Usual Activity Tolerance good   Current Activity Tolerance poor   Regular Exercise No   Equipment Currently Used at Home cane, straight   Fall history within last six months yes   Number of times patient has fallen within last six months 1   Activity/Exercise/Self-Care Comment Pt is ind with ADLs at baseline.   Instrumental Activities of Daily Living (IADL)   Previous Responsibilities meal prep;housekeeping;laundry;shopping;medication management;finances;driving   IADL Comments Pt shares cooking/cleaning with spouse, spouse gets groceries.   General Information   Onset of Illness/Injury or Date of Surgery 05/13/25   Referring Physician Miley Kearney PA-C   Patient/Family Therapy Goal Statement (OT) To get stronger/To go home   Additional Occupational Profile Info/Pertinent History of Current Problem Per chart: \"Vandana Gonzalez is a 64 year old female with past medical history significant for osteoporosis, breast cancer who presents with fall and left hip pain. " "Admitted on 5/13/2025.\"   Existing Precautions/Restrictions fall;weight bearing   Left Lower Extremity (Weight-bearing Status) weight-bearing as tolerated (WBAT)   General Observations and Info Significant swelling in LLE   Cognitive Status Examination   Orientation Status orientation to person, place and time   Cognitive Status Comments will contiue to monitor   Visual Perception   Visual Impairment/Limitations WFL;corrective lenses full-time   Sensory   Sensory Quick Adds sensation intact   Pain Assessment   Patient Currently in Pain Yes, see Vital Sign flowsheet   Range of Motion Comprehensive   Comment, General Range of Motion BUEs WFL   Strength Comprehensive (MMT)   Comment, General Manual Muscle Testing (MMT) Assessment BUEs WFL   Coordination   Upper Extremity Coordination No deficits were identified   Bed Mobility   Comment (Bed Mobility) NT - pt up in chair at time of OT session   Transfers   Transfers sit-stand transfer   Sit-Stand Transfer   Sit/Stand Transfer Comments Dep A - unable to progress d/t pain   Balance   Balance Comments Overall good supported sitting balance   Activities of Daily Living   BADL Assessment/Intervention lower body dressing   Lower Body Dressing Assessment/Training   Comment, (Lower Body Dressing) Mod A B shoes   Clinical Impression   Criteria for Skilled Therapeutic Interventions Met (OT) Yes, treatment indicated   OT Diagnosis Decreased independence with I/ADLs   OT Problem List-Impairments impacting ADL problems related to;activity tolerance impaired;balance;mobility;strength;pain   Assessment of Occupational Performance 3-5 Performance Deficits   Identified Performance Deficits dressing, toileting, bathing, grooming, IADLs   Planned Therapy Interventions (OT) ADL retraining;IADL retraining;transfer training   Clinical Decision Making Complexity (OT) problem focused assessment/low complexity   Risk & Benefits of therapy have been explained evaluation/treatment results " reviewed;care plan/treatment goals reviewed;current/potential barriers reviewed;risks/benefits reviewed;participants included;patient   OT Total Evaluation Time   OT Eval, Low Complexity Minutes (48314) 10   OT Goals   Therapy Frequency (OT) 5 times/week   OT Predicted Duration/Target Date for Goal Attainment 05/30/25   OT Goals Hygiene/Grooming;Upper Body Dressing;Lower Body Dressing;Transfers;Toilet Transfer/Toileting;OT Goal 1   OT: Hygiene/Grooming supervision/stand-by assist;while standing  (FWW)   OT: Upper Body Dressing Supervision/stand-by assist   OT: Lower Body Dressing Supervision/stand-by assist  (FWW, AE as needed)   OT: Transfer Supervision/stand-by assist;with assistive device  (FWW, tub)   OT: Toilet Transfer/Toileting Supervision/stand-by assist;toilet transfer;cleaning and garment management  (FWW, [place] [facility]   OT: Goal 1 Pt will ind verbalize/demo BUE HEP for incr strength to complete I/ADLs.   Therapeutic Procedures/Exercise   Therapeutic Procedure: strength, endurance, ROM, flexibillity minutes (30723) 10   Symptoms Noted During/After Treatment increased pain   Treatment Detail/Skilled Intervention Pt completes BUE HEP with theraband for increased strength to progress I/ADL independence. Pt completes 1 set x 5-6 reps of downward pulls, diagonal pulls, overhead pull-downs, outward pulls, forward punches, elbow bends. VC and visual cues throughout for technique.   Therapeutic Activities   Therapeutic Activity Minutes (96381) 8   Symptoms noted during/after treatment increased pain   Treatment Detail/Skilled Intervention Pt greeted in chair, agreeable to OT with encouragement. Pt reports high pain levels, agreeable to trial sit<>stand with SS. SS placed in front of pt, VCs for technique. Pt attempt sit> stand from chair using SS, VC for technique, pt able to clear trace amount of her bottom from the bed. Deferred further trial d/t pain - RN aware provided pain medications. Pt repo in chair with  BLEs elevated and needs met, items in reach, spouse entering at end of session, pt provided with ice pack for pain, alarm set.   OT Discharge Planning   OT Plan UB dressing, BUE HEP, pending pain - LB dressing (bring AE), progress to standing as able with SS   OT Discharge Recommendation (DC Rec) Transitional Care Facility;other (see comments)  (See rationale)   OT Rationale for DC Rec Pt functioning below baseline with noted impairments in activity tolerance, balance, strength, mobility, pain,  impacting safety/independence with I/ADLs. Pt resides with spouse, ind with most IADLs and all ADLs. Currently, pt completes mobility and self cares with Ax2. Recommend skilled TCU for increased I/ADL independence. Pt really wants to return home. OT will continue to follow.   OT Brief overview of current status Goals of therapy will be to address safe mobility and make recs for d/c to next level of care. Pt and RN will continue to follow all falls risk precautions as documented by RN staff while hospitalized.  (Ax2 lift vs SS)   OT Total Distance Amb During Session (feet) 0   Total Session Time   Timed Code Treatment Minutes 18   Total Session Time (sum of timed and untimed services) 28

## 2025-05-16 NOTE — PLAN OF CARE
Goal Outcome Evaluation:      Plan of Care Reviewed With: patient          Outcome Evaluation: TCU vs Home with C        Mag Ulloa, RN, BSN, Care Coordinator

## 2025-05-17 ENCOUNTER — APPOINTMENT (OUTPATIENT)
Dept: GENERAL RADIOLOGY | Facility: CLINIC | Age: 65
DRG: 481 | End: 2025-05-17
Attending: STUDENT IN AN ORGANIZED HEALTH CARE EDUCATION/TRAINING PROGRAM
Payer: COMMERCIAL

## 2025-05-17 LAB
ERYTHROCYTE [DISTWIDTH] IN BLOOD BY AUTOMATED COUNT: 18.3 % (ref 10–15)
HCT VFR BLD AUTO: 24.2 % (ref 35–47)
HGB BLD-MCNC: 7.5 G/DL (ref 11.7–15.7)
HGB BLD-MCNC: 7.5 G/DL (ref 11.7–15.7)
MCH RBC QN AUTO: 27.7 PG (ref 26.5–33)
MCHC RBC AUTO-ENTMCNC: 31 G/DL (ref 31.5–36.5)
MCV RBC AUTO: 89 FL (ref 78–100)
MCV RBC AUTO: 89 FL (ref 78–100)
PLATELET # BLD AUTO: 169 10E3/UL (ref 150–450)
RBC # BLD AUTO: 2.71 10E6/UL (ref 3.8–5.2)
WBC # BLD AUTO: 5.2 10E3/UL (ref 4–11)

## 2025-05-17 PROCEDURE — 99233 SBSQ HOSP IP/OBS HIGH 50: CPT | Performed by: STUDENT IN AN ORGANIZED HEALTH CARE EDUCATION/TRAINING PROGRAM

## 2025-05-17 PROCEDURE — 250N000013 HC RX MED GY IP 250 OP 250 PS 637

## 2025-05-17 PROCEDURE — 73552 X-RAY EXAM OF FEMUR 2/>: CPT | Mod: LT

## 2025-05-17 PROCEDURE — 85018 HEMOGLOBIN: CPT | Performed by: INTERNAL MEDICINE

## 2025-05-17 PROCEDURE — 85014 HEMATOCRIT: CPT | Performed by: STUDENT IN AN ORGANIZED HEALTH CARE EDUCATION/TRAINING PROGRAM

## 2025-05-17 PROCEDURE — 36415 COLL VENOUS BLD VENIPUNCTURE: CPT | Performed by: STUDENT IN AN ORGANIZED HEALTH CARE EDUCATION/TRAINING PROGRAM

## 2025-05-17 PROCEDURE — 120N000001 HC R&B MED SURG/OB

## 2025-05-17 PROCEDURE — 250N000011 HC RX IP 250 OP 636: Mod: JZ | Performed by: INTERNAL MEDICINE

## 2025-05-17 PROCEDURE — 250N000013 HC RX MED GY IP 250 OP 250 PS 637: Performed by: INTERNAL MEDICINE

## 2025-05-17 PROCEDURE — 99232 SBSQ HOSP IP/OBS MODERATE 35: CPT | Performed by: INTERNAL MEDICINE

## 2025-05-17 PROCEDURE — 250N000011 HC RX IP 250 OP 636: Performed by: STUDENT IN AN ORGANIZED HEALTH CARE EDUCATION/TRAINING PROGRAM

## 2025-05-17 PROCEDURE — 258N000003 HC RX IP 258 OP 636: Performed by: INTERNAL MEDICINE

## 2025-05-17 RX ADMIN — TAMOXIFEN CITRATE 20 MG: 10 TABLET, FILM COATED ORAL at 08:47

## 2025-05-17 RX ADMIN — POLYETHYLENE GLYCOL 3350 17 G: 17 POWDER, FOR SOLUTION ORAL at 08:47

## 2025-05-17 RX ADMIN — ACETAMINOPHEN 650 MG: 325 TABLET, FILM COATED ORAL at 20:07

## 2025-05-17 RX ADMIN — PANTOPRAZOLE SODIUM 40 MG: 40 TABLET, DELAYED RELEASE ORAL at 17:27

## 2025-05-17 RX ADMIN — ACETAMINOPHEN 650 MG: 325 TABLET, FILM COATED ORAL at 11:23

## 2025-05-17 RX ADMIN — OXYCODONE HYDROCHLORIDE 10 MG: 5 TABLET ORAL at 08:47

## 2025-05-17 RX ADMIN — SUCRALFATE 1 G: 1 TABLET ORAL at 06:34

## 2025-05-17 RX ADMIN — OXYCODONE HYDROCHLORIDE 10 MG: 5 TABLET ORAL at 00:34

## 2025-05-17 RX ADMIN — OXYCODONE HYDROCHLORIDE 10 MG: 5 TABLET ORAL at 13:36

## 2025-05-17 RX ADMIN — SUCRALFATE 1 G: 1 TABLET ORAL at 17:27

## 2025-05-17 RX ADMIN — SENNOSIDES AND DOCUSATE SODIUM 1 TABLET: 50; 8.6 TABLET ORAL at 20:07

## 2025-05-17 RX ADMIN — PANTOPRAZOLE SODIUM 40 MG: 40 TABLET, DELAYED RELEASE ORAL at 06:34

## 2025-05-17 RX ADMIN — SUCRALFATE 1 G: 1 TABLET ORAL at 11:23

## 2025-05-17 RX ADMIN — SUCRALFATE 1 G: 1 TABLET ORAL at 22:02

## 2025-05-17 RX ADMIN — ACETAMINOPHEN 650 MG: 325 TABLET, FILM COATED ORAL at 04:32

## 2025-05-17 RX ADMIN — SENNOSIDES AND DOCUSATE SODIUM 1 TABLET: 50; 8.6 TABLET ORAL at 08:47

## 2025-05-17 RX ADMIN — FERROUS SULFATE TAB 325 MG (65 MG ELEMENTAL FE) 325 MG: 325 (65 FE) TAB at 08:47

## 2025-05-17 RX ADMIN — IRON SUCROSE 300 MG: 20 INJECTION, SOLUTION INTRAVENOUS at 11:23

## 2025-05-17 RX ADMIN — ENOXAPARIN SODIUM 40 MG: 40 INJECTION SUBCUTANEOUS at 20:08

## 2025-05-17 ASSESSMENT — ACTIVITIES OF DAILY LIVING (ADL)
ADLS_ACUITY_SCORE: 66
ADLS_ACUITY_SCORE: 56
ADLS_ACUITY_SCORE: 52
ADLS_ACUITY_SCORE: 51
ADLS_ACUITY_SCORE: 66
ADLS_ACUITY_SCORE: 56
ADLS_ACUITY_SCORE: 52
ADLS_ACUITY_SCORE: 66
ADLS_ACUITY_SCORE: 52
ADLS_ACUITY_SCORE: 66
ADLS_ACUITY_SCORE: 52
ADLS_ACUITY_SCORE: 51
ADLS_ACUITY_SCORE: 66
ADLS_ACUITY_SCORE: 51
ADLS_ACUITY_SCORE: 66
ADLS_ACUITY_SCORE: 51
ADLS_ACUITY_SCORE: 51
ADLS_ACUITY_SCORE: 56

## 2025-05-17 NOTE — PROGRESS NOTES
Admitting Diagnosis: Closed displaced intertrochanteric fracture of left femur, initial encounter (H) [S70.804A]   Vitals VSS  Pain 9/10. Taking oxy PRN. Last dose 1516  A&Ox4  Voiding Yes  Mobility Assit of 1 GB/W  Lung Sounds Clear on RA via n/a  GI Active  Dressing CDI    Orders Placed This Encounter      Advance Diet as Tolerated: Regular Diet Adult

## 2025-05-17 NOTE — PROGRESS NOTES
"[facility] / Oncology  Progress Note  Name: Vandana Gonzalez  :  1960  MRN:  [id number]    --------------------    Subjective:  Feeling about the same.  Pain controlled.  No signs external bleeding.  Tolerating Venofer w/o issue.    --------------------    Objective:  VS: /56 (BP Location: Right arm)   Pulse 88   Temp 98.7  F (37.1  C) (Oral)   Resp 16   Ht 1.753 m (5' 9\")   Wt 93 kg (205 lb)   LMP 2012   SpO2 95%   BMI 30.27 kg/m    Gen: Well-appearing.    We reviewed CBC.    --------------------    Assessment / Plan:  Iron Deficiency Anemia, likely poor PO absorption, microscopic losses and post-operative.  Long-standing anemia prior to hospitalization.  Esophageal stenosis requiring dilatation.  Long-term PPI and Carafate use.  Reassuring past EGDs () and colonoscopy ()  Early stage, hormone positive breast cancer.  Status post lumpectomy.  Status post adjuvant radiotherapy.  Ongoing endocrine therapy.  Left intertrochanteric femur fracture, mildly displaced.  Status post left intramedullary nail fixation.  Klippel Trenaunay syndrome.    Continue daily Venofer while hospitalized; the goal is a total of 5 doses.  Full support for Vandana to receive remaining iron infusions w/ Dr. Galvan at [facility].  Fine to continue PPI and Carafate, but need to monitor iron periodically.  Ultimately, hemoglobin response to parenteral iron will determine next steps.  If beautiful response, great; if inadequate response, additional evaluation may be required.  If recurrent CONRADO issues, consider capsule endoscopy as KT syndrome can be associated w/ vascular anomalies.  Will sign off at this time.    Marcos Nunn MD.    "

## 2025-05-17 NOTE — PROGRESS NOTES
Admitting Diagnosis: Closed displaced intertrochanteric fracture of left femur, initial encounter (H) [S7.927V]   Vitals VSS  Pain 8/10. Taking ox PRN. Last dose 1336  A&Ox4  Voiding Yes  Mobility Assist of 2 with nai connors  CMS: L hip replacement, pod#4 weakness/painful  Lung Sounds Clear on RA via n/a  GI Active, passing gas  Dressing CDI    Orders Placed This Encounter      Advance Diet as Tolerated: Regular Diet Adult

## 2025-05-17 NOTE — PROGRESS NOTES
Orthopedic Surgery  Vandana Gonzalez  05/17/2025     Admit Date:  5/13/2025  Assessment:   POD: 3 Days Post-Op   Procedure(s):  OPEN REDUCTION INTERNAL FIXATION, FRACTURE, FEMUR, USING INTRAMEDULLARY FRANCISCO AND FRACTURE TABLE, LEFT     Patient resting comfortably in bed. She endorses pain post operatively, and unable to lift leg. 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   Tolerating oral intake  Denies nausea or vomiting  Denies chest pain or shortness of breath  VSS, afebrile    Temp:  [98  F (36.7  C)-99  F (37.2  C)] 98  F (36.7  C)  Pulse:  [80-99] 80  Resp:  [16] 16  BP: (109-125)/(56-63) 125/63  SpO2:  [92 %-99 %] 92 %    Alert and oriented  Dressing is clean, dry, and intact   Minimal erythema of the surrounding skin aside from birthmark. No changes from baseline   Bilateral calves are soft, non-tender  Lymphatic congestion (also baseline)   Left lower extremity is NVI  Sensation intact bilateral lower extremities  Patient able to resist dorsi and plantar flexion bilaterally  +Dp pulse    Labs:  Recent Labs   Lab Test 05/17/25  0755 05/16/25  1843 05/16/25  0546 05/15/25  1945 05/15/25  1106 05/14/25  2115 05/14/25  0751   WBC 5.2  --   --   --  7.8  --  5.4   HGB 7.5*  7.5* 8.3* 7.5*   < > 7.3*   < > 9.3*     --   --   --  168  --  202    < > = values in this interval not displayed.     Recent Labs   Lab Test 05/13/25  0300 10/07/21  0554   INR 1.08 1.12     LEFT FEMUR XR IMPRESSION: Interval IM francisco and screw fixation of the left intertrochanteric femur fracture with cement in the femoral head surrounding the distal aspect of the femoral neck screw. No immediate postoperative complication. Instrumentation across a healed   fracture deformity of the distal femur, unchanged. No new fracture. Osseous demineralization. Scattered postsurgical gas in the soft tissues surrounding the left hip.      Plan:   Continue Lovenox  for DVT prophylaxis    Lovenox for 6 weeks    Mobilize with PT/OT, fall precautions    WBAT LLE with assistive device    Continue current pain regimen   Dressings: Keep intact.  Change if >60% saturated or peeling off    Follow-up: 2 weeks post-op with Miley/ Dr Nolasco's team   Social work coordinating discharge planning    Disposition:    Anticipate d/c to TCU when medically cleared and progressing in PT    Rubina Davis PA-C  [facility]

## 2025-05-17 NOTE — PLAN OF CARE
Goal Outcome Evaluation:  Shift Summary [id number]    Admitting Diagnosis: Closed displaced intertrochanteric fracture of left femur, initial encounter (H) [S72.142A]   Vitals Vital signs:  Temp: 99  F (37.2  C) Temp src: Oral BP: 124/63 Pulse: 99   Resp: 16 SpO2: 99 % O2 Device: None (Room air)    Pain 7/10. Taking Oxycodone PRN. Last dose 1956  A&Ox:4  Voiding :continent  Mobility A1 Christina steady  Tele :N/A  CMS:Intact  Lung Sounds Clear on 0 LPM via RA  GI BS +4  Dressing :CDI    Orders Placed This Encounter      Advance Diet as Tolerated: Regular Diet Adult       Plan:     Patient vital signs are at baseline: Yes  Patient able to ambulate as they were prior to admission or with assist devices provided by therapies during their stay:  No,  Reason:  Continues with therapy  Patient MUST void prior to discharge:  Yes  Patient able to tolerate oral intake:  Yes  Pain has adequate pain control using Oral analgesics:  Yes  Does patient have an identified :  Yes  Has goal D/C date and time been discussed with patient:  Yes

## 2025-05-17 NOTE — PROGRESS NOTES
"[facility]    Medicine Progress Note - Hospitalist Service    Date of Admission:  5/13/2025    Assessment & Plan   Vandana Gonzalez is a 64 year old female with past medical history significant for osteoporosis, breast cancer who presents with fall and left hip pain. Admitted on 5/13/2025.      Acute left intertrochanteric femur fracture, mildly displaced  S/p  Left intramedullary nail fixation on 5/14/25  Mechanical fall  Osteoporosis      *L hip XRay 5/12/25 =    mildly displaced left intertrochanteric hip fracture  *RCRI 0. Denies any chest pain or shortness of breath.   Can do work of 4+ mets at baseline.   EKG 5/12/25 is NSR  - Orthopedic surgery consulted, IM nail fixation 5/14.       -  post op orders  (pain, activity, wound care, VTE prophyllaxis.) per ortho surgeon   - ortho surgery recommends waiting to start lovenox VTE prophyllaxis once Hgb> 8.5     - Scheduled acetaminophen   - Oxycodone PO PRN, hydromorphone IV PRN       Post op anemia  Acute on chronic anemia  H/o esophageal ulcers  H/o Low B12   -1uPRBC 5/15  -Estimated 500 cc blood loss intra-op  - patient has chronic anemia prior to admission   - B12 962 and folate level 33 this admission  - patient reports she used to take oral iron, \"I forgot once I ran out last year\"   - chronic anemia due to CONRADO (esophageal ulcer history), low B12 (=185 in 2022)  - surveillance EGDs (several, as recent as 2025) and 2022 colonoscopy show no malignancy (reassuring)    - hematology consulted, see 5/16 note  - plan IV venofer  - resume enoxaparin (no evidence of bleeding, high risk for VTE)  - oral iron  - continue PPI and sucralfate with understanding that this may inhibit iron absorption  - monitor for blood loss      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 Treated with lumpectomy, radiation, tamoxifen.   - Continue prior to admission tamoxifen       Hypertension  - Hold prior to admission telmisartan (losartan) due to soft BPs  (soft BPs likely due to post op anemia)      GERD  - Continue prior to admission PPI  ,  sucralfate     Alcohol overuse  Reports drinking 2-3 beers daily. Denies any history of withdrawal.  - Discussed recommended alcohol limits  - Monitor clinically for any signs of withdrawal           Diet: Advance Diet as Tolerated: Regular Diet Adult    DVT Prophylaxis: Enoxaparin (Lovenox) SQ  Lemons Catheter: Not present  Lines: None     Cardiac Monitoring: None  Code Status: Full Code      Clinically Significant Risk Factors                   # Hypertension: Noted on problem list            # Obesity: Estimated body mass index is 30.27 kg/m  as calculated from the following:    Height as of this encounter: 1.753 m (5' 9\").    Weight as of this encounter: 93 kg (205 lb).      # Financial/Environmental Concerns: none         Social Drivers of Health    Physical Activity: Patient Declined (10/4/2024)    Exercise Vital Sign     Days of Exercise per Week: Patient declined     Minutes of Exercise per Session: Patient declined   Stress: Stress Concern Present (10/4/2024)    [place] [employer] - Occupational Stress Questionnaire     Feeling of Stress : To some extent   Social Connections: Unknown (10/4/2024)    Social Connection and Isolation Panel [NHANES]     Frequency of Social Gatherings with Friends and Family: Patient declined          Disposition Plan "     Medically Ready for Discharge: Anticipated Tomorrow             Ced Vora MD  Hospitalist Service  [facility]  Securely message with [employer] (more info)  Text page via [employer] Paging/Directory   ______________________________________________________________________    Interval History   Seen in AM. Left hip pain ongoing but stable. No worsening swelling or pain per patient. Pain with weight bearing. No other source of blood loss. AM hgb typically has been lower.  Resuming enoxaparin today given VTE risk  Discussed with RN and patien.     Physical Exam   Vital Signs: Temp: 98.7  F (37.1  C) Temp src: Oral BP: 109/56 Pulse: 88   Resp: 16 SpO2: 95 % O2 Device: None (Room air)    Weight: 205 lbs 0 oz    Constitutional: Awake, alert, cooperative, no apparent distress  Respiratory: Clear to auscultation bilaterally, no crackles or wheezing  Cardiovascular: Regular rate and rhythm, normal S1 and S2, and no murmur noted  GI: Normal bowel sounds, soft, non-distended, non-tender  Skin/Integumen: No rashes, no cyanosis, no edema. L leg with dressing intact, no clear areas of firmness, fluctuance, chronic lymphedema and skin discoloration of leg noted.   Other:       Medical Decision Making       53 MINUTES SPENT BY ME on the date of service doing chart review, history, exam, documentation & further activities per the note.      Data   ------------------------- PAST 24 HR DATA REVIEWED -----------------------------------------------    I have personally reviewed the following data over the past 24 hrs:    5.2  \   7.5 (L); 7.5 (L)   / 169     N/A N/A N/A /  N/A   N/A N/A N/A \     Ferritin:  N/A % Retic:  N/A LDH:  N/A       Imaging results reviewed over the past 24 hrs:   No results found for this or any previous visit (from the past 24 hours).

## 2025-05-17 NOTE — PROGRESS NOTES
Care Management Follow Up    Length of Stay (days): 4    Expected Discharge Date: 05/18/2025     Concerns to be Addressed: discharge planning  PT is recommending TCU and Pt is refusing and would like to do HHC instead  Patient plan of care discussed at interdisciplinary rounds: Yes    Anticipated Discharge Disposition: Transitional Care     Anticipated Discharge Services: None  Anticipated Discharge DME:      Patient/family educated on Medicare website which has current facility and service quality ratings:    Education Provided on the Discharge Plan:    Patient/Family in Agreement with the Plan: yes    Referrals Placed by CM/SW:    Private pay costs discussed: private room/amenity fees    Discussed  Partnership in Safe Discharge Planning  document with patient/family: No     Handoff Completed: No, handoff not indicated or clinically appropriate    Additional Information:   met with patient to discuss TCU placement and referral options. Provided patient with a SNF list of local facilities. Patient states she would like to be close to the [place]. Patient requested referrals be sent to [facility].  sent referrals via Epic.     Next Steps:   - Follow up with TCU referrals    TABITHA Caba, [facility]  Inpatient Care Coordination  Social Work  [phone]  [facility]

## 2025-05-17 NOTE — PLAN OF CARE
Goal Outcome Evaluation:    Pt A&Ox4. VSS on RA. Denies chest pain and SOB. Ax 2 GB/W or nai steady. Purewick overnight. Pain managed with PRN oxy. All pt needs met at this time.

## 2025-05-18 LAB
ANION GAP SERPL CALCULATED.3IONS-SCNC: 10 MMOL/L (ref 7–15)
BUN SERPL-MCNC: 6.8 MG/DL (ref 8–23)
CALCIUM SERPL-MCNC: 8.5 MG/DL (ref 8.8–10.4)
CHLORIDE SERPL-SCNC: 107 MMOL/L (ref 98–107)
CREAT SERPL-MCNC: 0.5 MG/DL (ref 0.51–0.95)
EGFRCR SERPLBLD CKD-EPI 2021: >90 ML/MIN/1.73M2
ERYTHROCYTE [DISTWIDTH] IN BLOOD BY AUTOMATED COUNT: 18.6 % (ref 10–15)
GLUCOSE SERPL-MCNC: 94 MG/DL (ref 70–99)
HCO3 SERPL-SCNC: 22 MMOL/L (ref 22–29)
HCT VFR BLD AUTO: 24 % (ref 35–47)
HGB BLD-MCNC: 7.5 G/DL (ref 11.7–15.7)
MCH RBC QN AUTO: 27.9 PG (ref 26.5–33)
MCHC RBC AUTO-ENTMCNC: 31.3 G/DL (ref 31.5–36.5)
MCV RBC AUTO: 89 FL (ref 78–100)
PLATELET # BLD AUTO: 201 10E3/UL (ref 150–450)
POTASSIUM SERPL-SCNC: 3.6 MMOL/L (ref 3.4–5.3)
RBC # BLD AUTO: 2.69 10E6/UL (ref 3.8–5.2)
SODIUM SERPL-SCNC: 139 MMOL/L (ref 135–145)
WBC # BLD AUTO: 5.2 10E3/UL (ref 4–11)

## 2025-05-18 PROCEDURE — 250N000011 HC RX IP 250 OP 636: Performed by: STUDENT IN AN ORGANIZED HEALTH CARE EDUCATION/TRAINING PROGRAM

## 2025-05-18 PROCEDURE — 250N000011 HC RX IP 250 OP 636: Mod: JZ | Performed by: INTERNAL MEDICINE

## 2025-05-18 PROCEDURE — 99232 SBSQ HOSP IP/OBS MODERATE 35: CPT | Performed by: STUDENT IN AN ORGANIZED HEALTH CARE EDUCATION/TRAINING PROGRAM

## 2025-05-18 PROCEDURE — 258N000003 HC RX IP 258 OP 636: Performed by: INTERNAL MEDICINE

## 2025-05-18 PROCEDURE — 250N000013 HC RX MED GY IP 250 OP 250 PS 637

## 2025-05-18 PROCEDURE — 85027 COMPLETE CBC AUTOMATED: CPT | Performed by: STUDENT IN AN ORGANIZED HEALTH CARE EDUCATION/TRAINING PROGRAM

## 2025-05-18 PROCEDURE — 120N000001 HC R&B MED SURG/OB

## 2025-05-18 PROCEDURE — 80048 BASIC METABOLIC PNL TOTAL CA: CPT | Performed by: STUDENT IN AN ORGANIZED HEALTH CARE EDUCATION/TRAINING PROGRAM

## 2025-05-18 PROCEDURE — 36415 COLL VENOUS BLD VENIPUNCTURE: CPT | Performed by: STUDENT IN AN ORGANIZED HEALTH CARE EDUCATION/TRAINING PROGRAM

## 2025-05-18 RX ORDER — ACETAMINOPHEN 325 MG/1
650 TABLET ORAL EVERY 8 HOURS
Qty: 100 TABLET | Refills: 0 | DISCHARGE
Start: 2025-05-18

## 2025-05-18 RX ORDER — ENOXAPARIN SODIUM 100 MG/ML
40 INJECTION SUBCUTANEOUS EVERY 24 HOURS
DISCHARGE
Start: 2025-05-18 | End: 2025-06-29

## 2025-05-18 RX ORDER — AMOXICILLIN 250 MG
1 CAPSULE ORAL 2 TIMES DAILY PRN
Qty: 30 TABLET | Refills: 0 | DISCHARGE
Start: 2025-05-18 | End: 2025-05-20

## 2025-05-18 RX ORDER — OXYCODONE HYDROCHLORIDE 5 MG/1
5 TABLET ORAL EVERY 4 HOURS PRN
Qty: 20 TABLET | Refills: 0 | Status: SHIPPED | OUTPATIENT
Start: 2025-05-18 | End: 2025-05-22

## 2025-05-18 RX ADMIN — SUCRALFATE 1 G: 1 TABLET ORAL at 17:18

## 2025-05-18 RX ADMIN — ENOXAPARIN SODIUM 40 MG: 40 INJECTION SUBCUTANEOUS at 20:56

## 2025-05-18 RX ADMIN — ACETAMINOPHEN 650 MG: 325 TABLET, FILM COATED ORAL at 12:41

## 2025-05-18 RX ADMIN — ACETAMINOPHEN 650 MG: 325 TABLET, FILM COATED ORAL at 20:57

## 2025-05-18 RX ADMIN — SENNOSIDES AND DOCUSATE SODIUM 1 TABLET: 50; 8.6 TABLET ORAL at 20:57

## 2025-05-18 RX ADMIN — PANTOPRAZOLE SODIUM 40 MG: 40 TABLET, DELAYED RELEASE ORAL at 07:00

## 2025-05-18 RX ADMIN — TAMOXIFEN CITRATE 20 MG: 10 TABLET, FILM COATED ORAL at 09:08

## 2025-05-18 RX ADMIN — SUCRALFATE 1 G: 1 TABLET ORAL at 07:00

## 2025-05-18 RX ADMIN — OXYCODONE HYDROCHLORIDE 5 MG: 5 TABLET ORAL at 21:49

## 2025-05-18 RX ADMIN — OXYCODONE HYDROCHLORIDE 10 MG: 5 TABLET ORAL at 17:18

## 2025-05-18 RX ADMIN — SUCRALFATE 1 G: 1 TABLET ORAL at 12:41

## 2025-05-18 RX ADMIN — SENNOSIDES AND DOCUSATE SODIUM 1 TABLET: 50; 8.6 TABLET ORAL at 09:08

## 2025-05-18 RX ADMIN — OXYCODONE HYDROCHLORIDE 10 MG: 5 TABLET ORAL at 02:58

## 2025-05-18 RX ADMIN — POLYETHYLENE GLYCOL 3350 17 G: 17 POWDER, FOR SOLUTION ORAL at 09:08

## 2025-05-18 RX ADMIN — SUCRALFATE 1 G: 1 TABLET ORAL at 21:44

## 2025-05-18 RX ADMIN — ACETAMINOPHEN 650 MG: 325 TABLET, FILM COATED ORAL at 04:51

## 2025-05-18 RX ADMIN — IRON SUCROSE 300 MG: 20 INJECTION, SOLUTION INTRAVENOUS at 12:42

## 2025-05-18 RX ADMIN — PANTOPRAZOLE SODIUM 40 MG: 40 TABLET, DELAYED RELEASE ORAL at 17:18

## 2025-05-18 ASSESSMENT — ACTIVITIES OF DAILY LIVING (ADL)
ADLS_ACUITY_SCORE: 69
ADLS_ACUITY_SCORE: 69
ADLS_ACUITY_SCORE: 66
ADLS_ACUITY_SCORE: 69
ADLS_ACUITY_SCORE: 69
ADLS_ACUITY_SCORE: 67
ADLS_ACUITY_SCORE: 69
ADLS_ACUITY_SCORE: 67
ADLS_ACUITY_SCORE: 67
ADLS_ACUITY_SCORE: 69
ADLS_ACUITY_SCORE: 67
ADLS_ACUITY_SCORE: 69
ADLS_ACUITY_SCORE: 66
ADLS_ACUITY_SCORE: 67

## 2025-05-18 NOTE — PLAN OF CARE
Goal Outcome Evaluation:    Pt. A&Ox4. Up x2 Sera steady, not OOB this shift, WBAT of LLE. Regular diet. Purwick in place. Pain managed with oxy and tylenol. Dressing on L hip CDI. Awaiting TCU acceptance.

## 2025-05-18 NOTE — PROGRESS NOTES
Orthopedic Surgery  Vandana Gonzalez  05/18/2025     Admit Date:  5/13/2025  Assessment:   POD: 4 Days Post-Op   Procedure(s):  OPEN REDUCTION INTERNAL FIXATION, FRACTURE, FEMUR, USING INTRAMEDULLARY FRANCISCO AND FRACTURE TABLE, LEFT     Patient resting comfortably in bed. She endorses pain post operatively, and unable to lift leg. 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   Tolerating oral intake  Denies nausea or vomiting  Denies chest pain or shortness of breath  VSS, afebrile    Temp:  [98  F (36.7  C)-99.6  F (37.6  C)] 99.6  F (37.6  C)  Pulse:  [73-89] 82  Resp:  [12-16] 12  BP: (113-138)/(63-78) 129/65  SpO2:  [92 %-99 %] 96 %    Alert and oriented  Dressing is clean, dry, and intact   Minimal erythema of the surrounding skin aside from birthmark. No changes from baseline   Bilateral calves are soft, non-tender  Lymphatic congestion (also baseline)   Left lower extremity is NVI  Sensation intact bilateral lower extremities  Patient able to resist dorsi and plantar flexion bilaterally  +Dp pulse    Labs:  Recent Labs   Lab Test 05/18/25  0727 05/17/25  0755 05/16/25  1843 05/15/25  1945 05/15/25  1106   WBC 5.2 5.2  --   --  7.8   HGB 7.5* 7.5*  7.5* 8.3*   < > 7.3*    169  --   --  168    < > = values in this interval not displayed.     Recent Labs   Lab Test 05/13/25  0300 10/07/21  0554   INR 1.08 1.12     LEFT FEMUR XR IMPRESSION (obtained 5/17/2025): Interval IM francisco and screw fixation of the left intertrochanteric femur fracture with cement in the femoral head surrounding the distal aspect of the femoral neck screw. No immediate postoperative complication. Instrumentation across a healed   fracture deformity of the distal femur, unchanged. No new fracture. Osseous demineralization. Scattered postsurgical gas in the soft tissues surrounding the left hip.      Plan:   Recheck Hgb 5/19 6 am   Continue Lovenox for DVT  prophylaxis    Lovenox for 6 weeks    Mobilize with PT/OT, fall precautions    WBAT LLE with assistive device    Continue current pain regimen   Dressings: Keep intact.  Change if >60% saturated or peeling off    Follow-up: 2 weeks post-op with Miley/ Dr Nolasco's team   Social work coordinating discharge planning    Disposition:    Anticipate d/c to TCU when medically cleared and progressing in PT. Ortho stable for discharge which is planned for tomorrow.     Rubina Davis PA-C  [facility]

## 2025-05-18 NOTE — PROGRESS NOTES
"[facility]    Medicine Progress Note - Hospitalist Service    Date of Admission:  5/13/2025    Assessment & Plan   Vandana Gonzalez is a 64 year old female with past medical history significant for osteoporosis, breast cancer who presents with fall and left hip pain. Admitted on 5/13/2025.      Acute left intertrochanteric femur fracture, mildly displaced  S/p  Left intramedullary nail fixation on 5/14/25  Mechanical fall  Osteoporosis      *L hip XRay 5/12/25 =    mildly displaced left intertrochanteric hip fracture  *RCRI 0. Denies any chest pain or shortness of breath.   Can do work of 4+ mets at baseline.   EKG 5/12/25 is NSR  - Orthopedic surgery consulted, IM nail fixation 5/14.       -  post op orders  (pain, activity, wound care, VTE prophyllaxis.) per ortho surgeon   - ortho surgery recommends waiting to start lovenox VTE prophyllaxis once Hgb> 8.5     - Scheduled acetaminophen   - Oxycodone PO PRN, hydromorphone IV PRN       Post op anemia  Acute on chronic anemia  H/o esophageal ulcers  H/o Low B12   -1uPRBC 5/15  -Estimated 500 cc blood loss intra-op  - patient has chronic anemia prior to admission   - B12 962 and folate level 33 this admission  - patient reports she used to take oral iron, \"I forgot once I ran out last year\"   - chronic anemia due to CONRADO (esophageal ulcer history), low B12 (=185 in 2022)  - surveillance EGDs (several, as recent as 2025) and 2022 colonoscopy show no malignancy (reassuring)    - hematology consulted, see 5/16 note  - plan IV venofer  - resume enoxaparin (no evidence of bleeding, high risk for VTE)  - oral iron  - continue PPI and sucralfate with understanding that this may inhibit iron absorption  - monitor for blood loss      Klippel Trenaunay Syndrome  ( 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   Chronic lymphedema, L>R  Chronic " "left lower extremity wound, POA  Chronic port wine discoloration left leg with lymphedema and chronic ulceration   - Wound RN consulted      Breast cancer  -Right breast Invasive Ductal Carcinoma ER positive, ND negative, HER2 negative   Stage IA (pT1b, pN0(sn), cM0, G2, Oncotype DX score: 26)   - R lumpectomy 11/2024,  status post radiation 1/2025  -Follows with  oncology. Treated with lumpectomy, radiation, tamoxifen.   - Continue prior to admission tamoxifen       Hypertension  - Hold prior to admission telmisartan (losartan) due to soft BPs  (soft BPs likely due to post op anemia)      GERD  - Continue prior to admission PPI  ,  sucralfate     Alcohol overuse  Reports drinking 2-3 beers daily. Denies any history of withdrawal.  - Discussed recommended alcohol limits  - Monitor clinically for any signs of withdrawal           Diet: Advance Diet as Tolerated: Regular Diet Adult  Diet    DVT Prophylaxis: Enoxaparin (Lovenox) SQ  Lemons Catheter: Not present  Lines: None     Cardiac Monitoring: None  Code Status: Full Code      Clinically Significant Risk Factors                   # Hypertension: Noted on problem list            # Obesity: Estimated body mass index is 30.27 kg/m  as calculated from the following:    Height as of this encounter: 1.753 m (5' 9\").    Weight as of this encounter: 93 kg (205 lb).        # Financial/Environmental Concerns: none         Social Drivers of Health    Physical Activity: Patient Declined (10/4/2024)    Exercise Vital Sign     Days of Exercise per Week: Patient declined     Minutes of Exercise per Session: Patient declined   Stress: Stress Concern Present (10/4/2024)    [place] [employer]ational [employer] - Occupational Stress Questionnaire     Feeling of Stress : To some extent   Social Connections: Unknown (10/4/2024)    Social Connection and Isolation Panel [NHANES]     Frequency of Social Gatherings with Friends and Family: Patient declined          Disposition Plan "     Medically Ready for Discharge: Ready Now             Ced Vora MD  Hospitalist Service  [facility]  Securely message with [employer] (more info)  Text page via [employer] Paging/Directory   ______________________________________________________________________    Interval History   Seen in AM.  Symptoms and pain controlled. No new bleeding  Hgb stable  Discussed discharge. Unable to leave today as insurance auth or some other issue pending. Will discharge tomorrow.   Discussed with RN, ortho team, and SW.     Physical Exam   Vital Signs: Temp: 99.3  F (37.4  C) Temp src: Oral BP: 138/68 Pulse: 73   Resp: 16 SpO2: 98 % O2 Device: None (Room air)    Weight: 205 lbs 0 oz    Constitutional: Awake, alert, cooperative, no apparent distress  Respiratory: Clear to auscultation bilaterally, no crackles or wheezing  Cardiovascular: Regular rate and rhythm, normal S1 and S2, and no murmur noted  GI: Normal bowel sounds, soft, non-distended, non-tender  Skin/Integumen: No rashes, no cyanosis, no edema. L leg with dressing intact, no clear areas of firmness, fluctuance, chronic lymphedema and skin discoloration of leg noted.   Other:       Medical Decision Making       40 MINUTES SPENT BY ME on the date of service doing chart review, history, exam, documentation & further activities per the note.      Data   ------------------------- PAST 24 HR DATA REVIEWED -----------------------------------------------    I have personally reviewed the following data over the past 24 hrs:    5.2  \   7.5 (L)   / 201     139 107 6.8 (L) /  94   3.6 22 0.50 (L) \       Imaging results reviewed over the past 24 hrs:   Recent Results (from the past 24 hours)   XR Femur Left 2 Views    Narrative    EXAM: XR FEMUR LEFT 2 VIEWS  LOCATION: [facility]  DATE: 5/17/2025    INDICATION: post operative pain, eval hardware  COMPARISON: 05/13/2025      Impression    IMPRESSION: Interval IM francisco and screw  fixation of the left intertrochanteric femur fracture with cement in the femoral head surrounding the distal aspect of the femoral neck screw. No immediate postoperative complication. Instrumentation across a healed   fracture deformity of the distal femur, unchanged. No new fracture. Osseous demineralization. Scattered postsurgical gas in the soft tissues surrounding the left hip.

## 2025-05-18 NOTE — PROGRESS NOTES
Care Management Follow Up    Length of Stay (days): 5    Expected Discharge Date: 05/19/2025     Concerns to be Addressed: discharge planning     Patient plan of care discussed at interdisciplinary rounds: Yes    Anticipated Discharge Disposition: Transitional Care  Disposition Comments: [facility]    [facility] [address]  [address] [address]     Anticipated Discharge Services: Transportation Services  Anticipated Discharge DME:      Patient/family educated on Medicare website which has current facility and service quality ratings:    Education Provided on the Discharge Plan:  Yes  Patient/Family in Agreement with the Plan: yes    Referrals Placed by CM/SW: Senior Linkage Line, Post Acute Facilities, Transportation  Private pay costs discussed: transportation costs Reviewed wheelchair transportation costs: Base rate $94.48 and Mileage is 6.08 a mile.    Discussed  Partnership in Safe Discharge Planning  document with patient/family: No     Handoff Completed: No, handoff not indicated or clinically appropriate    Additional Information:   spoke with Verito with [facility] who confirmed that they would have a bed at [facility] or [facility].  met with patient and she would like to confirm a shared room at [facility]. Patient requesting transportation to be arranged.  placed call to [facility] to arrange for a wheelchair transport for 5/19 at 12:05 - 12:50. Bedside nurse confirmed patient can transport via wheelchair. Updated Verito and care team of discharge plan. Needs orders 2 hours before discharge.     PAS-RR    D: Per DHS regulation, BAHMAN completed and submitted PAS-RR to [place] [employer] on [employer]ging Direct Connect via the Senior LinkAge Line.  PAS-RR confirmation # is : [id number]    P: Further questions may be directed to [facility] at #[phone], option #4 for PAS-RR staff.      Next Steps:     TABITHA Caba, [facility]  Inpatient Care Coordination   Social Work  [phone]  [facility]

## 2025-05-19 ENCOUNTER — DOCUMENTATION ONLY (OUTPATIENT)
Dept: GERIATRICS | Facility: CLINIC | Age: 65
End: 2025-05-19
Payer: COMMERCIAL

## 2025-05-19 ENCOUNTER — LAB REQUISITION (OUTPATIENT)
Dept: LAB | Facility: CLINIC | Age: 65
End: 2025-05-19

## 2025-05-19 VITALS
HEART RATE: 65 BPM | OXYGEN SATURATION: 94 % | BODY MASS INDEX: 30.36 KG/M2 | WEIGHT: 205 LBS | DIASTOLIC BLOOD PRESSURE: 89 MMHG | RESPIRATION RATE: 18 BRPM | TEMPERATURE: 98.4 F | SYSTOLIC BLOOD PRESSURE: 136 MMHG | HEIGHT: 69 IN

## 2025-05-19 DIAGNOSIS — Z00.01 ENCOUNTER FOR GENERAL ADULT MEDICAL EXAMINATION WITH ABNORMAL FINDINGS: ICD-10-CM

## 2025-05-19 LAB
ERYTHROCYTE [DISTWIDTH] IN BLOOD BY AUTOMATED COUNT: 18.9 % (ref 10–15)
HCT VFR BLD AUTO: 25.6 % (ref 35–47)
HGB BLD-MCNC: 8.1 G/DL (ref 11.7–15.7)
MCH RBC QN AUTO: 28.2 PG (ref 26.5–33)
MCHC RBC AUTO-ENTMCNC: 31.6 G/DL (ref 31.5–36.5)
MCV RBC AUTO: 89 FL (ref 78–100)
PLATELET # BLD AUTO: 225 10E3/UL (ref 150–450)
POTASSIUM SERPL-SCNC: 3.7 MMOL/L (ref 3.4–5.3)
RBC # BLD AUTO: 2.87 10E6/UL (ref 3.8–5.2)
WBC # BLD AUTO: 5.4 10E3/UL (ref 4–11)

## 2025-05-19 PROCEDURE — 250N000013 HC RX MED GY IP 250 OP 250 PS 637

## 2025-05-19 PROCEDURE — 99239 HOSP IP/OBS DSCHRG MGMT >30: CPT | Performed by: STUDENT IN AN ORGANIZED HEALTH CARE EDUCATION/TRAINING PROGRAM

## 2025-05-19 PROCEDURE — 84132 ASSAY OF SERUM POTASSIUM: CPT | Performed by: INTERNAL MEDICINE

## 2025-05-19 PROCEDURE — 85014 HEMATOCRIT: CPT | Performed by: STUDENT IN AN ORGANIZED HEALTH CARE EDUCATION/TRAINING PROGRAM

## 2025-05-19 PROCEDURE — 36415 COLL VENOUS BLD VENIPUNCTURE: CPT | Performed by: STUDENT IN AN ORGANIZED HEALTH CARE EDUCATION/TRAINING PROGRAM

## 2025-05-19 RX ADMIN — OXYCODONE HYDROCHLORIDE 5 MG: 5 TABLET ORAL at 08:36

## 2025-05-19 RX ADMIN — SUCRALFATE 1 G: 1 TABLET ORAL at 11:20

## 2025-05-19 RX ADMIN — ACETAMINOPHEN 650 MG: 325 TABLET, FILM COATED ORAL at 11:20

## 2025-05-19 RX ADMIN — TAMOXIFEN CITRATE 20 MG: 10 TABLET, FILM COATED ORAL at 08:36

## 2025-05-19 RX ADMIN — POLYETHYLENE GLYCOL 3350 17 G: 17 POWDER, FOR SOLUTION ORAL at 08:37

## 2025-05-19 RX ADMIN — PANTOPRAZOLE SODIUM 40 MG: 40 TABLET, DELAYED RELEASE ORAL at 05:30

## 2025-05-19 RX ADMIN — SENNOSIDES AND DOCUSATE SODIUM 1 TABLET: 50; 8.6 TABLET ORAL at 08:37

## 2025-05-19 RX ADMIN — ACETAMINOPHEN 650 MG: 325 TABLET, FILM COATED ORAL at 05:30

## 2025-05-19 RX ADMIN — SUCRALFATE 1 G: 1 TABLET ORAL at 08:36

## 2025-05-19 RX ADMIN — OXYCODONE HYDROCHLORIDE 10 MG: 5 TABLET ORAL at 15:21

## 2025-05-19 ASSESSMENT — ACTIVITIES OF DAILY LIVING (ADL)
ADLS_ACUITY_SCORE: 69

## 2025-05-19 NOTE — PROGRESS NOTES
Care Management Discharge Note    Discharge Date: 05/19/2025       Discharge Disposition: Transitional Care    Discharge Services: Transportation Services    Discharge DME:      Discharge Transportation: car, drives self, family or friend will provide    Private pay costs discussed: Not applicable    Does the patient's insurance plan have a 3 day qualifying hospital stay waiver?  No    PAS Confirmation Code: [id number]  Patient/family educated on Medicare website which has current facility and service quality ratings:      Education Provided on the Discharge Plan:    Persons Notified of Discharge Plans: huc, bedside RN , charge Rn, bedside RN to notify pt, and stella with [facility].  Patient/Family in Agreement with the Plan: yes    Handoff Referral Completed: No, handoff not indicated or clinically appropriate    Additional Information:  Writer spoke with previous  this morning who assisted in sending discharge orders and passr to tcu. See social wroker note for 5/19/25 at 753am.   Writer informed by Verito with [facility] tcu that prior auth has not been approved. Writer also spoke with stella with [facility] who states that transport should be changed to a little later to allow time for prior auth to be received.  Writer spoke with Radha with [facility] transport and transport was changed from [id number] set up by yesterday's  to new time of [phone] today.  Writer updated later by Lola that prior authorization was approved. Writer informed Verito of new transport time.  Writer updated bedside RN of new transport time. Writer asked bedside RN to update pt of new transport time to [facility]. Writer asked bedside RN to notify writer if pt has any questions or needs to speak to writer in person. Bedside Rn is agreeable. Writer updated charge Rn and huc of transport time as well.     Darlene Torrez, BSW  Social Work  [facility]

## 2025-05-19 NOTE — DISCHARGE SUMMARY
"[facility]  Hospitalist Discharge Summary      Date of Admission:  5/13/2025  Date of Discharge:  5/19/2025  Discharging Provider: Ced Vora MD  Discharge Service: Hospitalist Service    Discharge Diagnoses   Acute left intertrochanteric femur fracture, mildly displaced  S/p  Left intramedullary nail fixation on 5/14/25  Mechanical fall  Osteoporosis   Post op anemia  Acute on chronic anemia  H/o esophageal ulcers  H/o Low B12   Klippel Trenaunay Syndrome  ( 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   Chronic lymphedema, L>R  Chronic left lower extremity wound, POA  Breast cancer  Hypertension  GERD  Alcohol overuse    Clinically Significant Risk Factors     # Obesity: Estimated body mass index is 30.27 kg/m  as calculated from the following:    Height as of this encounter: 1.753 m (5' 9\").    Weight as of this encounter: 93 kg (205 lb).       Follow-ups Needed After Discharge   Follow-up Appointments       Follow Up and recommended labs and tests      Follow up with intermediate physician.  The following labs/tests are recommended: HGB in 5-10 days.    Follow up with Oncology for further IV iron doses in the next 1-2 weeks    Follow up with orthopedics as scheduled.        Follow Up and recommended labs and tests      Please call as soon as possible to make an appointment to be seen in Dr. Nik Nolasco's clinic at 2 weeks postop for a recheck of your surgical site, possible repeat x-rays, and wound care. If you are at a TCU at this time and they have x-ray capabilities, you may complete your wound care and x-rays at your TCU and have them send your images to Dr. Nolasco's office.     Dr. Nolasco's care coordinator is Martha. Please contact her at [phone] to schedule an appointment. Dr. Nolasco's care team's fax number is [phone].     Dr. Nolasco sees " patients at 3 clinic locations:  [facility]  [address], [place] [address]  [facility]  [address], [place] [address]  [facility]  [address], [place] [address]      Please call the on-call phone number [phone] during evenings, nights and weekends for any urgent needs. Prescription refills must be done during business hours by contacting your surgical team.                Unresulted Labs Ordered in the Past 30 Days of this Admission       No orders found from 4/13/2025 to 5/14/2025.        These results will be followed up by n/a    Discharge Disposition   Discharged to rehabilitation facility  Condition at discharge: Stable    Hospital Course   Vandana Gonzalez is a 64 year old female with past medical history significant for osteoporosis, breast cancer who was admitted on 5/13/2025 for left intertrochanteric femur fracture and underwent left intramedullary nail fixation on 5/14/25. Her post op course was complicated by anemia. She received IV iron for this.    She will discharge to TCU for further rehab. She was recommended to follow up with oncology to assess if further workup of her anemia or IV iron is required.      Acute left intertrochanteric femur fracture, mildly displaced  S/p  Left intramedullary nail fixation on 5/14/25  Mechanical fall  Osteoporosis   Hx of prior left distal femur fracture with retained hardware.     *L hip XRay 5/12/25 =    mildly displaced left intertrochanteric hip fracture  *RCRI 0. Denies any chest pain or shortness of breath.   Can do work of 4+ mets at baseline.   EKG 5/12/25 is NSR  - Orthopedic surgery consulted, IM nail fixation 5/14.       -  post op orders  (pain, activity, wound care, VTE ppx.) per ortho surgeon   - enoxaparin for VTE PPx  - Scheduled acetaminophen   - Oxycodone PO PRN     Post op anemia  Acute on chronic anemia  H/o esophageal ulcers  H/o Low  "B12   -1uPRBC 5/15  -Estimated 500 cc blood loss intra-op  - patient has chronic anemia prior to admission   - B12 962 and folate level 33 this admission  - patient reports she used to take oral iron, \"I forgot once I ran out last year\"   - chronic anemia due to CONRADO (esophageal ulcer history), low B12 (=185 in 2022)  - surveillance EGDs (several, as recent as 2025) and 2022 colonoscopy show no malignancy (reassuring)     - hematology consulted, see 5/16 note  - plan IV venofer  - continue PPI and sucralfate with understanding that this may inhibit iron absorption      Klippel Trenaunay Syndrome  ( 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   Chronic lymphedema, L>R  Chronic left lower extremity wound, POA  Chronic port wine discoloration left leg with lymphedema and chronic ulceration   - Wound RN consulted      Breast cancer  -Right breast Invasive Ductal Carcinoma ER positive, NC negative, HER2 negative   Stage IA (pT1b, pN0(sn), cM0, G2, Oncotype DX score: 26)   - R lumpectomy 11/2024,  status post radiation 1/2025  -Follows with [facility] oncology. Treated with lumpectomy, radiation, tamoxifen.   - Continue prior to admission tamoxifen       Hypertension  - Hold prior to admission telmisartan (losartan) due to soft BPs  (soft BPs likely due to post op anemia)      GERD  - Continue prior to admission PPI  ,  sucralfate     Alcohol overuse  Reports drinking 2-3 beers daily. Denies any history of withdrawal.  - Discussed recommended alcohol limits    Consultations This Hospital Stay   ORTHOPEDIC SURGERY IP CONSULT  WOUND OSTOMY CONTINENCE NURSE  IP CONSULT  PHYSICAL THERAPY ADULT IP CONSULT  OCCUPATIONAL THERAPY ADULT IP CONSULT  CARE MANAGEMENT / SOCIAL WORK IP CONSULT  HEMATOLOGY & ONCOLOGY IP CONSULT  NURSING TO CONSULT FOR VASCULAR ACCESS CARE IP CONSULT  NURSING TO CONSULT FOR VASCULAR ACCESS CARE IP " CONSULT  CARE MANAGEMENT / SOCIAL WORK IP CONSULT  PHYSICAL THERAPY ADULT IP CONSULT  OCCUPATIONAL THERAPY ADULT IP CONSULT    Code Status   Full Code    Time Spent on this Encounter   I, Ced Vora MD, personally saw the patient today and spent greater than 30 minutes discharging this patient.       Ced Vora MD  [facility]  [address] [place] [address]  Phone: [phone]  Fax: [phone]  ______________________________________________________________________    Physical Exam   Vital Signs: Temp: 98.5  F (36.9  C) Temp src: Oral BP: 128/71 Pulse: 81   Resp: 18 SpO2: 94 % O2 Device: None (Room air)    Weight: 205 lbs 0 oz  Constitutional: Awake, alert, cooperative, no apparent distress  Respiratory: Clear to auscultation bilaterally, no crackles or wheezing  Cardiovascular: Regular rate and rhythm, normal S1 and S2, and no murmur noted  GI: Normal bowel sounds, soft, non-distended, non-tender  Skin/Integumen: No rashes, no cyanosis, no edema. L leg with dressing intact, no clear areas of firmness, fluctuance, chronic lymphedema and skin discoloration of leg noted.        Primary Care Physician   Antonio Mccauley    Discharge Orders      Primary Care - Care Coordination Referral      General info for SNF    Length of Stay Estimate: Short Term Care: Estimated # of Days <30  Condition at Discharge: Improving  Level of care:skilled   Rehabilitation Potential: Good  Admission H&P remains valid and up-to-date: Yes  Recent Chemotherapy: N/A  Use Nursing Home Standing Orders: Yes     Mantoux instructions    Give two-step Mantoux (PPD) Per Facility Policy Yes     Follow Up and recommended labs and tests    Follow up with long-term physician.  The following labs/tests are recommended: HGB in 5-10 days.    Follow up with Oncology for further IV iron doses in the next 1-2 weeks    Follow up with orthopedics as scheduled.     Activity - Up with nursing  assistance     Wound care (specify)    Do not submerge the surgical site in water. You may cover the dressing for showers. Ideally, leave dressing intact until 2 week follow-up. May change the dressing if saturated > 60%.     Follow Up and recommended labs and tests    Please call as soon as possible to make an appointment to be seen in Dr. Nik Nolasco's clinic at 2 weeks postop for a recheck of your surgical site, possible repeat x-rays, and wound care. If you are at a TCU at this time and they have x-ray capabilities, you may complete your wound care and x-rays at your TCU and have them send your images to Dr. Nolasco's office.     Dr. Nolasco's care coordinator is Martha. Please contact her at [phone] to schedule an appointment. Dr. Nolasco's care team's fax number is [phone].     Dr. Nolasco sees patients at 3 clinic locations:  [facility]  [address], [place] [address]  [facility]  [address] [place] [address]  [facility]  [address], [place] [address]      Please call the on-call phone number [phone] during evenings, nights and weekends for any urgent needs. Prescription refills must be done during business hours by contacting your surgical team.     Weight bearing status    WBAT LLE     Reason for your hospital stay    Left femur fracture requiring surgical fixation.    You had post op anemia due to blood loss as well as iron deficiency.     Your blood pressure med is being held due to lower blood pressures. As you get stronger and your hemoglobin recovers, your blood pressure should go up. Discuss with your doctor before resuming.     Physical Therapy Adult Consult    Evaluate and treat as clinically indicated.    Reason:  left femur fracture     Occupational Therapy Adult Consult    Evaluate and treat as clinically indicated.    Reason:  left femur fracture     Fall precautions      Crutches DME    DME Documentation: Describe the reason for need to support medical necessity: Impaired gait status post hip surgery. I, the undersigned, certify that the above prescribed supplies are medically necessary for this patient and is both reasonable and necessary in reference to accepted standards of medical practice in the treatment of this patient's condition and is not prescribed as a convenience.     Cane DME    DME Documentation: Describe the reason for need to support medical necessity: Impaired gait status post hip surgery. I, the undersigned, certify that the above prescribed supplies are medically necessary for this patient and is both reasonable and necessary in reference to accepted standards of medical practice in the treatment of this patient's condition and is not prescribed as a convenience.     Walker DME    DME Documentation: Describe the reason for need to support medical necessity: Impaired gait status post hip surgery. I, the undersigned, certify that the above prescribed supplies are medically necessary for this patient and is both reasonable and necessary in reference to accepted standards of medical practice in the treatment of this patient's condition and is not prescribed as a convenience.     Diet    Follow this diet upon discharge: Current Diet:Orders Placed This Encounter      Advance Diet as Tolerated: Regular Diet Adult       Significant Results and Procedures   Most Recent 3 CBC's:  Recent Labs   Lab Test 05/19/25  0545 05/18/25  0727 05/17/25  0755   WBC 5.4 5.2 5.2   HGB 8.1* 7.5* 7.5*  7.5*   MCV 89 89 89  89    201 169     Most Recent 3 BMP's:  Recent Labs   Lab Test 05/19/25  0545 05/18/25  0727 05/16/25  0834 05/16/25  0546 05/16/25  0543 05/15/25  1106 05/15/25  0609 05/14/25  0751   NA  --  139  --   --   --  135  --  137   POTASSIUM 3.7 3.6 3.8   < >  --  3.7  --  3.5   CHLORIDE  --  107  --   --   --  105  --  103   CO2  --  22  --   --   --  20*  --   21*   BUN  --  6.8*  --   --   --  11.7  --  5.3*   CR  --  0.50*  --   --   --  0.77  --  0.44*   ANIONGAP  --  10  --   --   --  10  --  13   MATTEO  --  8.5*  --   --   --  8.2*  --  8.6*   GLC  --  94  --   --  126* 108*   < > 109*    < > = values in this interval not displayed.     Most Recent 2 LFT's:  Recent Labs   Lab Test 10/07/24  1033 07/11/23  0848   AST 19 24   ALT 13 19   ALKPHOS 70 63   BILITOTAL 0.4 0.4     Most Recent Anemia Panel:  Recent Labs   Lab Test 05/19/25  0545 05/16/25  1843 05/16/25  0834 05/14/25  2115 05/14/25  0751 05/13/25  0300 10/06/21  2344 10/06/21  1722   WBC 5.4   < >  --    < > 5.4 8.5   < > 6.5   HGB 8.1*   < >  --    < > 9.3* 9.3*   < > 5.0*   HCT 25.6*   < >  --    < > 30.1* 29.2*   < > 20.4*   MCV 89   < >  --    < > 87 84   < > 62*      < >  --    < > 202 239   < > 400   IRON  --   --  15*  --   --   --    < > 16*   IRONSAT  --   --  6*  --   --   --    < > 4*   RETICABSCT  --   --   --   --   --   --   --  0.054   RETP  --   --   --   --   --   --   --  1.7   FEB  --   --  232*  --   --   --    < > 449*   RIO  --   --  56  --   --   --    < > 6*   B12  --   --   --   --   --  962   < >  --    FOLIC  --   --   --   --  33.1  --   --   --     < > = values in this interval not displayed.   ,   Results for orders placed or performed during the hospital encounter of 05/13/25   XR Pelvis w Hip Left 1 View    Narrative    EXAM: XR PELVIS AND HIP LEFT 1 VIEW  LOCATION: [facility]  DATE: 5/13/2025    INDICATION: Pain  COMPARISON: None.      Impression    IMPRESSION: Mildly displaced left intertrochanteric hip fracture. No other fracture or dislocation. Right hip arthroplasty.   XR Femur Left 2 Views    Narrative    EXAM: XR FEMUR LEFT 2 VIEWS  LOCATION: [facility]  DATE: 5/13/2025    INDICATION: prior femur fracture with hardware  COMPARISON: 05/13/2025 0316 hours      Impression    IMPRESSION: Acute comminuted mildly  displaced intertrochanteric fracture of the left proximal femur. Normal alignment of the left hip with maintained joint space. Healed fracture of the distal femur with indwelling plate and screw fixation. Normal left   knee alignment with mild degenerative change. Diffuse osseous demineralization which limits evaluation for nondisplaced fractures and subtle osseous lesions.   XR Surgery COURTNEY L/T 5 Min Fluoro w Stills    Narrative    This exam was marked as non-reportable because it will not be read by a   radiologist or a [place] non-radiologist provider.         XR Femur Left 2 Views    Narrative    EXAM: XR FEMUR LEFT 2 VIEWS  LOCATION: [facility]  DATE: 5/17/2025    INDICATION: post operative pain, eval hardware  COMPARISON: 05/13/2025      Impression    IMPRESSION: Interval IM francisco and screw fixation of the left intertrochanteric femur fracture with cement in the femoral head surrounding the distal aspect of the femoral neck screw. No immediate postoperative complication. Instrumentation across a healed   fracture deformity of the distal femur, unchanged. No new fracture. Osseous demineralization. Scattered postsurgical gas in the soft tissues surrounding the left hip.       Discharge Medications   Current Discharge Medication List        START taking these medications    Details   acetaminophen (TYLENOL) 325 MG tablet Take 2 tablets (650 mg) by mouth every 8 hours.  Qty: 100 tablet, Refills: 0    Associated Diagnoses: Closed displaced intertrochanteric fracture of left femur, initial encounter (H)      enoxaparin ANTICOAGULANT (LOVENOX) 40 MG/0.4ML syringe Inject 0.4 mLs (40 mg) subcutaneously every 24 hours.    Associated Diagnoses: Closed displaced intertrochanteric fracture of left femur, initial encounter (H)      oxyCODONE (ROXICODONE) 5 MG tablet Take 1 tablet (5 mg) by mouth every 4 hours as needed for moderate to severe pain.  Qty: 20 tablet, Refills: 0    Associated Diagnoses:  Closed displaced intertrochanteric fracture of left femur, initial encounter (H)      senna-docusate (SENOKOT-S/PERICOLACE) 8.6-50 MG tablet Take 1 tablet by mouth 2 times daily as needed for constipation.  Qty: 30 tablet, Refills: 0    Associated Diagnoses: Closed displaced intertrochanteric fracture of left femur, initial encounter (H)           CONTINUE these medications which have NOT CHANGED    Details   pantoprazole (PROTONIX) 40 MG EC tablet Take 1 tablet (40 mg) by mouth 2 times daily  Qty: 60 tablet, Refills: 0    Comments: Future refills by PCP Dr. Chavez who can be reached at his office : [phone].  Associated Diagnoses: Anemia, unspecified type      sucralfate (CARAFATE) 1 GM tablet Take 1 g by mouth 4 times daily.      tamoxifen (NOLVADEX) 20 MG tablet Take 20 mg by mouth daily.      telmisartan (MICARDIS) 80 MG tablet TAKE 1 TABLET(80 MG) BY MOUTH DAILY  Qty: 90 tablet, Refills: 3    Associated Diagnoses: Klippel Trenaunay syndrome; Edema of lower extremity; Primary hypertension      COMPRESSION STOCKINGS For DAILY USE 40-50 mmhg compression. Open toe thigh high  Qty: 2 each, Refills: 1    Associated Diagnoses: Klippel Trenaunay syndrome; Edema of lower extremity           Allergies   Allergies   Allergen Reactions    Contrast Dye Hives    No Known Allergies

## 2025-05-19 NOTE — PLAN OF CARE
Occupational Therapy Discharge Summary    Reason for therapy discharge:    Discharged to transitional care facility.    Progress towards therapy goal(s). See goals on Care Plan in [facility] electronic health record for goal details.  Goals partially met.  Barriers to achieving goals:   discharge from facility.    Therapy recommendation(s):    Continued therapy is recommended.  Rationale/Recommendations:  Pt functioning below baseline with noted impairments in activity tolerance, balance, strength, mobility, pain, impacting safety/independence with I/ADLs. Pt resides with spouse, ind with most IADLs and all ADLs. Currently, pt completes mobility and self cares with [facility]. Recommend skilled TCU for increased I/ADL independence. Pt really wants to return home. OT will continue to follow.

## 2025-05-19 NOTE — PLAN OF CARE
Goal Outcome Evaluation:  Summary:    Shift Summary [id number]    Admitting Diagnosis: Closed displaced intertrochanteric fracture of left femur, initial encounter (H) [S73.809A]   Vitals. Vitally stable   Pain 4 /10. Taking scheduled Tylenol  PRN. Last dose   A&Ox4 she is very sad refused meal   Voiding. Pure wick   Mobility 2 assist nai steady   Tele .   CMS.   Lung Sounds  clear on Ra via   GI. Active passing gas no BM   Dressing. L femur dressing CDI     Orders Placed This Encounter      Advance Diet as Tolerated: Regular Diet Adult      Diet       Plan: Discharge to TCU at 3-4 pm today   Discharged handoff was given to  upon discharged , all patient personal  and IV access removed.

## 2025-05-19 NOTE — PROGRESS NOTES
Admitting Diagnosis: Closed displaced intertrochanteric fracture of left femur, initial encounter (H) [K69.173R]   Vitals VSS on RA  A&Ox4  Voiding Yes  Mobility Assist of 2 with nai connors  CMS: L hip replacement, pod #5 weakness/painful  Lung Sounds Clear on RA via n/a  GI Active, passing gas  Dressing CDI     Orders Placed This Encounter      Advance Diet as Tolerated: Regular Diet Adult  Discharge to TCU

## 2025-05-19 NOTE — PROGRESS NOTES
Shift Summary [id number]    Admitting Diagnosis: Closed displaced intertrochanteric fracture of left femur, initial encounter (H) [R26.496K]   Vitals:Baseline  Pain:PRN oxy  A&Ox4  Voiding:Purewick  Mobility:A/2 W/JANICE  Tele:NA  CMS:Intact  Lung Sounds:Clear  GI:No BM yet  Dressing:CDI    Orders Placed This Encounter      Advance Diet as Tolerated: Regular Diet Adult      Diet       Plan:Possible  To TCU tomorrow.

## 2025-05-19 NOTE — PROGRESS NOTES
Care Management Follow Up    Length of Stay (days): 6    Expected Discharge Date: 05/19/2025     Concerns to be Addressed: discharge planning  PT is recommending TCU and Pt is refusing and would like to do HHC instead  Patient plan of care discussed at interdisciplinary rounds: Yes    Anticipated Discharge Disposition: Transitional Care  Disposition Comments: [facility]      Anticipated Discharge Services: Transportation Services  Anticipated Discharge DME:      Patient/family educated on Medicare website which has current facility and service quality ratings:    Education Provided on the Discharge Plan:    Patient/Family in Agreement with the Plan: yes    Referrals Placed by CM/SW: Senior Linkage Line, Post Acute Facilities, Transportation  Private pay costs discussed: Not applicable    Discussed  Partnership in Safe Discharge Planning  document with patient/family: No     Handoff Completed: No, handoff not indicated or clinically appropriate    Additional Information:  Patient accepted to [facility] today with ride scheduled at 3250-0252. Orders written  for discharge today and faxed to [facility]. PAS completed.    Next Steps: scripts to be sent    QUITA Alcaraz

## 2025-05-20 ENCOUNTER — PATIENT OUTREACH (OUTPATIENT)
Dept: CARE COORDINATION | Facility: CLINIC | Age: 65
End: 2025-05-20

## 2025-05-20 ENCOUNTER — TRANSITIONAL CARE UNIT VISIT (OUTPATIENT)
Dept: GERIATRICS | Facility: CLINIC | Age: 65
End: 2025-05-20
Payer: COMMERCIAL

## 2025-05-20 VITALS
HEART RATE: 91 BPM | TEMPERATURE: 98.2 F | RESPIRATION RATE: 18 BRPM | WEIGHT: 205 LBS | SYSTOLIC BLOOD PRESSURE: 133 MMHG | DIASTOLIC BLOOD PRESSURE: 54 MMHG | BODY MASS INDEX: 30.36 KG/M2 | OXYGEN SATURATION: 95 % | HEIGHT: 69 IN

## 2025-05-20 DIAGNOSIS — Z17.0 MALIGNANT NEOPLASM OF RIGHT BREAST IN FEMALE, ESTROGEN RECEPTOR POSITIVE, UNSPECIFIED SITE OF BREAST (H): ICD-10-CM

## 2025-05-20 DIAGNOSIS — I89.0 LYMPHEDEMA: ICD-10-CM

## 2025-05-20 DIAGNOSIS — F10.90 ALCOHOL USE DISORDER: ICD-10-CM

## 2025-05-20 DIAGNOSIS — L97.909 CHRONIC SKIN ULCER OF LOWER LEG (H): ICD-10-CM

## 2025-05-20 DIAGNOSIS — D50.8 OTHER IRON DEFICIENCY ANEMIA: ICD-10-CM

## 2025-05-20 DIAGNOSIS — M81.0 AGE-RELATED OSTEOPOROSIS WITHOUT CURRENT PATHOLOGICAL FRACTURE: ICD-10-CM

## 2025-05-20 DIAGNOSIS — D62 ANEMIA DUE TO BLOOD LOSS, ACUTE: ICD-10-CM

## 2025-05-20 DIAGNOSIS — I10 ESSENTIAL HYPERTENSION: ICD-10-CM

## 2025-05-20 DIAGNOSIS — S72.142D CLOSED DISPLACED INTERTROCHANTERIC FRACTURE OF LEFT FEMUR WITH ROUTINE HEALING, SUBSEQUENT ENCOUNTER: Primary | ICD-10-CM

## 2025-05-20 DIAGNOSIS — C50.911 MALIGNANT NEOPLASM OF RIGHT BREAST IN FEMALE, ESTROGEN RECEPTOR POSITIVE, UNSPECIFIED SITE OF BREAST (H): ICD-10-CM

## 2025-05-20 DIAGNOSIS — K27.9 PUD (PEPTIC ULCER DISEASE): ICD-10-CM

## 2025-05-20 DIAGNOSIS — K22.10 ULCER OF ESOPHAGUS WITHOUT BLEEDING: ICD-10-CM
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
## 2025-05-20 PROCEDURE — P9604 ONE-WAY ALLOW PRORATED TRIP: HCPCS | Performed by: PHYSICIAN ASSISTANT

## 2025-05-20 PROCEDURE — 36415 COLL VENOUS BLD VENIPUNCTURE: CPT | Performed by: PHYSICIAN ASSISTANT

## 2025-05-20 PROCEDURE — 86481 TB AG RESPONSE T-CELL SUSP: CPT | Performed by: PHYSICIAN ASSISTANT

## 2025-05-20 RX ORDER — AMOXICILLIN 250 MG
1 CAPSULE ORAL 2 TIMES DAILY
Status: SHIPPED | DISCHARGE
Start: 2025-05-20

## 2025-05-20 RX ORDER — POLYETHYLENE GLYCOL 3350 17 G/17G
17 POWDER, FOR SOLUTION ORAL DAILY PRN
COMMUNITY

## 2025-05-20 NOTE — PROGRESS NOTES
Clinic Care Coordination Contact  Care Coordination Transition Communication    Clinical Data: Patient was hospitalized at [facility] from 5/13/25 to 5/19/25 with diagnosis of Acute left intertrochanteric femur fracture, mildly displaced  S/p  Left intramedullary nail fixation on 5/14/25  Mechanical fall  Osteoporosis   Post op anemia  Acute on chronic anemia  H/o esophageal ulcers  H/o Low B12   Klippel Trenaunay Syndrome  ( 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   Chronic lymphedema, L>R  Chronic left lower extremity wound, POA  Breast cancer  Hypertension  GERD  Alcohol overuse     Assessment: Patient has transitioned to TCU/ARU for short term rehabilitation:    Facility Name: [facility]   Transition Communication:  Notified facility of Primary Care- Care Coordination support via Epic fax.    Plan: Care Coordinator will await notification from facility staff informing of patient's discharge plans/needs. Care Coordinator will review chart and outreach to facility staff every 4 weeks and as needed.     Rosa Elena Francisco  [facility]  Clinic Care Coordinator  [facility]  [phone]  [email or web address]

## 2025-05-20 NOTE — PROGRESS NOTES
[facility] GERIATRICS  Initial Visit Note  May 20, 2025       PRIMARY CARE PROVIDER AND CLINIC:  Antonio Mccauley MD, [address] / [place] [address]    [place] Medical Record Number:  [id number]  Place of Service where encounter took place:  [facility] (TCU) [[id number]]    Chief Complaint   Patient presents with    Hospital F/U        HPI:  Vandana Gonzalez is a 64 year old (1960) admitted to [facility] (TCU) [[id number]] from [facility].     Reviewed ED note, H&P, discharge summary, labs, and imaging from recent hospital stay       Date of TCU admission: 5/19/25 from [facility]  Referred to this facility for: rehab  Patient's prior living condition: Lives with spouse in a house    Summary  64 year old female with PMHx including osteoporosis and breast cancer who was hospitalized at [facility] 5/13-5/19/25 for a left intertrochanteric femur fracture due to fall, and is s/p left IM nail fixation on 5/14/25. Post-op course complicated by anemia (Hgb emily 7.3) with associated low BP. Heme/Onc consulted. Received 1u pRBCs and treated with IV iron. Plan for ongoing IV iron outpatient as per primary oncologist, Dr. Galvan, at [facility]. PTA telmisartan held inpatient. Also noted to have LLE wound due to edema/ulcer. WOC consulted; local wound care initiated. Referred to TCU for rehab       Today, patient was seen as an initial visit. Facility records reviewed VSS. BIMS 15/15. Patient was seen at bedside. Just took some oxycodone for ongoing LLE pain. OK with current pain regimen for now. Has chronic LLE edema which is currently worse than baseline in setting of fracture and recent surgery; OK with monitoring for now. Denies cp/sob, dizziness/lightheadedness. Code status reviewed; pt was somewhat conflicted, but ultimately expressed desire to be full code.    CODE STATUS: CPR/Full code     ALLERGIES:   Allergies   Allergen Reactions    Contrast Dye  Hives    No Known Allergies         REVIEW OF SYSTEMS:   6 point ROS was completed. Pertinent positives and negatives are noted above in the HPI. All others negative    PAST MEDICAL HISTORY  Past Medical History:   Diagnosis Date    Closed fracture of unspecified part of neck of femur 11/2002    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, unspecified site     large angioma adomen    Hypertensive disorder     Klippel Trenaunay disease 2002    AVM left leg    Phlebitis and thrombophlebitis of other deep vessels of lower extremities 06/2002    AVM left leg    Stasis ulcer of lower extremity (H) 2012    Left-recurrent       PAST SURGICAL HISTORY  Past Surgical History:   Procedure Laterality Date    ARTHROPLASTY HIP ANTERIOR Right 10/28/2021    Procedure: ARTHROPLASTY, RIGHT TOTAL HIP, DIRECT ANTERIOR APPROACH;  Surgeon: Jimmy Pena MD;  Location:  OR    BIOPSY OF BREAST, INCISIONAL  07/2009    phyllodes tumor left    BREAST SURGERY  07/2009    COLONOSCOPY  3-2022    ESOPHAGOSCOPY, GASTROSCOPY, DUODENOSCOPY (EGD), COMBINED N/A 10/07/2021    Procedure: ESOPHAGOGASTRODUODENOSCOPY (EGD);  Surgeon: Romeo Chavez MD;  Location:  GI    FEMUR SURGERY      2002 left side     OPEN REDUCTION INTERNAL FIXATION RODDING INTRAMEDULLAR FEMUR FRACTURE TABLE Left 5/14/2025    Procedure: OPEN REDUCTION INTERNAL FIXATION, FRACTURE, FEMUR, USING INTRAMEDULLARY SAMI AND FRACTURE TABLE;  Surgeon: Nik Nolasco MD;  Location:  OR    ORTHOPEDIC SURGERY  11/2002    repair broken femur    [facility] LIGATN FEMORAL VEIN      multiple vein strippings left    [facility] UGI ENDOSCOPY, SIMPLE EXAM  04/2002    esophageal stricture       FAMILY HISTORY  Reviewed and noncontributory.     SOCIAL HISTORY  Patient's living condition: lives with spouse in a house   reports that she quit smoking about 32 years ago. Her smoking use included cigarettes. She started smoking about 34 years ago. She has a 0.5 pack-year smoking history.  "She has never used smokeless tobacco. She reports current alcohol use. She reports that she does not use drugs.    MEDICATIONS  Post Medication Reconciliation Status: discharge medications reconciled, continue medications without change     Current Outpatient Medications   Medication Sig Dispense Refill    acetaminophen (TYLENOL) 325 MG tablet Take 2 tablets (650 mg) by mouth every 8 hours. 100 tablet 0    COMPRESSION STOCKINGS For DAILY USE 40-50 mmhg compression. Open toe thigh high 2 each 1    enoxaparin ANTICOAGULANT (LOVENOX) 40 MG/0.4ML syringe Inject 0.4 mLs (40 mg) subcutaneously every 24 hours.      oxyCODONE (ROXICODONE) 5 MG tablet Take 1 tablet (5 mg) by mouth every 4 hours as needed for moderate to severe pain. 20 tablet 0    pantoprazole (PROTONIX) 40 MG EC tablet Take 1 tablet (40 mg) by mouth 2 times daily 60 tablet 0    senna-docusate (SENOKOT-S/PERICOLACE) 8.6-50 MG tablet Take 1 tablet by mouth 2 times daily as needed for constipation. 30 tablet 0    sucralfate (CARAFATE) 1 GM tablet Take 1 g by mouth 4 times daily.      tamoxifen (NOLVADEX) 20 MG tablet Take 20 mg by mouth daily.      [Paused] telmisartan (MICARDIS) 80 MG tablet TAKE 1 TABLET(80 MG) BY MOUTH DAILY 90 tablet 3     No current facility-administered medications for this visit.       PHYSICAL EXAM  /54   Pulse 91   Temp 98.2  F (36.8  C)   Resp 18   Ht 1.753 m (5' 9\")   Wt 93 kg (205 lb)   LMP 08/05/2012   SpO2 95%   BMI 30.27 kg/m    Constitutional: Resting in bed, NAD  HEENT: Sclera white, MMM  Respiratory: Breathing non-labored. Lungs CTAB - no wheezes, crackles, or rhonchi  Cardiovascular: Heart RRR, no m/r/g. 2-3+ LLE edema  GI: +BS, abd soft/NT  Skin/Integument: No rash  Musculoskeletal: Normal muscle bulk and tone  Neuro: Alert and appropriate, LÓPEZ  Psych: Calm and cooperative    LAB/IMAGING DATA:  Reviewed in Epic and/or [facility]  Most Recent 3 CBC's:  Recent Labs   Lab Test 05/19/25  0545 05/18/25  0727 " 05/17/25  0755   WBC 5.4 5.2 5.2   HGB 8.1* 7.5* 7.5*  7.5*   MCV 89 89 89  89    201 169     Most Recent 3 BMP's:  Recent Labs   Lab Test 05/19/25  0545 05/18/25  0727 05/16/25  0834 05/16/25  0546 05/16/25  0543 05/15/25  1106 05/15/25  0609 05/14/25  0751   NA  --  139  --   --   --  135  --  137   POTASSIUM 3.7 3.6 3.8   < >  --  3.7  --  3.5   CHLORIDE  --  107  --   --   --  105  --  103   CO2  --  22  --   --   --  20*  --  21*   BUN  --  6.8*  --   --   --  11.7  --  5.3*   CR  --  0.50*  --   --   --  0.77  --  0.44*   ANIONGAP  --  10  --   --   --  10  --  13   MATTEO  --  8.5*  --   --   --  8.2*  --  8.6*   GLC  --  94  --   --  126* 108*   < > 109*    < > = values in this interval not displayed.         ASSESSMENT/PLAN:    Acute left intertrochanteric femur fracture, mildly displaced  s/p left intramedullary nail fixation on 5/14/25  Fall, subsequent encounter  Osteoporosis   Hx of prior left distal femur fracture with retained hardware  *L hip XRay 5/12/25 showed mildly displaced left intertrochanteric hip fracture. Ortho consulted. Underwent IM nail fixation  - current pain regimen: APAP 650 mg q8h prn, oxycodone 5 mg q4h prn  - bowel regimen: senna-docusate #1 BID; hold for loose stools. Miralax daily prn   - VTE ppx: enoxaparin x6 weeks (~6/25/25)  - WBAT LLE  - PT/OT consulted  -  following for discharge planning  - follow up with Ortho 2 weeks post-op    Anemia, multifactorial  Acute blood loss anemia  Iron deficiency anemia  PUD, hx of esophageal ulcers  * mL intra-op. Chronic anemia due to CONRADO in setting of esophageal ulcers, hx of low B!2. B12 962, folate 33 inpatient. Surveillance EGDs (as recent as 2025) and 2022 colonoscopy negative for malignancy  *Heme/Onc consulted inpatient. Anemia thought multifactorial due to surgical blood loss and poor iron absorption in setting of chronic PPI and sucralfate use. Treated with IV iron inpatient with plan to monitor and continue IV iron  prn with primary oncologist, Dr. 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     Chronic LLE ulcer  Present on admission. Due to lymphedema and chronic ulceration. WOCN consulted inpatient  - cont wound care as ordered     Breast cancer  *Follows with Dr. Galvan at [facility]  *Right breast Invasive Ductal Carcinoma ER positive, [place] negative, HER2 negative   Stage IA (pT1b, pN0(sn), cM0, G2, Oncotype DX score: 26.   *Treated with R lumpectomy (11/2024), radiation (1/2025), and tamoxifen.   - cont PTA tamoxifen       Essential hypertension  PTA telmisartan held inpatient due to soft BPs   - resume telmisartan 80 mg daily, hold for SBP<110  - BMP on 5/23/25     Alcohol use disorder  Reports drinking 2-3 beers daily. No s/sx of withdrawal noted since hospital admission     Orders:   Start telmisartan 80 mg daily, hold for SBP<110  Change senna-docusate to #1 BID, hold for loose stools  Miralax daily prn constipation  Hgb 5/23/25; dx: anemia    Electronically signed by:  Cathy Davis MD

## 2025-05-20 NOTE — LETTER
[facility]   To:   Marga IBANEZ SW          Please give to facility    From:   Rosa Elena Francisco  [facility]  Care Coordinator   [facility]   P: [phone]   [email or web address]   Patient Name:  Vandana Gonzalez YOB: 1960   Admit date: 5/19/25      *Information Needed:  Please contact me when the patient will discharge (or if they will move to long term care)- include the discharge date, disposition, and main diagnosis   If the patient is discharged with home care services, please provide the name of the agency    Also- Please inform me if a care conference is being held.   Phone, Fax or Email with information                              Thank you          Electronically signed

## 2025-05-20 NOTE — LETTER
Shantel [facility]  [id number]  1960      PROVIDER ORDERS    Start telmisartan 80 mg daily, hold for SBP<110  Change senna-docusate to #1 BID, hold for loose stools  Miralax daily prn constipation  Hgb 5/23/25; dx: anemia    Cathy Davis MD  5/20/2025, 10:41 AM          Electronically signed

## 2025-05-21 LAB
GAMMA INTERFERON BACKGROUND BLD IA-ACNC: 0.02 IU/ML
M TB IFN-G BLD-IMP: NEGATIVE
M TB IFN-G CD4+ BCKGRND COR BLD-ACNC: 1.48 IU/ML
MITOGEN IGNF BCKGRD COR BLD-ACNC: 0 IU/ML
MITOGEN IGNF BCKGRD COR BLD-ACNC: 0 IU/ML
QUANTIFERON MITOGEN: 1.5 IU/ML
QUANTIFERON NIL TUBE: 0.02 IU/ML
QUANTIFERON TB1 TUBE: 0.02 IU/ML
QUANTIFERON TB2 TUBE: 0.02

## 2025-05-22 DIAGNOSIS — S72.142A CLOSED DISPLACED INTERTROCHANTERIC FRACTURE OF LEFT FEMUR, INITIAL ENCOUNTER (H): ICD-10-CM

## 2025-05-22 RX ORDER — OXYCODONE HYDROCHLORIDE 5 MG/1
5 TABLET ORAL EVERY 4 HOURS PRN
Qty: 30 TABLET | Refills: 0 | Status: SHIPPED | OUTPATIENT
Start: 2025-05-22

## 2025-05-22 NOTE — TELEPHONE ENCOUNTER
Orders Placed This Encounter   Medications    oxyCODONE (ROXICODONE) 5 MG tablet     Sig: Take 1 tablet (5 mg) by mouth every 4 hours as needed for moderate to severe pain.     Dispense:  30 tablet     Refill:  0     NIMCO Lobato CNP

## 2025-05-23 ENCOUNTER — RESULTS FOLLOW-UP (OUTPATIENT)
Dept: GERIATRICS | Facility: CLINIC | Age: 65
End: 2025-05-23

## 2025-05-27 ENCOUNTER — TRANSITIONAL CARE UNIT VISIT (OUTPATIENT)
Dept: GERIATRICS | Facility: CLINIC | Age: 65
End: 2025-05-27
Payer: COMMERCIAL

## 2025-05-27 VITALS
WEIGHT: 209.8 LBS | BODY MASS INDEX: 31.07 KG/M2 | TEMPERATURE: 97.9 F | SYSTOLIC BLOOD PRESSURE: 101 MMHG | OXYGEN SATURATION: 97 % | DIASTOLIC BLOOD PRESSURE: 64 MMHG | HEART RATE: 76 BPM | RESPIRATION RATE: 16 BRPM | HEIGHT: 69 IN
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
## 2025-05-27 DIAGNOSIS — I10 ESSENTIAL HYPERTENSION: ICD-10-CM

## 2025-05-27 DIAGNOSIS — M81.0 AGE-RELATED OSTEOPOROSIS WITHOUT CURRENT PATHOLOGICAL FRACTURE: ICD-10-CM

## 2025-05-27 DIAGNOSIS — G89.18 ACUTE POST-OPERATIVE PAIN: Primary | ICD-10-CM

## 2025-05-27 DIAGNOSIS — S72.142D CLOSED DISPLACED INTERTROCHANTERIC FRACTURE OF LEFT FEMUR WITH ROUTINE HEALING, SUBSEQUENT ENCOUNTER: ICD-10-CM

## 2025-05-27 DIAGNOSIS — L97.909 CHRONIC SKIN ULCER OF LOWER LEG (H): ICD-10-CM

## 2025-05-27 DIAGNOSIS — D62 ANEMIA DUE TO BLOOD LOSS, ACUTE: ICD-10-CM

## 2025-05-27 DIAGNOSIS — D50.8 OTHER IRON DEFICIENCY ANEMIA: ICD-10-CM

## 2025-05-27 DIAGNOSIS — I89.0 LYMPHEDEMA: ICD-10-CM

## 2025-05-27 DIAGNOSIS — K22.10 ULCER OF ESOPHAGUS WITHOUT BLEEDING: ICD-10-CM

## 2025-05-27 DIAGNOSIS — K27.9 PUD (PEPTIC ULCER DISEASE): ICD-10-CM

## 2025-05-27 RX ORDER — OXYCODONE HYDROCHLORIDE 5 MG/1
5-10 TABLET ORAL EVERY 4 HOURS PRN
Status: SHIPPED
Start: 2025-05-27 | End: 2025-05-29

## 2025-05-27 RX ORDER — ACETAMINOPHEN 500 MG
1000 TABLET ORAL 3 TIMES DAILY
COMMUNITY

## 2025-05-27 NOTE — LETTER
Vandana Gonzalez  [id number]  1960      PROVIDER ORDERS      Vnadana Gonzalez  [id number]  1960      PROVIDER ORDERS    Oxycodone 5 mg q4h prn for pain scale 4-7  Oxycodone 10 mg q4h prn for pain scale 8-10    Cathy Davis MD  5/27/2025, 8:32 AM      Electronically signed

## 2025-05-27 NOTE — PROGRESS NOTES
"[facility] GERIATRICS  Episodic care visit note  May 27, 2025       Chief Complaint   Patient presents with    RECHECK       HPI:  Vandana Gonzalez is a 64 year old  (1960) who is being seen today for an episodic care visit at [facility] ([facility]) [[id number]]    Date of TCU admission: 5/19/25 from [facility]  Referred to this facility for: rehab  Patient's prior living condition: Lives with spouse in a house     Summary  64 year old female with PMHx including osteoporosis and breast cancer who was hospitalized at [facility] 5/13-5/19/25 for a left intertrochanteric femur fracture due to fall, and is s/p left IM nail fixation on 5/14/25. Post-op course complicated by anemia (Hgb emily 7.3) with associated low BP. Heme/Onc consulted. Received 1u pRBCs and treated with IV iron. Plan for ongoing IV iron outpatient as per primary oncologist, Dr. Galvan, at [facility]. PTA telmisartan held inpatient. Also noted to have LLE wound due to edema/ulcer. WOC consulted; local wound care initiated. Referred to [facility] for rehab    Today, patient was seen in follow-up. Facility records reviewed. VSS. Weights overall down. Patient was seen at bedside. Reports she had a \"terrible\" weekend with worsening LLE pain. Denies cp/sob. Left hip XR ordered and stable. No new fractures or dislocations. Discussed escalation of pain regimen at TCU vs transfer to ER for pain control. She requests to discharge from U and transfer to [facility] where she typically seeks care. Would need transport arranged. Discussed with bedside nurse and nurse manager    ALLERGIES:    Allergies   Allergen Reactions    Contrast Dye Hives    No Known Allergies         Past Medical, Surgical, Family, and Social History: Reviewed and updated in EPIC    MEDICATIONS  Post Discharge Medication Reconciliation Status: discharge medications reconciled and changed, per note/orders.     Current Outpatient Medications   Medication Sig Dispense Refill    " "acetaminophen (TYLENOL) 500 MG tablet Take 1,000 mg by mouth 3 times daily.      COMPRESSION STOCKINGS For DAILY USE 40-50 mmhg compression. Open toe thigh high 2 each 1    enoxaparin ANTICOAGULANT (LOVENOX) 40 MG/0.4ML syringe Inject 0.4 mLs (40 mg) subcutaneously every 24 hours.      oxyCODONE (ROXICODONE) 5 MG tablet Take 1 tablet (5 mg) by mouth every 4 hours as needed for moderate to severe pain. 30 tablet 0    pantoprazole (PROTONIX) 40 MG EC tablet Take 1 tablet (40 mg) by mouth 2 times daily 60 tablet 0    polyethylene glycol (MIRALAX) 17 GM/Dose powder Take 17 g by mouth daily as needed for constipation.      senna-docusate (SENOKOT-S/PERICOLACE) 8.6-50 MG tablet Take 1 tablet by mouth 2 times daily. Hold for loose stools      sucralfate (CARAFATE) 1 GM tablet Take 1 g by mouth 4 times daily.      tamoxifen (NOLVADEX) 20 MG tablet Take 20 mg by mouth daily.      telmisartan (MICARDIS) 80 MG tablet TAKE 1 TABLET(80 MG) BY MOUTH DAILY 90 tablet 3     Medications reviewed.  Medications reconciled to facility chart and changes were made to reflect current medications as identified as above med list. Below are the changes that were made:   Medications stopped since last EPIC medication reconciliation:   Medications Discontinued During This Encounter   Medication Reason    acetaminophen (TYLENOL) 325 MG tablet Alternate therapy     Medications started since last [facility] medication reconciliation:  Orders Placed This Encounter   Medications    acetaminophen (TYLENOL) 500 MG tablet     Sig: Take 1,000 mg by mouth 3 times daily.    oxyCODONE (ROXICODONE) 5 MG tablet     Sig: Take 1-2 tablets (5-10 mg) by mouth every 4 hours as needed for moderate to severe pain.       REVIEW OF SYSTEMS  4 point ROS completed. Pertinent positives and negatives noted in HPI. All other systems negative      PHYSICAL EXAM  /64   Pulse 76   Temp 97.9  F (36.6  C)   Resp 16   Ht 1.753 m (5' 9\")   Wt 95.2 kg (209 lb 12.8 oz)   LMP " 08/05/2012   SpO2 97%   BMI 30.98 kg/m     Constitutional: Resting in bed, NAD  HEENT: Sclera white, MMM  Respiratory: Breathing non-labored. Lungs CTAB - no wheezes, crackles, or rhonchi  Cardiovascular: Heart RRR, no m/r/g. 3+ LLE edema  GI: +BS, abd soft/NT  Skin/Integument: No rash  Musculoskeletal: Normal muscle bulk and tone  Neuro: Alert and appropriate, LÓPEZ  Psych: Calm and cooperative    LAB/IMAGING DATA  Reviewed in [facility] and/or [facility]  Most Recent 3 CBC's:  Recent Labs   Lab Test 05/23/25  0535 05/19/25  0545 05/18/25  0727 05/17/25  0755   WBC  --  5.4 5.2 5.2   HGB 7.8* 8.1* 7.5* 7.5*  7.5*   MCV 91 89 89 89  89   PLT  --  225 201 169     Most Recent 3 BMP's:  Recent Labs   Lab Test 05/19/25  0545 05/18/25  0727 05/16/25  0834 05/16/25  0546 05/16/25  0543 05/15/25  1106 05/15/25  0609 05/14/25  0751   NA  --  139  --   --   --  135  --  137   POTASSIUM 3.7 3.6 3.8   < >  --  3.7  --  3.5   CHLORIDE  --  107  --   --   --  105  --  103   CO2  --  22  --   --   --  20*  --  21*   BUN  --  6.8*  --   --   --  11.7  --  5.3*   CR  --  0.50*  --   --   --  0.77  --  0.44*   ANIONGAP  --  10  --   --   --  10  --  13   MATTEO  --  8.5*  --   --   --  8.2*  --  8.6*   GLC  --  94  --   --  126* 108*   < > 109*    < > = values in this interval not displayed.     Left hip x-ray: stable alignment; negative for fractures or dislocations      ASSESSMENT/PLAN:    Acute post-operative pain  Acute left intertrochanteric femur fracture, mildly displaced  s/p left intramedullary nail fixation on 5/14/25  Fall, subsequent encounter  Osteoporosis   Hx of prior left distal femur fracture with retained hardware  *L hip XRay 5/12/25 showed mildly displaced left intertrochanteric hip fracture. Ortho consulted. Underwent IM nail fixation. Repeat left hip XR at TCU stable.   - patient requesting transfer to [facility] for ongoing management of LLE pain.   - current pain regimen: APAP 1000 mg TID, oxycodone 5-10 mg q4h  prn  - bowel regimen: senna-docusate #1 BID; hold for loose stools. Miralax daily prn   - VTE ppx: enoxaparin x6 weeks (~6/25/25)  - WBAT LLE  - cont PT/OT   - SW following for discharge planning  - follow up with Ortho 2 weeks post-op     Anemia, multifactorial  Acute blood loss anemia  Iron deficiency anemia  PUD, hx of esophageal ulcers  * mL intra-op. Chronic anemia due to CONRADO in setting of esophageal ulcers, hx of low B!2. B12 962, folate 33 inpatient. Surveillance EGDs (as recent as 2025) and 2022 colonoscopy negative for malignancy  *Heme/Onc consulted inpatient. Anemia thought multifactorial due to surgical blood loss and poor iron absorption in setting of chronic PPI and sucralfate use. Treated with IV iron inpatient with plan to monitor and continue IV iron prn with primary oncologist, Dr. 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      Chronic LLE ulcer  Present on admission. Due to lymphedema and chronic ulceration. WOCN consulted inpatient  - cont wound care as ordered     Breast cancer  *Follows with Dr. Galvan at [facility]  *Right breast Invasive Ductal Carcinoma ER positive, [place] negative, HER2 negative   Stage IA (pT1b, pN0(sn), cM0, G2, Oncotype DX score: 26.   *Treated with R lumpectomy (11/2024), radiation (1/2025), and tamoxifen.   - cont PTA tamoxifen       Essential hypertension  PTA telmisartan held inpatient due to soft BPs   - conts on telmisartan 80 mg daily, hold for SBP<110     Alcohol use disorder  Reports drinking 2-3 beers daily. No s/sx of withdrawal noted since hospital admission     Orders:  Added oxycodone 10 mg q4h prn    Electronically signed by: Cathy Santana  MD Ryan

## 2025-05-29 ENCOUNTER — TRANSITIONAL CARE UNIT VISIT (OUTPATIENT)
Dept: GERIATRICS | Facility: CLINIC | Age: 65
End: 2025-05-29
Payer: COMMERCIAL

## 2025-05-29 VITALS
WEIGHT: 209.8 LBS | BODY MASS INDEX: 30.03 KG/M2 | SYSTOLIC BLOOD PRESSURE: 155 MMHG | TEMPERATURE: 98.5 F | RESPIRATION RATE: 18 BRPM | HEIGHT: 70 IN | OXYGEN SATURATION: 99 % | DIASTOLIC BLOOD PRESSURE: 77 MMHG | HEART RATE: 83 BPM

## 2025-05-29 DIAGNOSIS — M81.0 AGE-RELATED OSTEOPOROSIS WITHOUT CURRENT PATHOLOGICAL FRACTURE: ICD-10-CM

## 2025-05-29 DIAGNOSIS — G89.18 ACUTE POST-OPERATIVE PAIN: Primary | ICD-10-CM

## 2025-05-29 DIAGNOSIS — D62 ANEMIA DUE TO BLOOD LOSS, ACUTE: ICD-10-CM

## 2025-05-29 DIAGNOSIS — Z17.0 MALIGNANT NEOPLASM OF RIGHT BREAST IN FEMALE, ESTROGEN RECEPTOR POSITIVE, UNSPECIFIED SITE OF BREAST (H): ICD-10-CM

## 2025-05-29 DIAGNOSIS — L97.909 CHRONIC SKIN ULCER OF LOWER LEG (H): ICD-10-CM

## 2025-05-29 DIAGNOSIS — K27.9 PUD (PEPTIC ULCER DISEASE): ICD-10-CM

## 2025-05-29 DIAGNOSIS — I10 ESSENTIAL HYPERTENSION: ICD-10-CM

## 2025-05-29 DIAGNOSIS — C50.911 MALIGNANT NEOPLASM OF RIGHT BREAST IN FEMALE, ESTROGEN RECEPTOR POSITIVE, UNSPECIFIED SITE OF BREAST (H): ICD-10-CM

## 2025-05-29 DIAGNOSIS — S72.142D CLOSED DISPLACED INTERTROCHANTERIC FRACTURE OF LEFT FEMUR WITH ROUTINE HEALING, SUBSEQUENT ENCOUNTER: ICD-10-CM

## 2025-05-29 DIAGNOSIS — Q87.2 KLIPPEL-TRENAUNAY SYNDROME: ICD-10-CM

## 2025-05-29 DIAGNOSIS — D50.8 OTHER IRON DEFICIENCY ANEMIA: ICD-10-CM

## 2025-05-29 DIAGNOSIS — I89.0 LYMPHEDEMA: ICD-10-CM
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
## 2025-05-29 PROCEDURE — 99309 SBSQ NF CARE MODERATE MDM 30: CPT | Performed by: NURSE PRACTITIONER

## 2025-05-29 RX ORDER — OXYCODONE HYDROCHLORIDE 5 MG/1
5-10 TABLET ORAL EVERY 4 HOURS PRN
Qty: 30 TABLET | Refills: 0 | Status: SHIPPED | OUTPATIENT
Start: 2025-05-29

## 2025-05-29 NOTE — PROGRESS NOTES
"[facility] GERIATRICS    Chief Complaint   Patient presents with    RECHECK     HPI:  Vandana Gonzalez is a 64 year old  (1960), who is being seen today for an episodic care visit at: [facility] (TCU) [[id number]].     Per recent TCU provider progress notes:   64 year old female with PMHx including osteoporosis and breast cancer hospitalized for a left intertrochanteric femur fracture due to fall, and is s/p left IM nail fixation on 5/14/25. Post-op course complicated by anemia (Hgb emily 7.3) with associated low BP. Heme/Onc consulted. Received 1u pRBCs and treated with IV iron. Plan for ongoing IV iron outpatient as per primary oncologist, Dr. Galvan, at [facility]. PTA telmisartan held inpatient. Also noted to have LLE wound due to edema/ulcer. WOC consulted; local wound care initiated. Referred to TCU for rehab    Today's concern is: ***    Allergies, and PMH/PSH reviewed in [facility] today.  REVIEW OF SYSTEMS:  {yvawvs12:401383}    Objective:   BP (!) 155/77   Pulse 83   Temp 98.5  F (36.9  C)   Resp 18   Ht 1.778 m (5' 10\")   Wt 95.2 kg (209 lb 12.8 oz)   LMP 08/05/2012   SpO2 99%   BMI 30.10 kg/m    {Nursing home physical exam :373876}    Most Recent 3 CBC's:  Recent Labs   Lab Test 05/23/25  0535 05/19/25  0545 05/18/25  0727 05/17/25  0755   WBC  --  5.4 5.2 5.2   HGB 7.8* 8.1* 7.5* 7.5*  7.5*   MCV 91 89 89 89  89   PLT  --  225 201 169     Most Recent 3 BMP's:  Recent Labs   Lab Test 05/19/25  0545 05/18/25  0727 05/16/25  0834 05/16/25  0546 05/16/25  0543 05/15/25  1106 05/15/25  0609 05/14/25  0751   NA  --  139  --   --   --  135  --  137   POTASSIUM 3.7 3.6 3.8   < >  --  3.7  --  3.5   CHLORIDE  --  107  --   --   --  105  --  103   CO2  --  22  --   --   --  20*  --  21*   BUN  --  6.8*  --   --   --  11.7  --  5.3*   CR  --  0.50*  --   --   --  0.77  --  0.44*   ANIONGAP  --  10  --   --   --  10  --  13   MATTEO  --  8.5*  --   --   --  8.2*  --  8.6*   GLC  --  94  --   --  126* " 108*   < > 109*    < > = values in this interval not displayed.       Assessment/Plan:    Acute post-operative pain  Acute left intertrochanteric femur fracture, mildly displaced  s/p left intramedullary nail fixation on 5/14/25  Fall, subsequent encounter  Osteoporosis   Hx of prior left distal femur fracture with retained hardware  *L hip XRay 5/12/25 showed mildly displaced left intertrochanteric hip fracture. Ortho consulted. Underwent IM nail fixation. Repeat left hip XR at TCU stable.   - patient requesting transfer to [facility] for ongoing management of LLE pain.   - current pain regimen: APAP 1000 mg TID, oxycodone 5-10 mg q4h prn  - bowel regimen: senna-docusate #1 BID; hold for loose stools. Miralax daily prn   - VTE ppx: enoxaparin x6 weeks (~6/25/25)  - WBAT LLE  - cont PT/OT   - SW following for discharge planning  - follow up with Ortho 2 weeks post-op     Anemia, multifactorial  Acute blood loss anemia  Iron deficiency anemia  PUD, hx of esophageal ulcers  * mL intra-op. Chronic anemia due to CONRADO in setting of esophageal ulcers, hx of low B!2. B12 962, folate 33 inpatient. Surveillance EGDs (as recent as 2025) and 2022 colonoscopy negative for malignancy  *Heme/Onc consulted inpatient. Anemia thought multifactorial due to surgical blood loss and poor iron absorption in setting of chronic PPI and sucralfate use. Treated with IV iron inpatient with plan to monitor and continue IV iron prn with primary oncologist, Dr. 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      Chronic LLE ulcer  Present on admission. Due to lymphedema and chronic ulceration. WOCN consulted inpatient  - cont  wound care as ordered     Breast cancer  *Follows with Dr. Galvan at [facility]  *Right breast Invasive Ductal Carcinoma ER positive, [place] negative, HER2 negative   Stage IA (pT1b, pN0(sn), cM0, G2, Oncotype DX score: 26.   *Treated with R lumpectomy (11/2024), radiation (1/2025), and tamoxifen.   - cont PTA tamoxifen       Essential hypertension  PTA telmisartan held inpatient due to soft BPs   - conts on telmisartan 80 mg daily, hold for SBP<110     Alcohol use disorder  Reports drinking 2-3 beers daily. No s/sx of withdrawal noted since hospital admission     MED REC REQUIRED{TIP  Click the link below to document or use med rec list, use list to pull in response :[id number]}  Post Medication Reconciliation Status: {MED REC LIST:617304}      Orders:  CBC on 5/30 diagnosis anemia    Electronically signed by: NIMCO Haque CNP ***

## 2025-06-04 ENCOUNTER — TRANSITIONAL CARE UNIT VISIT (OUTPATIENT)
Dept: GERIATRICS | Facility: CLINIC | Age: 65
End: 2025-06-04
Payer: COMMERCIAL

## 2025-06-04 VITALS
SYSTOLIC BLOOD PRESSURE: 144 MMHG | OXYGEN SATURATION: 96 % | WEIGHT: 206.2 LBS | HEIGHT: 70 IN | HEART RATE: 74 BPM | BODY MASS INDEX: 29.52 KG/M2 | TEMPERATURE: 97.6 F | RESPIRATION RATE: 16 BRPM | DIASTOLIC BLOOD PRESSURE: 70 MMHG

## 2025-06-04 DIAGNOSIS — D50.8 OTHER IRON DEFICIENCY ANEMIA: ICD-10-CM

## 2025-06-04 DIAGNOSIS — Q87.2 KLIPPEL-TRENAUNAY SYNDROME: ICD-10-CM

## 2025-06-04 DIAGNOSIS — G89.18 ACUTE POST-OPERATIVE PAIN: Primary | ICD-10-CM

## 2025-06-04 DIAGNOSIS — D62 ANEMIA DUE TO BLOOD LOSS, ACUTE: ICD-10-CM

## 2025-06-04 DIAGNOSIS — L97.909 CHRONIC SKIN ULCER OF LOWER LEG (H): ICD-10-CM

## 2025-06-04 DIAGNOSIS — Z17.0 MALIGNANT NEOPLASM OF RIGHT BREAST IN FEMALE, ESTROGEN RECEPTOR POSITIVE, UNSPECIFIED SITE OF BREAST (H): ICD-10-CM

## 2025-06-04 DIAGNOSIS — M81.0 AGE-RELATED OSTEOPOROSIS WITHOUT CURRENT PATHOLOGICAL FRACTURE: ICD-10-CM

## 2025-06-04 DIAGNOSIS — I10 ESSENTIAL HYPERTENSION: ICD-10-CM

## 2025-06-04 DIAGNOSIS — I89.0 LYMPHEDEMA: ICD-10-CM

## 2025-06-04 DIAGNOSIS — S72.142D CLOSED DISPLACED INTERTROCHANTERIC FRACTURE OF LEFT FEMUR WITH ROUTINE HEALING, SUBSEQUENT ENCOUNTER: ICD-10-CM

## 2025-06-04 DIAGNOSIS — C50.911 MALIGNANT NEOPLASM OF RIGHT BREAST IN FEMALE, ESTROGEN RECEPTOR POSITIVE, UNSPECIFIED SITE OF BREAST (H): ICD-10-CM

## 2025-06-04 NOTE — PROGRESS NOTES
"[facility] GERIATRICS    Chief Complaint   Patient presents with    RECHECK     HPI:  Vandana Gonzalez is a 64 year old  (1960), who is being seen today for an episodic care visit at: [facility] (TCU) [[id number]].     Per recent TCU provider progress notes:   64 year old female with PMHx including osteoporosis and breast cancer hospitalized for a left intertrochanteric femur fracture due to fall, and is s/p left IM nail fixation on 5/14/25. Post-op course complicated by anemia (Hgb emily 7.3) with associated low BP. Heme/Onc consulted. Received 1u pRBCs and treated with IV iron. Plan for ongoing IV iron outpatient as per primary oncologist, Dr. Galvan, at [facility]. PTA telmisartan held inpatient. Also noted to have LLE wound due to edema/ulcer. WOC consulted; local wound care initiated. Referred to TCU for rehab    Today's concern is: episodic follow-up vs, labs, pain, mobility. Ongoing moderate to severe pain, oxycodone effective. Denies headaches, dizziness, chest pain, dyspnea, bowel or bladder issues. Wt down 5 lbs since admission. Sats 97% room air. BP range 108-144/58-70. Walks 12 ft with 2WW. SLUMS 27/30.     Allergies, and PMH/PSH reviewed in [facility] today.  REVIEW OF SYSTEMS:  4 point ROS including Respiratory, CV, GI and , other than that noted in the HPI,  is negative    Objective:   BP (!) 144/70   Pulse 74   Temp 97.6  F (36.4  C)   Resp 16   Ht 1.778 m (5' 10\")   Wt 93.5 kg (206 lb 3.2 oz)   LMP 08/05/2012   SpO2 96%   BMI 29.59 kg/m    GENERAL APPEARANCE:  Alert, in no distress, pleasant, cooperative, oriented x 3  EYES:  EOM, lids, pupils and irises normal, sclera clear and conjunctiva normal, no discharge or mattering on lids or lashes noted  ENT:  Mouth normal, moist mucous membranes, nose normal without drainage or crusting, external ears without lesions, hearing acuity intact  NECK: supple, symmetrical, trachea midline  RESP:  respiratory effort normal, no respiratory " distress, patient is on room air  CV:  chronic edema +2 LLE  M/S:   Gait and station not assessed, no tenderness or swelling of the joints; able to move all extremities, digits normal  NEURO: cranial nerves 2-12 grossly intact, no facial asymmetry, no speech deficits and able to follow directions, moves all extremities symmetrically  PSYCH:  insight and judgement and memory intact, affect and mood normal     Most Recent 3 CBC's:  Recent Labs   Lab Test 05/30/25  0625 05/23/25  0535 05/19/25  0545 05/18/25  0727   WBC 4.3  --  5.4 5.2   HGB 7.9* 7.8* 8.1* 7.5*   MCV 90 91 89 89     --  225 201     Most Recent 3 BMP's:  Recent Labs   Lab Test 05/19/25  0545 05/18/25  0727 05/16/25  0834 05/16/25  0546 05/16/25  0543 05/15/25  1106 05/15/25  0609 05/14/25  0751   NA  --  139  --   --   --  135  --  137   POTASSIUM 3.7 3.6 3.8   < >  --  3.7  --  3.5   CHLORIDE  --  107  --   --   --  105  --  103   CO2  --  22  --   --   --  20*  --  21*   BUN  --  6.8*  --   --   --  11.7  --  5.3*   CR  --  0.50*  --   --   --  0.77  --  0.44*   ANIONGAP  --  10  --   --   --  10  --  13   MATTEO  --  8.5*  --   --   --  8.2*  --  8.6*   GLC  --  94  --   --  126* 108*   < > 109*    < > = values in this interval not displayed.       Assessment/Plan:    Acute post-operative pain  Acute left intertrochanteric femur fracture, mildly displaced s/p left intramedullary nail fixation on 5/14/25  Osteoporosis   Acute injury, s/p repair. Continue tylenol, PRN oxycodone.  WBAT LLE. Therapies eval and treat. Follow-up ortho as planned.      Anemia, multifactorial  Acute blood loss anemia  Iron deficiency anemia  Acute on chronic. Recent IV iron. On PPI and Carafate. Hgb 7.8 on 5/23. Checked CBC 5/30 and Hgb 7.9.       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       Chronic LLE ulcer  Care per [facility] orders.      Breast cancer  Treated with R lumpectomy (11/2024), radiation (1/2025), and tamoxifen. Oncology follow-up per home routine.      Essential hypertension  Chronic. Continue telmisartan 80 mg daily, hold for SBP<110     MED REC REQUIRED  Post Medication Reconciliation Status: discharge medications reconciled, continue medications without change    Orders:  NNO    Electronically signed by: NIMCO Haque CNP

## 2025-06-06 ENCOUNTER — TRANSITIONAL CARE UNIT VISIT (OUTPATIENT)
Dept: GERIATRICS | Facility: CLINIC | Age: 65
End: 2025-06-06
Payer: COMMERCIAL

## 2025-06-06 VITALS
WEIGHT: 204.6 LBS | TEMPERATURE: 98.2 F | OXYGEN SATURATION: 95 % | HEART RATE: 78 BPM | DIASTOLIC BLOOD PRESSURE: 89 MMHG | BODY MASS INDEX: 29.29 KG/M2 | HEIGHT: 70 IN | SYSTOLIC BLOOD PRESSURE: 151 MMHG | RESPIRATION RATE: 18 BRPM

## 2025-06-06 DIAGNOSIS — Z17.0 MALIGNANT NEOPLASM OF RIGHT BREAST IN FEMALE, ESTROGEN RECEPTOR POSITIVE, UNSPECIFIED SITE OF BREAST (H): ICD-10-CM

## 2025-06-06 DIAGNOSIS — D50.8 OTHER IRON DEFICIENCY ANEMIA: ICD-10-CM

## 2025-06-06 DIAGNOSIS — G89.18 ACUTE POST-OPERATIVE PAIN: Primary | ICD-10-CM

## 2025-06-06 DIAGNOSIS — D62 ANEMIA DUE TO BLOOD LOSS, ACUTE: ICD-10-CM

## 2025-06-06 DIAGNOSIS — I10 ESSENTIAL HYPERTENSION: ICD-10-CM

## 2025-06-06 DIAGNOSIS — I89.0 LYMPHEDEMA: ICD-10-CM

## 2025-06-06 DIAGNOSIS — L97.909 CHRONIC SKIN ULCER OF LOWER LEG (H): ICD-10-CM

## 2025-06-06 DIAGNOSIS — M81.0 AGE-RELATED OSTEOPOROSIS WITHOUT CURRENT PATHOLOGICAL FRACTURE: ICD-10-CM

## 2025-06-06 DIAGNOSIS — S72.142D CLOSED DISPLACED INTERTROCHANTERIC FRACTURE OF LEFT FEMUR WITH ROUTINE HEALING, SUBSEQUENT ENCOUNTER: ICD-10-CM

## 2025-06-06 DIAGNOSIS — C50.911 MALIGNANT NEOPLASM OF RIGHT BREAST IN FEMALE, ESTROGEN RECEPTOR POSITIVE, UNSPECIFIED SITE OF BREAST (H): ICD-10-CM

## 2025-06-06 DIAGNOSIS — Q87.2 KLIPPEL-TRENAUNAY SYNDROME: ICD-10-CM

## 2025-06-06 PROCEDURE — 99309 SBSQ NF CARE MODERATE MDM 30: CPT | Performed by: NURSE PRACTITIONER

## 2025-06-06 NOTE — PROGRESS NOTES
"[facility] GERIATRICS    Chief Complaint   Patient presents with    RECHECK     HPI:  Vandana Gonzalez is a 64 year old  (1960), who is being seen today for an episodic care visit at: [facility] ([facility]) [[id number]].     Per recent TCU provider progress notes:   64 year old female with PMHx including osteoporosis and breast cancer hospitalized for a left intertrochanteric femur fracture due to fall, and is s/p left IM nail fixation on 5/14/25. Post-op course complicated by anemia (Hgb emily 7.3) with associated low BP. Heme/Onc consulted. Received 1u pRBCs and treated with IV iron. Plan for ongoing IV iron outpatient as per primary oncologist, Dr. Galvan, at [facility]. PTA telmisartan held inpatient. Also noted to have LLE wound due to edema/ulcer. WOC consulted; local wound care initiated. Referred to TCU for rehab    Today's concern is: episodic follow-up vs, labs, pain, mobility. Pain is improving. Added robaxin due to spasms, effective when used. Denies headaches, dizziness, chest pain, dyspnea, bowel or bladder issues. Wt down 5 lbs since admission. Sats 95% room air. BP range 122-159/65-89. Walks 30 ft with 2WW. SLUMS 27/30.     Allergies, and PMH/PSH reviewed in [facility] today.  REVIEW OF SYSTEMS:  4 point ROS including Respiratory, CV, GI and , other than that noted in the HPI,  is negative    Objective:   BP (!) 151/89   Pulse 78   Temp 98.2  F (36.8  C)   Resp 18   Ht 1.778 m (5' 10\")   Wt 92.8 kg (204 lb 9.6 oz)   LMP 08/05/2012   SpO2 95%   BMI 29.36 kg/m    GENERAL APPEARANCE:  Alert, in no distress, pleasant, cooperative, oriented x 3  EYES:  EOM, lids, pupils and irises normal, sclera clear and conjunctiva normal, no discharge or mattering on lids or lashes noted  ENT:  Mouth normal, moist mucous membranes, nose normal without drainage or crusting, external ears without lesions, hearing acuity intact  RESP:  respiratory effort normal, no respiratory distress, patient is on room air  CV: "  chronic edema +2 LLE  M/S:   Gait and station not assessed, no tenderness or swelling of the joints; able to move all extremities, digits normal  NEURO: cranial nerves 2-12 grossly intact, no facial asymmetry, no speech deficits and able to follow directions, moves all extremities symmetrically  PSYCH:  insight and judgement and memory intact, affect and mood normal     Most Recent 3 CBC's:  Recent Labs   Lab Test 05/30/25  0625 05/23/25  0535 05/19/25  0545 05/18/25  0727   WBC 4.3  --  5.4 5.2   HGB 7.9* 7.8* 8.1* 7.5*   MCV 90 91 89 89     --  225 201     Most Recent 3 BMP's:  Recent Labs   Lab Test 05/19/25  0545 05/18/25  0727 05/16/25  0834 05/16/25  0546 05/16/25  0543 05/15/25  1106 05/15/25  0609 05/14/25  0751   NA  --  139  --   --   --  135  --  137   POTASSIUM 3.7 3.6 3.8   < >  --  3.7  --  3.5   CHLORIDE  --  107  --   --   --  105  --  103   CO2  --  22  --   --   --  20*  --  21*   BUN  --  6.8*  --   --   --  11.7  --  5.3*   CR  --  0.50*  --   --   --  0.77  --  0.44*   ANIONGAP  --  10  --   --   --  10  --  13   MATTEO  --  8.5*  --   --   --  8.2*  --  8.6*   GLC  --  94  --   --  126* 108*   < > 109*    < > = values in this interval not displayed.       Assessment/Plan:    Acute post-operative pain  Acute left intertrochanteric femur fracture, mildly displaced s/p left intramedullary nail fixation on 5/14/25  Osteoporosis   Acute injury, s/p repair. Pain is improving. Continue tylenol, PRN oxycodone.  Due to spasms added PRN Robaxin. WBAT LLE. Therapies eval and treat. Follow-up ortho as planned.      Anemia, multifactorial  Acute blood loss anemia  Iron deficiency anemia  Acute on chronic. Recent IV iron. On PPI and Carafate. Hgb 7.8 on 5/23. Checked CBC 5/30 and Hgb 7.9. Monitor PRN.       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      Chronic LLE ulcer  Care per [facility] orders.      Breast cancer  Treated with R lumpectomy (11/2024), radiation (1/2025), and tamoxifen. Oncology follow-up per home routine.      Essential hypertension  Chronic. Continue telmisartan 80 mg daily, hold for SBP<110     MED REC REQUIRED  Post Medication Reconciliation Status: discharge medications reconciled, continue medications without change    Orders:  Added Robaxin 750 mg PO QID PRN spasms    Electronically signed by: NIMCO Haque CNP

## 2025-06-12 NOTE — PROGRESS NOTES
[facility] GERIATRICS    Chief Complaint   Patient presents with    RECHECK     HPI:  Vandana Gonzalez is a 64 year old  (1960), who is being seen today for an episodic care visit at: No question data found.. Today's concern is: ***    Allergies, and PMH/PSH reviewed in [facility] today.  REVIEW OF SYSTEMS:  {nrjhvk24:329704}    Objective:   LMP 08/05/2012   {Nursing home physical exam :571167}    {fgslab:099178}    Assessment/Plan:  {S DX2:999501}    MED REC REQUIRED{TIP  Click the link below to document or use med rec list, use list to pull in response :623805}  Post Medication Reconciliation Status: {MED REC LIST:817231}      Orders:  {fgsorders:641531}  ***    Electronically signed by: Carrie Barreto ***       all extremities, digits normal  NEURO: cranial nerves 2-12 grossly intact, no facial asymmetry, no speech deficits and able to follow directions, moves all extremities symmetrically  PSYCH:  insight and judgement and memory intact, affect and mood normal     Most Recent 3 CBC's:  Recent Labs   Lab Test 05/30/25  0625 05/23/25  0535 05/19/25  0545 05/18/25  0727   WBC 4.3  --  5.4 5.2   HGB 7.9* 7.8* 8.1* 7.5*   MCV 90 91 89 89     --  225 201     Most Recent 3 BMP's:  Recent Labs   Lab Test 05/19/25  0545 05/18/25  0727 05/16/25  0834 05/16/25  0546 05/16/25  0543 05/15/25  1106 05/15/25  0609 05/14/25  0751   NA  --  139  --   --   --  135  --  137   POTASSIUM 3.7 3.6 3.8   < >  --  3.7  --  3.5   CHLORIDE  --  107  --   --   --  105  --  103   CO2  --  22  --   --   --  20*  --  21*   BUN  --  6.8*  --   --   --  11.7  --  5.3*   CR  --  0.50*  --   --   --  0.77  --  0.44*   ANIONGAP  --  10  --   --   --  10  --  13   MATTEO  --  8.5*  --   --   --  8.2*  --  8.6*   GLC  --  94  --   --  126* 108*   < > 109*    < > = values in this interval not displayed.       Assessment/Plan:    Acute post-operative pain  Acute left intertrochanteric femur fracture, mildly displaced s/p left intramedullary nail fixation on 5/14/25  Osteoporosis   Acute injury, s/p repair. Pain continues to improve. Continue tylenol, PRN oxycodone.  Due to spasms added PRN Robaxin. WBAT LLE. Therapies eval and treat. Follow-up ortho as planned.      Acute blood loss anemia  Iron deficiency anemia  Acute on chronic. Recent IV iron. On PPI and Carafate. Hgb 7.8 on 5/23. Checked CBC 5/30 and Hgb 7.9. Monitor PRN.       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      Chronic LLE ulcer  Care per [facility] orders.      Breast cancer  Treated with R lumpectomy (11/2024),  radiation (1/2025), and tamoxifen. Oncology follow-up per home routine.      Essential hypertension  Chronic. Continue telmisartan 80 mg daily, hold for SBP<110     MED REC REQUIRED  Post Medication Reconciliation Status: discharge medications reconciled, continue medications without change    Orders  NNO    Electronically signed by: NIMCO Haque CNP

## 2025-06-13 ENCOUNTER — TRANSITIONAL CARE UNIT VISIT (OUTPATIENT)
Dept: GERIATRICS | Facility: CLINIC | Age: 65
End: 2025-06-13
Payer: COMMERCIAL

## 2025-06-13 VITALS
RESPIRATION RATE: 16 BRPM | HEIGHT: 70 IN | OXYGEN SATURATION: 95 % | SYSTOLIC BLOOD PRESSURE: 135 MMHG | TEMPERATURE: 97.2 F | HEART RATE: 73 BPM | BODY MASS INDEX: 27.89 KG/M2 | DIASTOLIC BLOOD PRESSURE: 72 MMHG | WEIGHT: 194.8 LBS

## 2025-06-13 DIAGNOSIS — Q87.2 KLIPPEL-TRENAUNAY SYNDROME: ICD-10-CM

## 2025-06-13 DIAGNOSIS — D50.8 OTHER IRON DEFICIENCY ANEMIA: ICD-10-CM

## 2025-06-13 DIAGNOSIS — I89.0 LYMPHEDEMA: ICD-10-CM

## 2025-06-13 DIAGNOSIS — D62 ANEMIA DUE TO BLOOD LOSS, ACUTE: ICD-10-CM

## 2025-06-13 DIAGNOSIS — Z17.0 MALIGNANT NEOPLASM OF RIGHT BREAST IN FEMALE, ESTROGEN RECEPTOR POSITIVE, UNSPECIFIED SITE OF BREAST (H): ICD-10-CM

## 2025-06-13 DIAGNOSIS — I10 ESSENTIAL HYPERTENSION: ICD-10-CM

## 2025-06-13 DIAGNOSIS — C50.911 MALIGNANT NEOPLASM OF RIGHT BREAST IN FEMALE, ESTROGEN RECEPTOR POSITIVE, UNSPECIFIED SITE OF BREAST (H): ICD-10-CM

## 2025-06-13 DIAGNOSIS — S72.142D CLOSED DISPLACED INTERTROCHANTERIC FRACTURE OF LEFT FEMUR WITH ROUTINE HEALING, SUBSEQUENT ENCOUNTER: ICD-10-CM

## 2025-06-13 DIAGNOSIS — L97.909 CHRONIC SKIN ULCER OF LOWER LEG (H): ICD-10-CM

## 2025-06-13 DIAGNOSIS — G89.18 ACUTE POST-OPERATIVE PAIN: Primary | ICD-10-CM

## 2025-06-13 DIAGNOSIS — M81.0 AGE-RELATED OSTEOPOROSIS WITHOUT CURRENT PATHOLOGICAL FRACTURE: ICD-10-CM

## 2025-06-13 PROCEDURE — 99309 SBSQ NF CARE MODERATE MDM 30: CPT | Performed by: NURSE PRACTITIONER

## 2025-06-16 DIAGNOSIS — G89.18 ACUTE POST-OPERATIVE PAIN: ICD-10-CM

## 2025-06-16 RX ORDER — OXYCODONE HYDROCHLORIDE 5 MG/1
5-10 TABLET ORAL EVERY 4 HOURS PRN
Qty: 30 TABLET | Refills: 0 | Status: SHIPPED | OUTPATIENT
Start: 2025-06-16 | End: 2025-06-17

## 2025-06-17 ENCOUNTER — DISCHARGE SUMMARY NURSING HOME (OUTPATIENT)
Dept: GERIATRICS | Facility: CLINIC | Age: 65
End: 2025-06-17
Payer: COMMERCIAL

## 2025-06-17 VITALS
HEART RATE: 66 BPM | OXYGEN SATURATION: 98 % | HEIGHT: 70 IN | SYSTOLIC BLOOD PRESSURE: 149 MMHG | WEIGHT: 195 LBS | TEMPERATURE: 98.2 F | DIASTOLIC BLOOD PRESSURE: 72 MMHG | RESPIRATION RATE: 18 BRPM | BODY MASS INDEX: 27.92 KG/M2

## 2025-06-17 DIAGNOSIS — L97.909 CHRONIC SKIN ULCER OF LOWER LEG (H): ICD-10-CM

## 2025-06-17 DIAGNOSIS — M81.0 AGE-RELATED OSTEOPOROSIS WITHOUT CURRENT PATHOLOGICAL FRACTURE: ICD-10-CM

## 2025-06-17 DIAGNOSIS — D50.8 OTHER IRON DEFICIENCY ANEMIA: ICD-10-CM

## 2025-06-17 DIAGNOSIS — Q87.2 KLIPPEL-TRENAUNAY SYNDROME: ICD-10-CM

## 2025-06-17 DIAGNOSIS — D62 ANEMIA DUE TO BLOOD LOSS, ACUTE: ICD-10-CM

## 2025-06-17 DIAGNOSIS — S72.142D CLOSED DISPLACED INTERTROCHANTERIC FRACTURE OF LEFT FEMUR WITH ROUTINE HEALING, SUBSEQUENT ENCOUNTER: ICD-10-CM

## 2025-06-17 DIAGNOSIS — Z17.0 MALIGNANT NEOPLASM OF RIGHT BREAST IN FEMALE, ESTROGEN RECEPTOR POSITIVE, UNSPECIFIED SITE OF BREAST (H): ICD-10-CM

## 2025-06-17 DIAGNOSIS — I10 ESSENTIAL HYPERTENSION: ICD-10-CM

## 2025-06-17 DIAGNOSIS — S72.142A CLOSED DISPLACED INTERTROCHANTERIC FRACTURE OF LEFT FEMUR, INITIAL ENCOUNTER (H): ICD-10-CM

## 2025-06-17 DIAGNOSIS — G89.18 ACUTE POST-OPERATIVE PAIN: ICD-10-CM

## 2025-06-17 DIAGNOSIS — G89.18 ACUTE POST-OPERATIVE PAIN: Primary | ICD-10-CM

## 2025-06-17 DIAGNOSIS — I89.0 LYMPHEDEMA: ICD-10-CM

## 2025-06-17 DIAGNOSIS — C50.911 MALIGNANT NEOPLASM OF RIGHT BREAST IN FEMALE, ESTROGEN RECEPTOR POSITIVE, UNSPECIFIED SITE OF BREAST (H): ICD-10-CM

## 2025-06-17 PROCEDURE — 99316 NF DSCHRG MGMT 30 MIN+: CPT | Performed by: NURSE PRACTITIONER

## 2025-06-17 RX ORDER — ENOXAPARIN SODIUM 100 MG/ML
40 INJECTION SUBCUTANEOUS EVERY 24 HOURS
Qty: 4 ML | Refills: 0 | Status: SHIPPED | OUTPATIENT
Start: 2025-06-17

## 2025-06-17 RX ORDER — METHOCARBAMOL 750 MG/1
750 TABLET, FILM COATED ORAL 4 TIMES DAILY PRN
Qty: 30 TABLET | Refills: 0 | Status: SHIPPED | OUTPATIENT
Start: 2025-06-17

## 2025-06-17 RX ORDER — OXYCODONE HYDROCHLORIDE 5 MG/1
5-10 TABLET ORAL EVERY 4 HOURS PRN
Qty: 20 TABLET | Refills: 0 | Status: SHIPPED | OUTPATIENT
Start: 2025-06-17

## 2025-06-17 NOTE — PROGRESS NOTES
[facility] GERIATRICS DISCHARGE SUMMARY  PATIENT'S NAME: Vandana Gonzalez  YOB: 1960  MEDICAL RECORD NUMBER:  [id number]  Place of Service where encounter took place:  [facility] ([facility]) [[id number]]    PRIMARY CARE PROVIDER AND CLINIC RESPONSIBLE AFTER TRANSFER:   Antonio Mccauley MD, [address] / [place] [address]    G Provider     Transferring providers: NIMCO Haque CNP, Dr. Ryan MD  Recent Hospitalization/ED:  [facility] stay 5/13/25 to 5/19/25.  Date of SNF Admission: 5/19/25  Date of SNF (anticipated) Discharge: 6/20/25  Discharged to: previous independent home  Cognitive Scores: SLUMS: 27/30  Physical Function: Ambulating 200 ft with 2WW  DME: No new DME needed    CODE STATUS/ADVANCE DIRECTIVES DISCUSSION:  Prior   ALLERGIES: Contrast dye and No known allergies    NURSING FACILITY COURSE   Medication Changes/Rationale:   Added Robaxin PRN spasms  Scheduled tylenol for pain  Decreased laxatives due to loose stools    Per recent TCU provider progress notes:   64 year old female with PMHx including osteoporosis and breast cancer hospitalized for a left intertrochanteric femur fracture due to fall, and is s/p left IM nail fixation on 5/14/25. Post-op course complicated by anemia (Hgb emily 7.3) with associated low BP. Heme/Onc consulted. Received 1u pRBCs and treated with IV iron. Plan for ongoing IV iron outpatient as per primary oncologist, Dr. Galvan, at [facility]. PTA telmisartan held inpatient. Also noted to have LLE wound due to edema/ulcer. WOC consulted; local wound care initiated. Referred to TCU for rehab    Seen for discharge visit. Pain improving. No dizziness, chest pain, dyspnea, bowel or bladder issues. BP range 106-138/63-75 and sats 95% room air. Home with home care as recommended.    Summary of nursing facility stay:   Acute post-operative pain  Acute left intertrochanteric femur fracture, mildly displaced  s/p left intramedullary nail fixation on 5/14/25  Osteoporosis   Acute injury, s/p repair. Pain continues to improve. Continue tylenol, PRN oxycodone, PRN Robaxin. WBAT LLE. Follow-up ortho as planned. Home with home care.      Acute blood loss anemia  Iron deficiency anemia  Acute on chronic. Recent IV iron. On PPI and Carafate. Hgb 7.8 on 5/23. Checked CBC 5/30 and Hgb 7.9. Monitor PRN.       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      Chronic LLE ulcer  Care per [facility] orders.      Breast cancer  Treated with R lumpectomy (11/2024), radiation (1/2025), and tamoxifen. Oncology follow-up per home routine.      Essential hypertension  Chronic. Continue telmisartan 80 mg daily, hold for SBP<110     Discharge Medications:  MED REC REQUIRED  Post Medication Reconciliation Status: discharge medications reconciled and changed, per note/orders    Current Outpatient Medications   Medication Sig Dispense Refill    acetaminophen (TYLENOL) 500 MG tablet Take 1,000 mg by mouth 3 times daily.      COMPRESSION STOCKINGS For DAILY USE 40-50 mmhg compression. Open toe thigh high 2 each 1    enoxaparin ANTICOAGULANT (LOVENOX) 40 MG/0.4ML syringe Inject 0.4 mLs (40 mg) subcutaneously every 24 hours.      oxyCODONE (ROXICODONE) 5 MG tablet Take 1-2 tablets (5-10 mg) by mouth every 4 hours as needed for moderate to severe pain. 30 tablet 0    pantoprazole (PROTONIX) 40 MG EC tablet Take 1 tablet (40 mg) by mouth 2 times daily 60 tablet 0    polyethylene glycol (MIRALAX) 17 GM/Dose powder Take 17 g by mouth daily as needed for constipation.      senna-docusate (SENOKOT-S/PERICOLACE) 8.6-50 MG tablet Take 1 tablet by mouth 2 times daily. Hold for loose stools      sucralfate (CARAFATE) 1 GM tablet Take 1 g by mouth 4 times daily.      tamoxifen (NOLVADEX) 20 MG  "tablet Take 20 mg by mouth daily.      telmisartan (MICARDIS) 80 MG tablet TAKE 1 TABLET(80 MG) BY MOUTH DAILY 90 tablet 3        Controlled medications:   Oxycodone 5 mg tabs #20 sent to pharmacy, no refills     Past Medical History:   Past Medical History:   Diagnosis Date    Closed fracture of unspecified part of neck of femur 11/2002    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, unspecified site     large angioma adomen    Hypertensive disorder     Klippel Trenaunay disease 2002    AVM left leg    Phlebitis and thrombophlebitis of other deep vessels of lower extremities 06/2002    AVM left leg    Stasis ulcer of lower extremity (H) 2012    Left-recurrent     Physical Exam:   Vitals: BP (!) 149/72   Pulse 66   Temp 98.2  F (36.8  C)   Resp 18   Ht 1.778 m (5' 10\")   Wt 88.5 kg (195 lb)   LMP 08/05/2012   SpO2 98%   BMI 27.98 kg/m    BMI: Body mass index is 27.98 kg/m .  GENERAL APPEARANCE:  Alert, in no distress, pleasant, cooperative  EYES:  EOM, lids, pupils and irises normal, sclera clear and conjunctiva normal, no discharge or mattering on lids or lashes noted  ENT:  Mouth normal, moist mucous membranes, nose normal without drainage or crusting, external ears without lesions, hearing acuity intact  RESP:  respiratory effort normal, no respiratory distress, patient is on room air  CV:  chronic edema +2 LLE, ACE bandages in place  M/S:   walks with walker, able to move all extremities, digits normal  NEURO: cranial nerves 2-12 grossly intact, no facial asymmetry, no speech deficits and able to follow directions, moves all extremities symmetrically  PSYCH:  insight and judgement and memory intact, affect and mood normal     SNF labs: Most Recent 3 CBC's:  Recent Labs   Lab Test 05/30/25  0625 05/23/25  0535 05/19/25  0545 05/18/25  0727   WBC 4.3  --  5.4 5.2   HGB 7.9* 7.8* 8.1* 7.5*   MCV 90 91 89 89     --  225 201     Most Recent 3 BMP's:  Recent Labs   Lab Test 05/19/25  0545 " 05/18/25  0727 05/16/25  0834 05/16/25  0546 05/16/25  0543 05/15/25  1106 05/15/25  0609 05/14/25  0751   NA  --  139  --   --   --  135  --  137   POTASSIUM 3.7 3.6 3.8   < >  --  3.7  --  3.5   CHLORIDE  --  107  --   --   --  105  --  103   CO2  --  22  --   --   --  20*  --  21*   BUN  --  6.8*  --   --   --  11.7  --  5.3*   CR  --  0.50*  --   --   --  0.77  --  0.44*   ANIONGAP  --  10  --   --   --  10  --  13   MATTEO  --  8.5*  --   --   --  8.2*  --  8.6*   GLC  --  94  --   --  126* 108*   < > 109*    < > = values in this interval not displayed.       DISCHARGE PLAN:  Follow up labs: No labs orders/due  Medical Follow Up:      Follow up with primary care provider in 3-4 weeks  Follow up with specialist ortho as recommended   Current [place] scheduled appointments:  Appointments in Next Year     None  Discharge Services: Home Care:  Occupational Therapy, Physical Therapy, Registered Nurse, Home Health Aide, and From:  [facility]  Discharge Instructions Verbalized to Patient at Discharge:   Weight bearing restrictions:  Weight bearing as tolerated.   DO NOT DRIVE while taking narcotic pain medications.     TOTAL DISCHARGE TIME:   Greater than 30 minutes  Electronically signed by:  NIMCO Haque CNP     Documentation of Face to Face and Certification for Home Health Services    I certify that services are/were furnished while this patient was under the care of a physician and that a physician or an allowed non-physician practitioner (NPP), had a face-to-face encounter that meets the physician face-to-face encounter requirements. The encounter was in whole, or in part, related to the primary reason for home health. The patient is confined to his/her home and needs intermittent skilled nursing, physical therapy, speech-language pathology, or the continued need for occupational therapy. A plan of care has been established by a physician and is periodically reviewed by a physician.  Date of  Face-to-Face Encounter: 6/17/2025.    I certify that, based on my findings, the following services are medically necessary home health services: Nursing, Occupational Therapy, Physical Therapy, and HHA.    My clinical findings support the need for the above skilled services because: Requires assistance of another person or specialized equipment to access medical services because patient:  pain, weakness..    Patient to re-establish plan of care with their PCP within 7-10 days after leaving the facility to reestablish care.  Medicare certified PECOS provider: NIMCO Haque CNP  Date: June 17, 2025

## 2025-06-21 ENCOUNTER — TELEPHONE (OUTPATIENT)
Dept: NURSING | Facility: CLINIC | Age: 65
End: 2025-06-21
Payer: COMMERCIAL

## 2025-06-21 NOTE — TELEPHONE ENCOUNTER
Home Care is calling regarding an established patient with [facility].  Requesting orders from: Antonio Mccauley  FYI: RN able to provide verbal orders.  Sending as FYI only.  Is this a request for a temporary pause in the home care episode?  No    Orders Requested    Skilled Nursing  Request for initial certification (first set of orders)   Frequency: Pt needs skilled care 2 per week for one week  RN gave verbal order: Yes      Physical Therapy  Request for initial certification (first set of orders)   Frequency: Evaluate and treat   RN gave verbal order: Yes      Occupational Therapy  Request for initial evaluation and treatment (one time)   RN gave verbal order: No: Evaluate and Treat         Phone number Home Care can be reached at: [phone] - able to leave message - confidential voice mail   Okay to leave a detailed message?: Yes      FYI - routed to provider and Home Care will fax over order copy       Marika More RN  [place] Nurse Advisor  11:44 AM 6/21/2025

## 2025-06-23 ENCOUNTER — PATIENT OUTREACH (OUTPATIENT)
Dept: CARE COORDINATION | Facility: CLINIC | Age: 65
End: 2025-06-23
Payer: COMMERCIAL

## 2025-06-23 DIAGNOSIS — Z09 HOSPITAL DISCHARGE FOLLOW-UP: ICD-10-CM

## 2025-06-23 NOTE — PROGRESS NOTES
Clinic Care Coordination Contact  [facility]/Voicemail    Clinical Data: Care Coordinator Outreach    Outreach Documentation Number of Outreach Attempt   6/23/2025   9:46 AM 1       Left message on patient's voicemail with call back information and requested return call.      Plan: Care Coordinator will try to reach patient again in 1-2 business days.    Kacie Linares RN Clinic Care Coordinator  [facility] Clin[facility]: [place], [place] (on-site Wednesdays), [facility] (on-site Thursdays) & [facility].  [email or web address]  Phone: [phone]

## 2025-06-23 NOTE — LETTER
[facility] CARE COORDINATION  [address]  [place] [address]    June 25, 2025    Vandana Gonzalez  [address]  [place] [address]      Dear Vandana,    I am a clinic care coordinator who works with Antonio Mccauley MD with the [facility]. I recently tried to call and was unable to reach you. I was checking in to see how you are doing now that you are home. Below is a description of clinic care coordination and how we assist patients.      The clinic care coordination team is made up of a registered nurse, , financial resource worker and community health worker who understand the health care system. The goal of clinic care coordination is to help you manage your health and improve access to the health care system. Our team works alongside your provider to assist you in determining your health and social needs. We can help you obtain health care and community resources, providing you with necessary information and education. We can work with you through any barriers and develop a care plan that helps coordinate and strengthen the communication between you and your care team.  Our services are voluntary and are offered without charge to you personally.    Please feel free to contact me with any questions or concerns regarding care coordination and what we can offer.      We are focused on providing you with the highest-quality healthcare experience possible.    Sincerely,     Kacie Linares, RN Clinic Care Coordinator  [facility]: [place], [place] (on-site Wednesdays), [facility] (on-site Thursdays) & [facility].  [email or web address]  Phone: [phone]

## 2025-06-24 ENCOUNTER — TELEPHONE (OUTPATIENT)
Dept: FAMILY MEDICINE | Facility: CLINIC | Age: 65
End: 2025-06-24
Payer: COMMERCIAL

## 2025-06-24 ENCOUNTER — TELEPHONE (OUTPATIENT)
Dept: PHARMACY | Facility: OTHER | Age: 65
End: 2025-06-24
Payer: COMMERCIAL

## 2025-06-24 NOTE — TELEPHONE ENCOUNTER
MTM referral from: Transitions of Care (recent hospital discharge, TCU discharge, or ED visit)    MTM referral outreach attempt #2 on June 24, 2025 at 9:20 AM      Outcome: Left Message    Use ez, USE TCU (tcu d/c 06/20/25) for the carrier/Plan on the flowsheet      viVood Message Sent    Lola Howell [facility]  -[facility]  [phone]

## 2025-06-24 NOTE — TELEPHONE ENCOUNTER
FYI - Status Update    Who is Calling: Physcial Therapist    Update: requesting verbal orders for home care physical therapy at a freq of 2x/week for 3 weeks. Followed by 1x/week for 3 weeks. Will be working on gate training, transfers and balance.    Nathalie opted to leave a msg this time as he had previously called 3 times and wasn't able to get through.    Does caller want a call/response back: Yes     Could we send this information to you in [email or web address] or would you prefer to receive a phone call?:   Patient would prefer a phone call     Okay to leave a detailed message?: Yes at Other phone number:  Nathalie @ his personal and confidential # [phone].

## 2025-06-25 NOTE — PROGRESS NOTES
Clinic Care Coordination Contact  [facility]/Voicemail    Clinical Data: Care Coordinator Outreach    Outreach Documentation Number of Outreach Attempt   6/23/2025   9:46 AM 1   6/25/2025   8:34 AM 2       Left message on patient's voicemail with call back information and requested return call.      Plan: Care Coordinator will send unable to contact letter with care coordinator contact information via [employer]. Care Coordinator will do no further outreaches at this time.    Kacie Linares, RN Clinic Care Coordinator  [facility] Clinics: [place], [place] (on-site Wednesdays), [facility] (on-site Thursdays) & [facility].  [email or web address]  Phone: [phone]

## (undated) DEVICE — ESU GROUND PAD UNIVERSAL W/O CORD

## (undated) DEVICE — DRAPE C-ARMOR 5 SIDED 5523

## (undated) DEVICE — DRSG TEGADERM 6X8" 1628

## (undated) DEVICE — DRAPE IOBAN ISOLATION VERTICAL 320X21CM 6617

## (undated) DEVICE — SU ETHIBOND 2 V-37 4X30" MX69G

## (undated) DEVICE — SU MONOCRYL 3-0 PS-2 27" Y427H

## (undated) DEVICE — SOL NACL 0.9% INJ 1000ML BAG 2B1324X

## (undated) DEVICE — SUCTION MANIFOLD NEPTUNE 2 SYS 4 PORT 0702-020-000

## (undated) DEVICE — GLOVE PROTEXIS W/NEU-THERA 8.5  2D73TE85

## (undated) DEVICE — SU VICRYL 1 CT 36" J959H

## (undated) DEVICE — KIT PATIENT CARE HANA TABLE PROFX SUPINE 6855

## (undated) DEVICE — GLOVE PROTEXIS W/NEU-THERA 7.0  2D73TE70

## (undated) DEVICE — BAG DECANTER STERILE WHITE DYNJDEC09

## (undated) DEVICE — DRAPE IOBAN INCISE 23X17" 6650EZ

## (undated) DEVICE — GLOVE PROTEXIS POWDER FREE 8.0 ORTHOPEDIC 2D73ET80

## (undated) DEVICE — Device

## (undated) DEVICE — BLADE SAW SAGITTAL STRK 18X90X1.37MM HD SYS 6 6118-137-090

## (undated) DEVICE — DRSG XEROFORM 5X9" 8884431605

## (undated) DEVICE — DRSG GAUZE 4X4" 3033

## (undated) DEVICE — SOL WATER IRRIG 1000ML BOTTLE 2F7114

## (undated) DEVICE — BONE CLEANING TIP INTERPULSE  0210-010-000

## (undated) DEVICE — SOLUTION WOUND CLEANSING 3/4OZ 10% PVP EA-L3011FB-50

## (undated) DEVICE — SOL NACL 0.9% INJ 250ML BAG 2B1322Q

## (undated) DEVICE — DRILL BIT BIOM QUICK CONNECTING RING LOCK 3.2X20MM 31-323220

## (undated) DEVICE — GLOVE PROTEXIS BLUE W/NEU-THERA 6.5  2D73EB65

## (undated) DEVICE — SUCTION IRR SYSTEM W/O TIP INTERPULSE HANDPIECE 0210-100-000

## (undated) DEVICE — LINEN TOWEL PACK X5 5464

## (undated) DEVICE — DRAPE SHEET REV FOLD 3/4 9349

## (undated) DEVICE — DRSG TEGADERM 4X4 3/4" 1626

## (undated) DEVICE — PACK UNIVERSAL SPLIT 29131

## (undated) DEVICE — DRAPE CONVERTORS U-DRAPE 60X72" 8476

## (undated) DEVICE — SU VICRYL 2-0 CT-2 27" UND J269H

## (undated) DEVICE — SU VICRYL 0 CP-1 27" J467H

## (undated) DEVICE — SYR 50ML LL W/O NDL 309653

## (undated) DEVICE — GLOVE PROTEXIS POWDER FREE 6.5 ORTHOPEDIC 2D73ET65

## (undated) DEVICE — ESU BIPOLAR SEALER AQUAMANTYS 6MM 23-112-1

## (undated) DEVICE — BIT DRL 266MM 16MM CNN LG QC TFN-ADV PROX FEM NL 03.037.003

## (undated) DEVICE — SUCTION TIP YANKAUER STR K87

## (undated) DEVICE — MANIFOLD NEPTUNE 4 PORT 700-20

## (undated) DEVICE — SU VICRYL 2-0 CP-1 27" UND J266H

## (undated) DEVICE — GLOVE PROTEXIS BLUE W/NEU-THERA 7.0  2D73EB70

## (undated) DEVICE — DRILL BIT QUICK COUPLING 3 FLUTE 4.2MMX145MM NDL  POINT

## (undated) DEVICE — STPL SKIN 35W 6.9MM  PXW35

## (undated) DEVICE — SOL NACL 0.9% IRRIG 1000ML BOTTLE 2F7124

## (undated) DEVICE — DRSG XEROFORM 1X8"

## (undated) DEVICE — SURGICEL HEMOSTAT 2X14" 1951

## (undated) DEVICE — NDL 18GA 1.5" 305196

## (undated) DEVICE — GLOVE PROTEXIS POWDER FREE 8.5 ORTHOPEDIC 2D73ET85

## (undated) DEVICE — PREP CHLORAPREP 26ML TINTED HI-LITE ORANGE 930815

## (undated) DEVICE — SPONGE RAY-TEC 4X8" 7318

## (undated) DEVICE — DRILL BIL CANNULATED 6MM/9MM 03.037.022

## (undated) DEVICE — DRAPE C-ARM 60X42" 1013

## (undated) DEVICE — GLOVE BIOGEL PI MICRO INDICATOR UNDERGLOVE SZ 8.0 48980

## (undated) DEVICE — PREP CHLORAPREP 26ML TINTED ORANGE  260815

## (undated) DEVICE — WIRE GUIDE 3.2X400MM  357.399

## (undated) DEVICE — PACK TOTAL HIP W/U DRAPE SOP15HUFSC

## (undated) DEVICE — ROD SYN REAMER BALL TIP 2.5X950MM  351.706S

## (undated) RX ORDER — ALBUMIN, HUMAN INJ 5% 5 %
SOLUTION INTRAVENOUS
Status: DISPENSED
Start: 2021-10-28

## (undated) RX ORDER — ONDANSETRON 2 MG/ML
INJECTION INTRAMUSCULAR; INTRAVENOUS
Status: DISPENSED
Start: 2025-05-14

## (undated) RX ORDER — EPINEPHRINE 1 MG/ML
INJECTION, SOLUTION, CONCENTRATE INTRAVENOUS
Status: DISPENSED
Start: 2018-10-22

## (undated) RX ORDER — HYDROMORPHONE HYDROCHLORIDE 1 MG/ML
INJECTION, SOLUTION INTRAMUSCULAR; INTRAVENOUS; SUBCUTANEOUS
Status: DISPENSED
Start: 2025-05-14

## (undated) RX ORDER — HYDROMORPHONE HYDROCHLORIDE 1 MG/ML
INJECTION, SOLUTION INTRAMUSCULAR; INTRAVENOUS; SUBCUTANEOUS
Status: DISPENSED
Start: 2021-10-28

## (undated) RX ORDER — NEOSTIGMINE METHYLSULFATE 1 MG/ML
VIAL (ML) INJECTION
Status: DISPENSED
Start: 2021-10-28

## (undated) RX ORDER — FENTANYL CITRATE 50 UG/ML
INJECTION, SOLUTION INTRAMUSCULAR; INTRAVENOUS
Status: DISPENSED
Start: 2021-10-28

## (undated) RX ORDER — DEXAMETHASONE SODIUM PHOSPHATE 4 MG/ML
INJECTION, SOLUTION INTRA-ARTICULAR; INTRALESIONAL; INTRAMUSCULAR; INTRAVENOUS; SOFT TISSUE
Status: DISPENSED
Start: 2025-05-14

## (undated) RX ORDER — EPINEPHRINE 1 MG/ML
INJECTION, SOLUTION INTRAMUSCULAR; SUBCUTANEOUS
Status: DISPENSED
Start: 2021-10-28

## (undated) RX ORDER — GLYCOPYRROLATE 0.2 MG/ML
INJECTION, SOLUTION INTRAMUSCULAR; INTRAVENOUS
Status: DISPENSED
Start: 2021-10-28

## (undated) RX ORDER — TRANEXAMIC ACID 10 MG/ML
INJECTION, SOLUTION INTRAVENOUS
Status: DISPENSED
Start: 2021-10-28

## (undated) RX ORDER — TRANEXAMIC ACID 10 MG/ML
INJECTION, SOLUTION INTRAVENOUS
Status: DISPENSED
Start: 2025-05-14

## (undated) RX ORDER — FENTANYL CITRATE 50 UG/ML
INJECTION, SOLUTION INTRAMUSCULAR; INTRAVENOUS
Status: DISPENSED
Start: 2021-10-07

## (undated) RX ORDER — LIDOCAINE HYDROCHLORIDE 20 MG/ML
INJECTION, SOLUTION EPIDURAL; INFILTRATION; INTRACAUDAL; PERINEURAL
Status: DISPENSED
Start: 2021-10-28

## (undated) RX ORDER — VANCOMYCIN HYDROCHLORIDE 1 G/20ML
INJECTION, POWDER, LYOPHILIZED, FOR SOLUTION INTRAVENOUS
Status: DISPENSED
Start: 2021-10-28

## (undated) RX ORDER — CEFAZOLIN SODIUM 2 G/100ML
INJECTION, SOLUTION INTRAVENOUS
Status: DISPENSED
Start: 2021-10-28

## (undated) RX ORDER — PROPOFOL 10 MG/ML
INJECTION, EMULSION INTRAVENOUS
Status: DISPENSED
Start: 2021-10-28

## (undated) RX ORDER — LIDOCAINE HYDROCHLORIDE 10 MG/ML
INJECTION, SOLUTION EPIDURAL; INFILTRATION; INTRACAUDAL; PERINEURAL
Status: DISPENSED
Start: 2018-10-22

## (undated) RX ORDER — BUPIVACAINE HYDROCHLORIDE AND EPINEPHRINE 5; 5 MG/ML; UG/ML
INJECTION, SOLUTION EPIDURAL; INTRACAUDAL; PERINEURAL
Status: DISPENSED
Start: 2025-05-14

## (undated) RX ORDER — ONDANSETRON 2 MG/ML
INJECTION INTRAMUSCULAR; INTRAVENOUS
Status: DISPENSED
Start: 2021-10-28

## (undated) RX ORDER — PROPOFOL 10 MG/ML
INJECTION, EMULSION INTRAVENOUS
Status: DISPENSED
Start: 2025-05-14

## (undated) RX ORDER — VECURONIUM BROMIDE 1 MG/ML
INJECTION, POWDER, LYOPHILIZED, FOR SOLUTION INTRAVENOUS
Status: DISPENSED
Start: 2025-05-14

## (undated) RX ORDER — DEXAMETHASONE SODIUM PHOSPHATE 4 MG/ML
INJECTION, SOLUTION INTRA-ARTICULAR; INTRALESIONAL; INTRAMUSCULAR; INTRAVENOUS; SOFT TISSUE
Status: DISPENSED
Start: 2021-10-28

## (undated) RX ORDER — FENTANYL CITRATE 50 UG/ML
INJECTION, SOLUTION INTRAMUSCULAR; INTRAVENOUS
Status: DISPENSED
Start: 2025-05-14

## (undated) RX ORDER — CEFAZOLIN SODIUM/WATER 2 G/20 ML
SYRINGE (ML) INTRAVENOUS
Status: DISPENSED
Start: 2025-05-14